# Patient Record
Sex: FEMALE | Race: BLACK OR AFRICAN AMERICAN | NOT HISPANIC OR LATINO | Employment: OTHER | URBAN - METROPOLITAN AREA
[De-identification: names, ages, dates, MRNs, and addresses within clinical notes are randomized per-mention and may not be internally consistent; named-entity substitution may affect disease eponyms.]

---

## 2017-03-20 ENCOUNTER — HOSPITAL ENCOUNTER (OUTPATIENT)
Dept: PULMONOLOGY | Facility: CLINIC | Age: 67
Discharge: HOME | End: 2017-03-20
Payer: MEDICARE

## 2017-03-20 ENCOUNTER — OFFICE VISIT (OUTPATIENT)
Dept: TRANSPLANT | Facility: CLINIC | Age: 67
End: 2017-03-20
Payer: MEDICARE

## 2017-03-20 VITALS
WEIGHT: 202.38 LBS | OXYGEN SATURATION: 97 % | SYSTOLIC BLOOD PRESSURE: 116 MMHG | DIASTOLIC BLOOD PRESSURE: 68 MMHG | HEART RATE: 81 BPM | HEIGHT: 66 IN | BODY MASS INDEX: 32.53 KG/M2

## 2017-03-20 VITALS — HEIGHT: 66 IN | BODY MASS INDEX: 32.14 KG/M2 | WEIGHT: 200 LBS

## 2017-03-20 DIAGNOSIS — E66.9 OBESITY, UNSPECIFIED OBESITY SEVERITY, UNSPECIFIED OBESITY TYPE: ICD-10-CM

## 2017-03-20 DIAGNOSIS — I27.20 PULMONARY HTN: ICD-10-CM

## 2017-03-20 DIAGNOSIS — I50.810 RVF (RIGHT VENTRICULAR FAILURE): ICD-10-CM

## 2017-03-20 PROCEDURE — 3074F SYST BP LT 130 MM HG: CPT | Mod: S$GLB,,, | Performed by: INTERNAL MEDICINE

## 2017-03-20 PROCEDURE — 99214 OFFICE O/P EST MOD 30 MIN: CPT | Mod: S$GLB,,, | Performed by: INTERNAL MEDICINE

## 2017-03-20 PROCEDURE — 99999 PR PBB SHADOW E&M-EST. PATIENT-LVL III: CPT | Mod: PBBFAC,,, | Performed by: INTERNAL MEDICINE

## 2017-03-20 PROCEDURE — 3078F DIAST BP <80 MM HG: CPT | Mod: S$GLB,,, | Performed by: INTERNAL MEDICINE

## 2017-03-20 PROCEDURE — 94620 PR PULMONARY STRESS TESTING,SIMPLE: CPT | Mod: S$GLB,,, | Performed by: INTERNAL MEDICINE

## 2017-03-20 PROCEDURE — 1160F RVW MEDS BY RX/DR IN RCRD: CPT | Mod: S$GLB,,, | Performed by: INTERNAL MEDICINE

## 2017-03-20 NOTE — PROCEDURES
Bessie Davis is a 66 y.o.  female patient, who presents for a 6 minute walk test ordered by mD. Isis.  The diagnosis is Pulmonary Hypertension.  The patient's BMI is 32.3 kg/m2.  Predicted distance (lower limit of normal) is 297.5 meters.      Test Results:    The test was completed with stops.  The patient stopped 1 times for a total of 17 seconds.  The total time walked was 343 seconds.  During walking, the patient reported:  Dyspnea, Leg pain, Other (Comment) (Back Pain). The patient used no assistive devices during testing.     03/20/2017---------Distance: 335.28 meters (1100 feet)     O2 Sat % Supplemental Oxygen Heart Rate Blood Pressure Matilda Scale   Pre-exercise  (Resting) 97 % Room Air 77 bpm 119/65 mmHg 0   During Exercise 88 % Room Air 95 bpm 141/82 mmHg 3   Post-exercise  (Recovery) 96 % Room Air  87 bpm 135/72 mmHg       Recovery Time: 160 seconds    Performing nurse/tech: ANGELINA Putnam.      PREVIOUS STUDY:   The patient had a previous study.  11/28/2016---------Distance: 321.87 meters (1056 feet)       O2 Sat % Supplemental Oxygen Heart Rate Blood Pressure Matilda Scale   Pre-exercise  (Resting) 95 % Room Air 80 bpm 121/59 mmHg 1   During Exercise 84 % Room Air 104 bpm 145/71 mmHg 4   Post-exercise  (Recovery) 93 % Room Air  90 bpm   mmHg          CLINICAL INTERPRETATION:  Six minute walk distance is 335.28 meters (1100 feet) with moderate dyspnea.  During exercise, there was significant desaturation while breathing room air.  Both blood pressure and heart rate remained stable with walking.  The patient reported non-pulmonary symptoms during exercise.  The patient did complete the study, walking 343 seconds of the 360 second test.  The patient may benefit from using supplemental oxygen during exertion.  Since the previous study in November 2016, exercise capacity is unchanged.  Based upon age and body mass index, exercise capacity is normal.

## 2017-03-20 NOTE — MR AVS SNAPSHOT
[facility]  [address]  [place] [address]  Phone: [phone]                  Bessie Davis   3/20/2017 9:30 AM   Office Visit    Description:  Female : 1950   Provider:  Chula Marinelli MD   Department:  [facility]           Reason for Visit     Pulmonary Hypertension           Diagnoses this Visit        Comments    Pulmonary HTN         RVF (right ventricular failure)         Obesity, unspecified obesity severity, unspecified obesity type                To Do List           Goals (5 Years of Data)     None      Follow-Up and Disposition     Return in about 3 months (around 2017).      [facility]     [facility] -  Assistance  Registered nurses in the [facility] provide clinical advisement, health education, appointment booking, and other advisory services.  Call for this free service at [phone].             Medications           Message regarding Medications     Verify the changes and/or additions to your medication regime listed below are the same as discussed with your clinician today.  If any of these changes or additions are incorrect, please notify your healthcare provider.             Verify that the below list of medications is an accurate representation of the medications you are currently taking.  If none reported, the list may be blank. If incorrect, please contact your healthcare provider. Carry this list with you in case of emergency.           Current Medications     ammonium lactate 12 % Crea Apply to feet daily after showering    aspirin (ECOTRIN) 81 MG EC tablet Take 1 tablet (81 mg total) by mouth once daily.    atorvastatin (LIPITOR) 80 MG tablet Take 1 tablet (80 mg total) by mouth every evening. DO NOT TAKE UNTIL FOLLOWING UP WITH MD.    bumetanide (BUMEX) 1 MG tablet Take 2 tablets (2 mg total) by mouth 2 (two) times daily. DO NOT TAKE UNTIL FOLLOWING UP WITH MD.    PARKER AC  " mg/5 mL syrup TAKE 5-10ML BY MOUTH EVERY 6 HOURS AS NEEDED FOR COUGH    colchicine 0.6 mg tablet 0.6 mg once daily.     COMBIVENT RESPIMAT  mcg/actuation inhaler     ferrous gluconate (FERGON) 324 MG tablet Take 1 tablet (324 mg total) by mouth 3 (three) times daily with meals.    guaifenesin (MUCINEX) 600 mg 12 hr tablet Take 1,200 mg by mouth 2 (two) times daily as needed for Congestion.    hydrocodone-acetaminophen 5-300 mg Tab Take 1 tablet by mouth 2 (two) times daily as needed (for pain).     irbesartan (AVAPRO) 150 MG tablet Take 150 mg by mouth once daily.    magnesium oxide (MAG-OX) 400 mg tablet Take 1 tablet (400 mg total) by mouth once daily.    potassium chloride SA (K-DUR,KLOR-CON) 20 MEQ tablet Take 1 tablet (20 mEq total) by mouth once daily. DO NOT RESTART UNTIL YOU RESTART YOUR BUMEX.    ranitidine (ZANTAC) 150 MG tablet Take 150 mg by mouth once daily.    treprostinil (TYVASO STARTER KIT) 1.74 mg/2.9 mL Nebu Begin with inhaling 3 breaths, 4 times daily. Increase inhalations by 3, 4 times daily weekly, until goal of 9 inhalations, 4 times daily is reached.    treprostinil-neb accessories (TYVASO REFILL KIT) 1.74 mg/2.9 mL (0.6 mg/mL) Nebu Begin with inhaling 3 breaths, 4 times daily. Increase inhalations by 3, 4 times daily weekly, until goal of 9 inhalations, 4 times daily is reached.    albuterol 90 mcg/actuation inhaler Inhale 2 puffs into the lungs every 6 (six) hours as needed for Wheezing.    budesonide-formoterol 160-4.5 mcg (SYMBICORT) 160-4.5 mcg/actuation HFAA Inhale 2 puffs into the lungs every 12 (twelve) hours.    cetirizine (ZYRTEC) 5 MG tablet Take 1 tablet (5 mg total) by mouth once daily.           Clinical Reference Information           Your Vitals Were     BP Pulse Height Weight SpO2 BMI    116/68 81 5' 6" (1.676 m) 91.8 kg (202 lb 6.1 oz) 97% 32.67 kg/m2      Blood Pressure          Most Recent Value    BP  116/68      Allergies as of 3/20/2017     Penicillins    " Sulfa (Sulfonamide Antibiotics)      Immunizations Administered on Date of Encounter - 3/20/2017     None      Orders Placed During Today's Visit     Future Labs/Procedures Expected by Expires    Comprehensive metabolic panel  As directed 3/20/2018    Recurring Lab Work Interval Expires    Brain natriuretic peptide  Every 4 Weeks until 3/20/2018 3/20/2018    Stress test, pulmonary  Every 12 Weeks until 2/18/2019 2/18/2019      [facility] Sign-Up     Activating your [facility] account is as easy as 1-2-3!     1) Visit [email or web address], select Sign Up Now, enter this activation code and your date of birth, then select Next.  [email or web address]  Expires: 5/4/2017 10:17 AM      2) Create a username and password to use when you visit [facility] in the future and select a security question in case you lose your password and select Next.    3) Enter your e-mail address and click Sign Up!    Additional Information  If you have questions, please e-mail [email or web address] or call [phone] to talk to our [facility] staff. Remember, [facility] is NOT to be used for urgent needs. For medical emergencies, dial 911.         Language Assistance Services     ATTENTION: Language assistance services are available, free of charge. Please call [phone].      ATENCIÓN: Si habla español, tiene a gallegos disposición servicios gratuitos de asistencia lingüística. Llame al [phone].     CHÚ Ý: N?u b?n nói Ti?ng Vi?t, có các d?ch v? h? tr? ngôn ng? mi?n phí dành cho b?n. G?i s? [phone].         [facility] complies with applicable Federal civil rights laws and does not discriminate on the basis of race, color, national origin, age, disability, or sex.

## 2017-03-20 NOTE — PROGRESS NOTES
"Subjective:     HPI:  65 y.o. AAF with history of obstructive lung disease, severe pulmonary hypertension (group 1), moderate to severe RV dysfunction, chronic hypercapnic respiratory failure, , obesity, active tobacco dependence, GLENN on CPAP, and diabetes who comes today for a regular F/U. She recently underwent a RHC that showed severe PH with normal PCWP. After which she was started on Tyvaso. So far tolerating her regimen. Since then she has been doing well. Had no HF admissions. Reporting WHO class II symptoms. No PND or orthopnea. She comes today with her grand children. Labs reviewed today and all WNL.      RHC done 9/2016  AOPRES: 131/84 (100)  AOSAT: 97  FICKCI: 2.46  FICKCO: 4.99  PAPRES: 109/29 (61)  PASAT: 69  PVR: 10.01  PWPRES: 13/14 (14)  RAPRES: 12/11 (10)  RVPRES: 94/6, 14    Past Medical History:   Diagnosis Date    Asthma     Diastolic dysfunction with heart failure 5/20/2015    Occl RCA, high grade prox LAD and LCfx cath [facility] Dec 2014 5/11/2015    Pulmonary HTN     Sleep apnea      Past Surgical History:   Procedure Laterality Date    BACK SURGERY      HYSTERECTOMY       OB History     No data available        Review of Systems   Constitution: Negative. Negative for chills, decreased appetite, diaphoresis, fever, weakness, malaise/fatigue, night sweats, weight gain and weight loss.   Eyes: Negative.    Cardiovascular: Positive for dyspnea on exertion. Negative for chest pain, claudication, cyanosis, irregular heartbeat, leg swelling, near-syncope, orthopnea, palpitations, paroxysmal nocturnal dyspnea and syncope.   Respiratory: Negative for cough, hemoptysis and shortness of breath.    Endocrine: Negative.    Hematologic/Lymphatic: Negative.    Skin: Negative for color change, dry skin and nail changes.   Musculoskeletal: Negative.    Gastrointestinal: Negative.    Genitourinary: Negative.        Objective:   Blood pressure 116/68, pulse 81, height 5' 6" (1.676 m), weight 91.8 kg (202 lb " 6.1 oz), SpO2 97 %.body mass index is 32.67 kg/(m^2).  Physical Exam   Constitutional: She is oriented to person, place, and time. Vital signs are normal. She appears well-developed and well-nourished.   HENT:   Head: Normocephalic.   Eyes: Pupils are equal, round, and reactive to light.   Neck: Neck supple. No JVD present.   Cardiovascular: Normal rate, regular rhythm, normal heart sounds and intact distal pulses.  PMI is displaced.  Exam reveals no gallop and no friction rub.    No murmur heard.  Pulmonary/Chest: Effort normal and breath sounds normal. No respiratory distress. She has no wheezes. She has no rales.   Abdominal: Soft. Bowel sounds are normal. She exhibits no distension. There is no tenderness. There is no rebound.   Musculoskeletal: She exhibits no edema.   Neurological: She is alert and oriented to person, place, and time.   Nursing note and vitals reviewed.      Labs:    Chemistry        Component Value Date/Time     03/20/2017 0855    K 4.6 03/20/2017 0855     03/20/2017 0855    CO2 27 03/20/2017 0855    BUN 16 03/20/2017 0855    CREATININE 1.0 03/20/2017 0855     03/20/2017 0855        Component Value Date/Time    CALCIUM 9.6 03/20/2017 0855    ALKPHOS 119 03/20/2017 0855    AST 17 03/20/2017 0855    ALT 16 03/20/2017 0855    BILITOT 0.7 03/20/2017 0855          Magnesium   Date Value Ref Range Status   11/28/2016 2.3 1.6 - 2.6 mg/dL Final     Lab Results   Component Value Date    WBC 4.38 11/28/2016    HGB 15.0 11/28/2016    HCT 48.0 11/28/2016     11/28/2016     Lab Results   Component Value Date    INR 1.0 09/07/2016    INR 1.1 05/11/2015    INR 1.1 05/10/2015     BNP   Date Value Ref Range Status   03/20/2017 <10 0 - 99 pg/mL Final     Comment:     Values of less than 100 pg/ml are consistent with non-CHF populations.   11/28/2016 37 0 - 99 pg/mL Final     Comment:     Values of less than 100 pg/ml are consistent with non-CHF populations.   08/17/2016 <10 0 - 99  pg/mL Final     Comment:     Values of less than 100 pg/ml are consistent with non-CHF populations.     LD   Date Value Ref Range Status   05/04/2015 380 (H) 110 - 260 U/L Final     Comment:     Results are increased in hemolyzed samples.   05/01/2015 301 (H) 110 - 260 U/L Final     Comment:     Results are increased in hemolyzed samples.     No results found for this or any previous visit.  No results found for this or any previous visit.    Assessment:      1. Pulmonary HTN    2. RVF (right ventricular failure)    3. Obesity, unspecified obesity severity, unspecified obesity type        Plan:   Clinically reports doing well. Euvolemic. WHO class II symptoms since on Tyvaso. Continue current PH regimen for now  Recommend 2 gram sodium restriction and 1500cc fluid restriction.  Encourage physical activity with graded exercise program.  Requested patient to weigh themselves daily, and to notify us if their weight increases by more than 3 lbs in 1 day or 5 lbs in 1 week.    RTC in 3 months with labs and 6 MWT      Chula Marinelli MD

## 2017-07-20 ENCOUNTER — LAB VISIT (OUTPATIENT)
Dept: LAB | Facility: HOSPITAL | Age: 67
End: 2017-07-20
Attending: INTERNAL MEDICINE
Payer: MEDICARE

## 2017-07-20 ENCOUNTER — OFFICE VISIT (OUTPATIENT)
Dept: TRANSPLANT | Facility: CLINIC | Age: 67
End: 2017-07-20
Payer: MEDICARE

## 2017-07-20 VITALS
WEIGHT: 210.75 LBS | DIASTOLIC BLOOD PRESSURE: 69 MMHG | SYSTOLIC BLOOD PRESSURE: 122 MMHG | HEART RATE: 79 BPM | HEIGHT: 66 IN | BODY MASS INDEX: 33.87 KG/M2

## 2017-07-20 DIAGNOSIS — I27.20 PULMONARY HYPERTENSION: Primary | ICD-10-CM

## 2017-07-20 DIAGNOSIS — E66.9 OBESITY, UNSPECIFIED OBESITY SEVERITY, UNSPECIFIED OBESITY TYPE: ICD-10-CM

## 2017-07-20 DIAGNOSIS — I50.810 RVF (RIGHT VENTRICULAR FAILURE): ICD-10-CM

## 2017-07-20 DIAGNOSIS — I50.32 CHRONIC DIASTOLIC CONGESTIVE HEART FAILURE: ICD-10-CM

## 2017-07-20 DIAGNOSIS — I27.9 CHRONIC PULMONARY HEART DISEASE: ICD-10-CM

## 2017-07-20 DIAGNOSIS — I27.20 PULMONARY HTN: ICD-10-CM

## 2017-07-20 DIAGNOSIS — I50.32 CHRONIC DIASTOLIC HEART FAILURE: ICD-10-CM

## 2017-07-20 DIAGNOSIS — I25.10 CORONARY ARTERY DISEASE INVOLVING NATIVE CORONARY ARTERY OF NATIVE HEART WITHOUT ANGINA PECTORIS: ICD-10-CM

## 2017-07-20 LAB
ALBUMIN SERPL BCP-MCNC: 3.5 G/DL
ALP SERPL-CCNC: 116 U/L
ALT SERPL W/O P-5'-P-CCNC: 23 U/L
ANION GAP SERPL CALC-SCNC: 8 MMOL/L
AST SERPL-CCNC: 19 U/L
BILIRUB SERPL-MCNC: 0.5 MG/DL
BNP SERPL-MCNC: 17 PG/ML
BNP SERPL-MCNC: 17 PG/ML
BUN SERPL-MCNC: 22 MG/DL
CALCIUM SERPL-MCNC: 9.3 MG/DL
CHLORIDE SERPL-SCNC: 105 MMOL/L
CO2 SERPL-SCNC: 28 MMOL/L
CREAT SERPL-MCNC: 1 MG/DL
EST. GFR  (AFRICAN AMERICAN): >60 ML/MIN/1.73 M^2
EST. GFR  (NON AFRICAN AMERICAN): 58.8 ML/MIN/1.73 M^2
GLUCOSE SERPL-MCNC: 101 MG/DL
POTASSIUM SERPL-SCNC: 4 MMOL/L
PROT SERPL-MCNC: 7.3 G/DL
SODIUM SERPL-SCNC: 141 MMOL/L

## 2017-07-20 PROCEDURE — 99999 PR PBB SHADOW E&M-EST. PATIENT-LVL V: CPT | Mod: PBBFAC,,, | Performed by: INTERNAL MEDICINE

## 2017-07-20 PROCEDURE — 1157F ADVNC CARE PLAN IN RCRD: CPT | Mod: S$GLB,,, | Performed by: INTERNAL MEDICINE

## 2017-07-20 PROCEDURE — 1126F AMNT PAIN NOTED NONE PRSNT: CPT | Mod: S$GLB,,, | Performed by: INTERNAL MEDICINE

## 2017-07-20 PROCEDURE — 99215 OFFICE O/P EST HI 40 MIN: CPT | Mod: S$GLB,,, | Performed by: INTERNAL MEDICINE

## 2017-07-20 PROCEDURE — 1159F MED LIST DOCD IN RCRD: CPT | Mod: S$GLB,,, | Performed by: INTERNAL MEDICINE

## 2017-07-20 RX ORDER — ALLOPURINOL 100 MG/1
100 TABLET ORAL DAILY
COMMUNITY
Start: 2017-07-06

## 2017-07-20 RX ORDER — FLUTICASONE PROPIONATE 50 MCG
SPRAY, SUSPENSION (ML) NASAL
COMMUNITY
Start: 2017-05-04

## 2017-07-20 RX ORDER — COLCHICINE 0.6 MG/1
0.6 TABLET ORAL
COMMUNITY

## 2017-07-20 RX ORDER — MUPIROCIN 20 MG/G
OINTMENT TOPICAL
COMMUNITY
Start: 2017-05-23 | End: 2019-12-18

## 2017-07-20 RX ORDER — TRIAMCINOLONE ACETONIDE 1 MG/G
CREAM TOPICAL
COMMUNITY
Start: 2017-05-23 | End: 2021-01-01

## 2017-07-20 NOTE — PROGRESS NOTES
Subjective:     HPI:    65 y.o. AAF with history of obstructive lung disease, severe pulmonary hypertension (group 1), moderate to severe RV dysfunction, chronic hypercapnic respiratory failure, , obesity, active tobacco dependence, GLENN on CPAP, and diabetes who comes today for a regular F/U. She  underwent a RHC that showed severe PH with normal PCWP. After which she was started on Tyvaso. So far tolerating her regimen. Since then she has been doing well. Had no HF admissions. Reporting WHO class II symptoms. No PND or orthopnea.        RHC done 9/2016  AOPRES: 131/84 (100)  AOSAT: 97  FICKCI: 2.46  FICKCO: 4.99  PAPRES: 109/29 (61)  PASAT: 69  PVR: 10.01  PWPRES: 13/14 (14)  RAPRES: 12/11 (10)  RVPRES: 94/6, 14        Past Medical History:   Diagnosis Date    Asthma     Diastolic dysfunction with heart failure 5/20/2015    Occl RCA, high grade prox LAD and LCfx cath [facility] Dec 2014 5/11/2015    Pulmonary HTN     Sleep apnea      Past Surgical History:   Procedure Laterality Date    BACK SURGERY      HYSTERECTOMY       OB History     No data available        Review of Systems   Constitution: Negative. Negative for chills, decreased appetite, diaphoresis, fever, weakness, malaise/fatigue, night sweats, weight gain and weight loss.   Eyes: Negative.    Cardiovascular: Positive for dyspnea on exertion. Negative for chest pain, claudication, cyanosis, irregular heartbeat, leg swelling, near-syncope, orthopnea, palpitations, paroxysmal nocturnal dyspnea and syncope.   Respiratory: Negative for cough, hemoptysis and shortness of breath.    Endocrine: Negative.    Hematologic/Lymphatic: Negative.    Skin: Negative for color change, dry skin and nail changes.   Musculoskeletal: Negative.    Gastrointestinal: Negative.    Genitourinary: Negative.        Objective:   There were no vitals taken for this visit.body mass index is unknown because there is no height or weight on file.  Physical Exam   Constitutional: She is  oriented to person, place, and time. Vital signs are normal. She appears well-developed and well-nourished.   HENT:   Head: Normocephalic.   Eyes: Pupils are equal, round, and reactive to light.   Neck: Neck supple. No JVD present.   Cardiovascular: Normal rate, regular rhythm, normal heart sounds and intact distal pulses.  PMI is displaced.  Exam reveals no gallop and no friction rub.    No murmur heard.  Pulmonary/Chest: Effort normal and breath sounds normal. No respiratory distress. She has no wheezes. She has no rales.   Abdominal: Soft. Bowel sounds are normal. She exhibits no distension. There is no tenderness. There is no rebound.   Musculoskeletal: She exhibits no edema.   Neurological: She is alert and oriented to person, place, and time.   Nursing note and vitals reviewed.      Labs:    Chemistry        Component Value Date/Time     03/20/2017 0855    K 4.6 03/20/2017 0855     03/20/2017 0855    CO2 27 03/20/2017 0855    BUN 16 03/20/2017 0855    CREATININE 1.0 03/20/2017 0855     03/20/2017 0855        Component Value Date/Time    CALCIUM 9.6 03/20/2017 0855    ALKPHOS 119 03/20/2017 0855    AST 17 03/20/2017 0855    ALT 16 03/20/2017 0855    BILITOT 0.7 03/20/2017 0855          Magnesium   Date Value Ref Range Status   11/28/2016 2.3 1.6 - 2.6 mg/dL Final     Lab Results   Component Value Date    WBC 4.38 11/28/2016    HGB 15.0 11/28/2016    HCT 48.0 11/28/2016     11/28/2016     Lab Results   Component Value Date    INR 1.0 09/07/2016    INR 1.1 05/11/2015    INR 1.1 05/10/2015     BNP   Date Value Ref Range Status   03/20/2017 <10 0 - 99 pg/mL Final     Comment:     Values of less than 100 pg/ml are consistent with non-CHF populations.   11/28/2016 37 0 - 99 pg/mL Final     Comment:     Values of less than 100 pg/ml are consistent with non-CHF populations.   08/17/2016 <10 0 - 99 pg/mL Final     Comment:     Values of less than 100 pg/ml are consistent with non-CHF  populations.     LD   Date Value Ref Range Status   05/04/2015 380 (H) 110 - 260 U/L Final     Comment:     Results are increased in hemolyzed samples.   05/01/2015 301 (H) 110 - 260 U/L Final     Comment:     Results are increased in hemolyzed samples.     No results found for this or any previous visit.  No results found for this or any previous visit.    Assessment:      1. Pulmonary hypertension    2. Chronic pulmonary heart disease    3. RVF (right ventricular failure)    4. Coronary artery disease involving native coronary artery of native heart without angina pectoris    5. Chronic diastolic heart failure    6. Obesity, unspecified obesity severity, unspecified obesity type        Plan:   Doing well  RTC 6 months

## 2017-11-28 DIAGNOSIS — I27.9 CHRONIC PULMONARY HEART DISEASE: Primary | ICD-10-CM

## 2018-02-09 ENCOUNTER — HOSPITAL ENCOUNTER (OUTPATIENT)
Dept: PULMONOLOGY | Facility: CLINIC | Age: 68
Discharge: HOME | End: 2018-02-09
Payer: MEDICARE

## 2018-02-09 ENCOUNTER — HOSPITAL ENCOUNTER (OUTPATIENT)
Dept: CARDIOLOGY | Facility: CLINIC | Age: 68
Discharge: HOME | End: 2018-02-09
Attending: INTERNAL MEDICINE
Payer: MEDICARE

## 2018-02-09 ENCOUNTER — TELEPHONE (OUTPATIENT)
Dept: TRANSPLANT | Facility: CLINIC | Age: 68
End: 2018-02-09

## 2018-02-09 ENCOUNTER — OFFICE VISIT (OUTPATIENT)
Dept: TRANSPLANT | Facility: CLINIC | Age: 68
End: 2018-02-09
Payer: MEDICARE

## 2018-02-09 VITALS
WEIGHT: 220 LBS | OXYGEN SATURATION: 96 % | HEART RATE: 81 BPM | SYSTOLIC BLOOD PRESSURE: 102 MMHG | HEIGHT: 66 IN | HEIGHT: 66 IN | BODY MASS INDEX: 35.36 KG/M2 | DIASTOLIC BLOOD PRESSURE: 65 MMHG | WEIGHT: 219.38 LBS | BODY MASS INDEX: 35.26 KG/M2

## 2018-02-09 DIAGNOSIS — I27.20 PULMONARY HYPERTENSION: Primary | ICD-10-CM

## 2018-02-09 DIAGNOSIS — I27.9 CHRONIC PULMONARY HEART DISEASE: ICD-10-CM

## 2018-02-09 DIAGNOSIS — I27.20 PULMONARY HTN: ICD-10-CM

## 2018-02-09 DIAGNOSIS — I50.32 CHRONIC DIASTOLIC HEART FAILURE: ICD-10-CM

## 2018-02-09 DIAGNOSIS — J96.11 CHRONIC RESPIRATORY FAILURE WITH HYPOXIA: ICD-10-CM

## 2018-02-09 DIAGNOSIS — E66.01 CLASS 2 SEVERE OBESITY DUE TO EXCESS CALORIES WITH SERIOUS COMORBIDITY AND BODY MASS INDEX (BMI) OF 35.0 TO 35.9 IN ADULT: ICD-10-CM

## 2018-02-09 DIAGNOSIS — I50.810 RVF (RIGHT VENTRICULAR FAILURE): ICD-10-CM

## 2018-02-09 DIAGNOSIS — I25.10 CORONARY ARTERY DISEASE INVOLVING NATIVE CORONARY ARTERY OF NATIVE HEART WITHOUT ANGINA PECTORIS: ICD-10-CM

## 2018-02-09 DIAGNOSIS — J43.8 OTHER EMPHYSEMA: ICD-10-CM

## 2018-02-09 DIAGNOSIS — N17.9 AKI (ACUTE KIDNEY INJURY): ICD-10-CM

## 2018-02-09 LAB
DIASTOLIC DYSFUNCTION: YES
ESTIMATED PA SYSTOLIC PRESSURE: 52.84
GLOBAL PERICARDIAL EFFUSION: ABNORMAL
RETIRED EF AND QEF - SEE NOTES: 60 (ref 55–65)
TRICUSPID VALVE REGURGITATION: ABNORMAL

## 2018-02-09 PROCEDURE — 99999 PR PBB SHADOW E&M-EST. PATIENT-LVL V: CPT | Mod: PBBFAC,,, | Performed by: INTERNAL MEDICINE

## 2018-02-09 PROCEDURE — 99214 OFFICE O/P EST MOD 30 MIN: CPT | Mod: S$GLB,,, | Performed by: INTERNAL MEDICINE

## 2018-02-09 PROCEDURE — 93306 TTE W/DOPPLER COMPLETE: CPT | Mod: S$GLB,,, | Performed by: INTERNAL MEDICINE

## 2018-02-09 PROCEDURE — 1159F MED LIST DOCD IN RCRD: CPT | Mod: S$GLB,,, | Performed by: INTERNAL MEDICINE

## 2018-02-09 PROCEDURE — 3008F BODY MASS INDEX DOCD: CPT | Mod: S$GLB,,, | Performed by: INTERNAL MEDICINE

## 2018-02-09 PROCEDURE — 94618 PULMONARY STRESS TESTING: CPT | Mod: S$GLB,,, | Performed by: INTERNAL MEDICINE

## 2018-02-09 RX ORDER — METFORMIN HYDROCHLORIDE 500 MG/1
500 TABLET ORAL 2 TIMES DAILY
COMMUNITY
Start: 2018-02-03

## 2018-02-09 NOTE — PROGRESS NOTES
Subjective:     HPI:    67 y.o. AAF with mild obstructive lung disease, severe pulmonary hypertension (WHO group 1), moderate to severe RV dysfunction, chronic hypercapnic respiratory failure, , obesity, active tobacco dependence, GLENN on CPAP, and diabetes who comes today for a regular F/U. She  underwent a RHC that showed severe PH with normal PCWP. After which she was started on Tyvaso.       Since last visit, pt says she's been feeling ok. Remains on tyvaso 9 breaths 4 times a day- only misses one if she goes to the doctor early.  Says her breathing has been pretty good but she does suffer with allergies (has to keep the AC on even if its cold out and says she has been like this all her life because she was born with asthma)  No PND or orthopnea. Sometimes retains fluid over the course of the day depending on what she drinks- today has only had a mouthful of water. No CP, says one day while doing PT for her shoulder she got LH (had not eatten and had taken her fluid pill, came home and felt drunk- hydrated and felt better)    Goes grocery shopping for herself- says she can go for hours and doesn't get OOB, but does get SOB doing housework (sweeping)    + constipation, no SE from the tyvaso    6mw today  282m ( 335 m in March 2017   )                                              O2 sat  93 -> 86 %  ->     95%    On 2L                                                HR  80 -> 104                                                                  BP   108/72   -> 133/ 80                                                        Matilda  3   -> 3    TTE today  The right ventricle is upper limit of normal measuring 4.2 cm at the base in the apical right ventricle-focused view. Global right ventricular systolic function appears low normal. Tricuspid annular plane systolic excursion (TAPSE) is   2.1 cm. Tissue Doppler-derived tricuspid annular peak systolic velocity (S prime) is 14.0 cm/s. The estimated PA systolic pressure is  "53 mmHg    1 - Normal left ventricular systolic function (EF 60-65%).     2 - Wall motion abnormalities.     3 - Biatrial enlargement.     4 - Impaired LV relaxation, elevated LAP (grade 2 diastolic dysfunction).     5 - Right ventricle is upper limit of normal in size with low normal systolic function.     6 - Trivial tricuspid regurgitation.     7 - Trivial pericardial effusion.     8 - Pulmonary hypertension. The estimated PA systolic pressure is 53 mmHg.     RHC done 9/2016  AOPRES: 131/84 (100)  AOSAT: 97  FICKCI: 2.46  FICKCO: 4.99  PAPRES: 109/29 (61)  PASAT: 69  PVR: 10.01  PWPRES: 13/14 (14)  RAPRES: 12/11 (10)  RVPRES: 94/6, 14        Past Medical History:   Diagnosis Date    Asthma     Diastolic dysfunction with heart failure 5/20/2015    Occl RCA, high grade prox LAD and LCfx cath [facility] Dec 2014 5/11/2015    Pulmonary HTN     Sleep apnea      Past Surgical History:   Procedure Laterality Date    BACK SURGERY      HYSTERECTOMY       OB History     No data available        Review of Systems   Constitution: Negative. Negative for chills, decreased appetite, diaphoresis, fever, weakness, malaise/fatigue, night sweats, weight gain and weight loss.   Eyes: Negative.    Cardiovascular: Positive for dyspnea on exertion. Negative for chest pain, claudication, cyanosis, irregular heartbeat, leg swelling, near-syncope, orthopnea, palpitations, paroxysmal nocturnal dyspnea and syncope.   Respiratory: Negative for cough, hemoptysis and shortness of breath.    Endocrine: Negative.    Hematologic/Lymphatic: Negative.    Skin: Negative for color change, dry skin and nail changes.   Musculoskeletal: Positive for back pain.   Gastrointestinal: Positive for constipation.   Genitourinary: Negative.        Objective:   Blood pressure 102/65, pulse 81, height 5' 6" (1.676 m), weight 99.5 kg (219 lb 5.7 oz), SpO2 96 %.body mass index is 35.41 kg/m².  Physical Exam   Constitutional: She is oriented to person, place, and " time. Vital signs are normal. She appears well-developed and well-nourished.   HENT:   Head: Normocephalic.   Eyes: Pupils are equal, round, and reactive to light.   Neck: Neck supple. No JVD present.   Cardiovascular: Normal rate, regular rhythm, normal heart sounds and intact distal pulses.  PMI is displaced.  Exam reveals no gallop and no friction rub.    No murmur heard.  Accentuated S2   Pulmonary/Chest: Effort normal and breath sounds normal. No respiratory distress. She has no wheezes. She has no rales.   Abdominal: Soft. Bowel sounds are normal. She exhibits no distension. There is no tenderness. There is no rebound.   Musculoskeletal: She exhibits no edema.   Neurological: She is alert and oriented to person, place, and time.   Nursing note and vitals reviewed.      Labs:    Chemistry        Component Value Date/Time     07/20/2017 0927    K 4.0 07/20/2017 0927     07/20/2017 0927    CO2 28 07/20/2017 0927    BUN 22 07/20/2017 0927    CREATININE 1.0 07/20/2017 0927     07/20/2017 0927        Component Value Date/Time    CALCIUM 9.3 07/20/2017 0927    ALKPHOS 116 07/20/2017 0927    AST 19 07/20/2017 0927    ALT 23 07/20/2017 0927    BILITOT 0.5 07/20/2017 0927          Magnesium   Date Value Ref Range Status   11/28/2016 2.3 1.6 - 2.6 mg/dL Final     Lab Results   Component Value Date    WBC 4.38 11/28/2016    HGB 15.0 11/28/2016    HCT 48.0 11/28/2016     11/28/2016     Lab Results   Component Value Date    INR 1.0 09/07/2016    INR 1.1 05/11/2015    INR 1.1 05/10/2015     BNP   Date Value Ref Range Status   02/09/2018 12 0 - 99 pg/mL Final     Comment:     Values of less than 100 pg/ml are consistent with non-CHF populations.   07/20/2017 17 0 - 99 pg/mL Final     Comment:     Values of less than 100 pg/ml are consistent with non-CHF populations.   07/20/2017 17 0 - 99 pg/mL Final     Comment:     Values of less than 100 pg/ml are consistent with non-CHF populations.     LD   Date  Value Ref Range Status   05/04/2015 380 (H) 110 - 260 U/L Final     Comment:     Results are increased in hemolyzed samples.   05/01/2015 301 (H) 110 - 260 U/L Final     Comment:     Results are increased in hemolyzed samples.     No results found for this or any previous visit.  No results found for this or any previous visit.    Assessment:      1. Pulmonary hypertension- SEVERE on RHC with normal PCW, her COPD is moderate by PFTs, but on her imaging she has only mild centrilobular emphysema (much more prominent are   her enlarged PA, RV and RA her COPD is moderate by PFTs, but on her imaging she has only mild centrilobular emphysema (much more prominent are her enlarged PA, RV and RA)  Today, BNP is normal, echo is a little better, but 6mw distance is not near goal and she remains hypoxic on RA   2. RVF (right ventricular failure)    3. Chronic pulmonary heart disease    4. Chronic diastolic heart failure    5. Other emphysema    6. Coronary artery disease involving native coronary artery of native heart without angina pectoris    7. TREVER (acute kidney injury)    8. Chronic respiratory failure with hypoxia    9. Class 2 severe obesity due to excess calories with serious comorbidity and body mass index (BMI) of 35.0 to 35.9 in adult      WHO group 1, FC II    Plan:     Apply for adempas 1mg tid and refill tyvaso   Next visit will add ERA  Then repeat RHC  Needs to wear O2 on ANY exertion (already has it, just didn't bring it)  Keep salt intake to under 2000 mg sodium, fluids to under 2 L (64 oz)  Should call us for increased SOB, swelling or wt changes    F/u 3 mo with labs and walk

## 2018-02-09 NOTE — PROCEDURES
Bessie Davis is a 67 y.o.  female patient, who presents for a 6 minute walk test ordered by Chula Marinelli MD.  The diagnosis is Pulmonary Hypertension.  The patient's BMI is 35.6 kg/m2. Predicted distance (lower limit of normal) is 271.08 meters.    Test Results:    The test was completed with stops.  The patient stopped 1 time for a total of 50 seconds.  The total time walked was 310 seconds.  During walking, the patient reported:  Dyspnea, Leg pain.  The patient used supplemental oxygen during repeat testing.     02/09/2018---------Distance: 281.94 meters (925 feet)     O2 Sat % Supplemental Oxygen Heart Rate Blood Pressure Matilda Scale   Pre-exercise  (Resting) 93 % Room Air 80 bpm 108/72 mmHg 3   During Exercise 86 % Room Air 104 bpm 133/80 mmHg 3   Post-exercise   94 % Room Air  89 bpm       Recovery Time: 72 seconds    Oxygen Qualification:     O2 Sat % Supplemental Oxygen Heart Rate Blood Pressure Matilda Scale   Pre-exercise  (Resting) 98 % 2 L/M  81 bpm  122/67 mmHg 0    During Exercise 95 %  2 L/M  86 bpm  123/66 mmHg 0.5    Post-exercise   96 %  2 L/M  86 bpm        Performing nurse/tech:  DUANE Vital RRT      PREVIOUS STUDY:   03/20/2017---------Distance: 335.28 meters (1100 feet)       O2 Sat % Supplemental Oxygen Heart Rate Blood Pressure Matilda Scale   Pre-exercise  (Resting) 97 % Room Air 77 bpm 119/65 mmHg 0   During Exercise 88 % Room Air 95 bpm 141/82 mmHg 3   Post-exercise  (Recovery) 96 % Room Air  87 bpm 135/72 mmHg        CLINICAL INTERPRETATION:  Six minute walk distance is 281.94 meters (925 feet) with moderate dyspnea.  During exercise, there was significant desaturation while breathing room air.  Blood pressure remained stable and Heart rate increased significantly with walking.  The patient reported non-pulmonary symptoms during exercise.  Significant exercise impairment is likely due to desaturation and subjective symptoms.  The patient did complete the study, walking 310  seconds of the 360 second test.  The patient may benefit from using supplemental oxygen during exertion.  Since the previous study in March 2017, exercise capacity is significantly worse.  Based upon age and body mass index, exercise capacity may be normal.   Oxygen saturation did improve while breathing supplemental oxygen.

## 2018-02-09 NOTE — PATIENT INSTRUCTIONS
We are going to start you on adempas 1mg three times a day and refill tyvaso     Next visit will add another pill for the pulmonary hypertension    Then we will repeat a right heart cath to measure the pressure in the lungs directly     wear oxygen on ANY exertion    Keep salt intake to under 2000 mg sodium, fluids to under 2 L (64 oz)    Call us if you find yourself getting more short of breath, have more swelling or unexpected weight changes

## 2018-02-14 NOTE — TELEPHONE ENCOUNTER
All strengths of Adempas approved. Notification sent to Mariza at [employer] and [facility].

## 2018-02-14 NOTE — TELEPHONE ENCOUNTER
PA for all strengths of Adempas (1, 1.5, 2, 2.5mg) sent to pt's insurance, with accompanying clinical documentation.

## 2018-02-23 PROCEDURE — 99283 EMERGENCY DEPT VISIT LOW MDM: CPT | Mod: ,,, | Performed by: PHYSICIAN ASSISTANT

## 2018-02-23 PROCEDURE — 99283 EMERGENCY DEPT VISIT LOW MDM: CPT

## 2018-02-24 ENCOUNTER — HOSPITAL ENCOUNTER (EMERGENCY)
Facility: HOSPITAL | Age: 68
Discharge: HOME | End: 2018-02-24
Attending: EMERGENCY MEDICINE
Payer: MEDICARE

## 2018-02-24 VITALS
RESPIRATION RATE: 20 BRPM | TEMPERATURE: 98 F | BODY MASS INDEX: 35.36 KG/M2 | OXYGEN SATURATION: 94 % | HEIGHT: 66 IN | DIASTOLIC BLOOD PRESSURE: 68 MMHG | WEIGHT: 220 LBS | SYSTOLIC BLOOD PRESSURE: 119 MMHG | HEART RATE: 81 BPM

## 2018-02-24 DIAGNOSIS — M79.10 MUSCLE PAIN: ICD-10-CM

## 2018-02-24 DIAGNOSIS — R05.9 COUGH: Primary | ICD-10-CM

## 2018-02-24 RX ORDER — BENZONATATE 100 MG/1
100 CAPSULE ORAL 3 TIMES DAILY PRN
Qty: 20 CAPSULE | Refills: 0 | Status: SHIPPED | OUTPATIENT
Start: 2018-02-24 | End: 2018-03-06

## 2018-02-24 RX ORDER — AZITHROMYCIN 1 G/1
1 POWDER, FOR SUSPENSION ORAL ONCE
COMMUNITY
End: 2021-01-01

## 2018-02-24 RX ORDER — NAPROXEN 500 MG/1
500 TABLET ORAL 2 TIMES DAILY WITH MEALS
Qty: 14 TABLET | Refills: 0 | Status: SHIPPED | OUTPATIENT
Start: 2018-02-24 | End: 2018-03-03

## 2018-02-24 NOTE — ED TRIAGE NOTES
Pt reports productive cough with clear sputum x4 days. States her MD ordered a z-pack. Reports pain to upper lateral right side of torso only when she's coughing. Denies n/v, fever, chills. Endorses SOB with exertion but states she has asthma and COPD so this is baseline. Reports is following up with PCP concerning SOB.    Pt identifiers Bessie Davis checked and correct  LOC: The patient is awake, alert, aware of environment with an appropriate affect. Oriented x3, speaking appropriately  APPEARANCE: Pt resting comfortably, in no acute distress, pt is clean and well groomed, clothing properly fastened  SKIN: Skin warm, dry and intact, normal skin turgor, moist mucus membranes  RESPIRATORY: Airway is open and patent, respirations are spontaneous, even and unlabored, normal effort and rate  CARDIAC: Normal rate and rhythm, no peripheral edema noted, capillary refill < 3 seconds, bilateral radial pulses 2+

## 2018-02-24 NOTE — ED PROVIDER NOTES
Encounter Date: 2/23/2018       History     Chief Complaint   Patient presents with    Cough     pt reports cough all week that was more severe tonight - she reports soreness to right chest walll - denies cp and sob      Patient is a 67-year-old female with past medical history of CHF, asthma, pulmonary hypertension, and COPD who presents to the emergency department due to a 1 day history of right rib pain.  Patient states she has a sinus infection and was diagnosed with this on Monday.  Patient states that Dr. David called and a Z-Miguel as well as Mucinex and Zyrtec.  Patient states she's been taking her medication however has continued to have a cough with clear sputum.  Patient states that her cough was worsening today and she developed some right-sided rib pain worse with movement.  Patient states that she was concerned that this was due to her extensive coughing and that she possibly pulled a muscle.  Patient states she reported to the emergency department for an x-ray to see if she pulled a muscle.  Patient denies any nausea, vomiting, fevers, shortness of breath, or any other complaints.          Review of patient's allergies indicates:   Allergen Reactions    Penicillins     Sulfa (sulfonamide antibiotics)      Past Medical History:   Diagnosis Date    Asthma     COPD (chronic obstructive pulmonary disease)     Diastolic dysfunction with heart failure 5/20/2015    Occl RCA, high grade prox LAD and LCfx cath [facility] Dec 2014 5/11/2015    Pulmonary HTN     Sleep apnea      Past Surgical History:   Procedure Laterality Date    BACK SURGERY      HYSTERECTOMY       Family History   Problem Relation Age of Onset    Cancer Sister      Social History   Substance Use Topics    Smoking status: Former Smoker     Packs/day: 0.25     Years: 15.00     Types: Cigarettes     Quit date: 11/2016    Smokeless tobacco: Never Used    Alcohol use No     Review of Systems   Constitutional: Negative for activity change,  appetite change, diaphoresis, fatigue and fever.   HENT: Negative for congestion, dental problem, drooling, ear pain, facial swelling, sore throat and trouble swallowing.    Eyes: Negative for pain, discharge and visual disturbance.   Respiratory: Positive for cough. Negative for apnea, chest tightness and shortness of breath.    Cardiovascular: Negative for chest pain and palpitations.   Gastrointestinal: Negative for abdominal distention, anal bleeding, blood in stool, diarrhea, nausea and vomiting.   Endocrine: Negative for cold intolerance and polydipsia.   Genitourinary: Negative for decreased urine volume, difficulty urinating, enuresis, frequency and hematuria.   Musculoskeletal: Negative for arthralgias, gait problem, myalgias and neck stiffness.   Skin: Negative for color change and pallor.   Allergic/Immunologic: Negative for environmental allergies.   Neurological: Negative for dizziness, syncope, numbness and headaches.   Psychiatric/Behavioral: Negative for agitation, confusion and dysphoric mood.       Physical Exam     Initial Vitals [02/23/18 2135]   BP Pulse Resp Temp SpO2   123/66 86 18 98.3 °F (36.8 °C) (!) 92 %      MAP       85         Physical Exam    Nursing note and vitals reviewed.  Constitutional: She appears well-developed and well-nourished. She is not diaphoretic. No distress.   HENT:   Head: Normocephalic and atraumatic.   Neck: Normal range of motion. Neck supple.   Cardiovascular: Normal rate, regular rhythm and normal heart sounds. Exam reveals no gallop and no friction rub.    No murmur heard.  Pulmonary/Chest: Breath sounds normal. She has no wheezes. She has no rhonchi. She has no rales.   Abdominal: Soft. Bowel sounds are normal. There is no tenderness. There is no rebound and no guarding.   Musculoskeletal: Normal range of motion.   Neurological: She is alert and oriented to person, place, and time.   Skin: Skin is warm and dry. No rash noted. No erythema.   Psychiatric: She has  a normal mood and affect.         ED Course   Procedures  Labs Reviewed - No data to display                APC / Resident Notes:   Patient is a 67-year-old female with past medical history of CHF, asthma, pulmonary hypertension, and COPD who presents to the emergency department due to a 1 day history of right rib pain.  Physical exam reveals female no acute distress.  Heart regular rate and rhythm.  Lungs clear to auscultation bilaterally.  Abdomen soft nontender nondistended.  Tenderness to palpation to right side with no bruising noted.  Suspect symptoms are mostly skeletal in nature given patient's persistent coughing.  Will give patient a prescription for NSAID and Tessalon Perles.  Will discharge home.  Treatment discussed with attending physician and he is agreeable.                 Clinical Impression:   The primary encounter diagnosis was Cough. A diagnosis of Muscle pain was also pertinent to this visit.    Disposition:   Disposition: Discharged  Condition: Stable                        Caryl Arce PA-C  02/24/18 0117

## 2018-02-26 ENCOUNTER — TELEPHONE (OUTPATIENT)
Dept: TRANSPLANT | Facility: CLINIC | Age: 68
End: 2018-02-26

## 2018-03-08 ENCOUNTER — TELEPHONE (OUTPATIENT)
Dept: TRANSPLANT | Facility: CLINIC | Age: 68
End: 2018-03-08

## 2018-03-22 DIAGNOSIS — R06.82 TACHYPNEA: ICD-10-CM

## 2018-03-22 DIAGNOSIS — Z79.899 POLYPHARMACY: Primary | ICD-10-CM

## 2018-03-22 DIAGNOSIS — I27.9 CHRONIC PULMONARY HEART DISEASE: ICD-10-CM

## 2018-04-04 ENCOUNTER — TELEPHONE (OUTPATIENT)
Dept: TRANSPLANT | Facility: CLINIC | Age: 68
End: 2018-04-04

## 2018-05-14 ENCOUNTER — TELEPHONE (OUTPATIENT)
Dept: TRANSPLANT | Facility: CLINIC | Age: 68
End: 2018-05-14

## 2018-05-14 ENCOUNTER — OFFICE VISIT (OUTPATIENT)
Dept: TRANSPLANT | Facility: CLINIC | Age: 68
End: 2018-05-14
Payer: MEDICARE

## 2018-05-14 VITALS
DIASTOLIC BLOOD PRESSURE: 59 MMHG | HEART RATE: 83 BPM | WEIGHT: 217.38 LBS | HEIGHT: 66 IN | BODY MASS INDEX: 34.93 KG/M2 | OXYGEN SATURATION: 93 % | SYSTOLIC BLOOD PRESSURE: 100 MMHG

## 2018-05-14 DIAGNOSIS — J96.11 CHRONIC RESPIRATORY FAILURE WITH HYPOXIA: ICD-10-CM

## 2018-05-14 DIAGNOSIS — I25.10 CORONARY ARTERY DISEASE INVOLVING NATIVE CORONARY ARTERY OF NATIVE HEART WITHOUT ANGINA PECTORIS: ICD-10-CM

## 2018-05-14 DIAGNOSIS — I50.810 RVF (RIGHT VENTRICULAR FAILURE): ICD-10-CM

## 2018-05-14 DIAGNOSIS — J44.9 CHRONIC OBSTRUCTIVE PULMONARY DISEASE: ICD-10-CM

## 2018-05-14 DIAGNOSIS — I50.32 CHRONIC DIASTOLIC HEART FAILURE: ICD-10-CM

## 2018-05-14 DIAGNOSIS — E66.01 CLASS 2 SEVERE OBESITY DUE TO EXCESS CALORIES WITH SERIOUS COMORBIDITY AND BODY MASS INDEX (BMI) OF 35.0 TO 35.9 IN ADULT: ICD-10-CM

## 2018-05-14 DIAGNOSIS — I95.9 HYPOTENSION, UNSPECIFIED HYPOTENSION TYPE: ICD-10-CM

## 2018-05-14 DIAGNOSIS — R06.09 DOE (DYSPNEA ON EXERTION): ICD-10-CM

## 2018-05-14 DIAGNOSIS — I27.20 PULMONARY HYPERTENSION: Primary | ICD-10-CM

## 2018-05-14 PROCEDURE — 99214 OFFICE O/P EST MOD 30 MIN: CPT | Mod: S$GLB,,, | Performed by: INTERNAL MEDICINE

## 2018-05-14 PROCEDURE — 99999 PR PBB SHADOW E&M-EST. PATIENT-LVL III: CPT | Mod: PBBFAC,,, | Performed by: INTERNAL MEDICINE

## 2018-05-14 PROCEDURE — 3078F DIAST BP <80 MM HG: CPT | Mod: CPTII,S$GLB,, | Performed by: INTERNAL MEDICINE

## 2018-05-14 PROCEDURE — 3074F SYST BP LT 130 MM HG: CPT | Mod: CPTII,S$GLB,, | Performed by: INTERNAL MEDICINE

## 2018-05-14 NOTE — TELEPHONE ENCOUNTER
Asked per Dr Salmeron to speak w/pt regarding starting Opsumit. Pt is familiar with SP, and is currently w/[facility].     Pt provided w/written  booklet on opsumit, and rationalization for taking medication discussed (medication targets 3rd pathway, different from two other medications pt is on). Possible SEs were discussed, including, but not limited to abdominal discomfort, lightheadedness, low red blood cell count, generally feeling unwell. Pt was asked to contact this RN with any and all SEs that were concerning or bothersome to her. It was noted that we would check her CBC in about one month, as well. Finally, it was discussed that women of childbearing potential should not become pregnant while taking Opsumit. Pt is post-menopausal.     All questions/concerns answered to pt satisfaction. [facility].

## 2018-05-14 NOTE — PROGRESS NOTES
Subjective:     HPI:    67 y.o. AAF with mild obstructive lung disease, severe pulmonary hypertension (WHO group 1), moderate to severe RV dysfunction, chronic hypercapnic respiratory failure, , obesity, active tobacco dependence, GLENN on CPAP, and diabetes who comes today for a regular F/U. She  underwent a RHC that showed severe PH with normal PCWP. After which she was started on Tyvaso.       Since last visit, pt says her back has been hurting so she didn't do her walk today- plan to go back to get another epidural shot. At last visit we added adempas- she is on 1.5mg tid and she says he feels good- is about to increase to 2mg.  She sleeps with her bipap on but still feels sleepy during the day- had gotten a new machine towards the end of last year, she says her O2 tank beeps at night- worries she is not getting enough O2 at night. If she takes her time, says she can do housework all day though she tires out quickly sweeping and mopping, but says it IS better since starting the adempas. No swelling  In her legs, no pain in her chest except her right side, says it's sensitive when he presses it, on top the rib.  No LH.     Was in the ER with pain in her side when she coughs- was given antiinflammatory (NAPROXEN) and cough suppressant.      Remains on tyvaso 9 breaths 4 times a day-  No PND or orthopnea.      + constipation, no SE from the tyvaso or adempas    No 6mw today   Feb  282m ( 335 m in March 2017   )                                              O2 sat  93 -> 86 %  ->     95%    On 2L                                                HR  80 -> 104                                                                  BP   108/72   -> 133/ 80                                                        Matilda  3   -> 3    TTE 2/9/18  The right ventricle is upper limit of normal measuring 4.2 cm at the base in the apical right ventricle-focused view. Global right ventricular systolic function appears low normal. Tricuspid  annular plane systolic excursion (TAPSE) is   2.1 cm. Tissue Doppler-derived tricuspid annular peak systolic velocity (S prime) is 14.0 cm/s. The estimated PA systolic pressure is 53 mmHg    1 - Normal left ventricular systolic function (EF 60-65%).     2 - Wall motion abnormalities.     3 - Biatrial enlargement.     4 - Impaired LV relaxation, elevated LAP (grade 2 diastolic dysfunction).     5 - Right ventricle is upper limit of normal in size with low normal systolic function.     6 - Trivial tricuspid regurgitation.     7 - Trivial pericardial effusion.     8 - Pulmonary hypertension. The estimated PA systolic pressure is 53 mmHg.     RHC done 9/2016  AOPRES: 131/84 (100)  AOSAT: 97  FICKCI: 2.46  FICKCO: 4.99  PAPRES: 109/29 (61)  PASAT: 69  PVR: 10.01  PWPRES: 13/14 (14)  RAPRES: 12/11 (10)  RVPRES: 94/6, 14        Past Medical History:   Diagnosis Date    Asthma     COPD (chronic obstructive pulmonary disease)     Diastolic dysfunction with heart failure 5/20/2015    Occl RCA, high grade prox LAD and LCfx cath [facility] Dec 2014 5/11/2015    Pulmonary HTN     Sleep apnea      Past Surgical History:   Procedure Laterality Date    BACK SURGERY      HYSTERECTOMY       OB History     No data available        Review of Systems   Constitution: Negative. Negative for chills, decreased appetite, diaphoresis, fever, weakness, malaise/fatigue, night sweats, weight gain and weight loss.   Eyes: Negative.    Cardiovascular: Positive for dyspnea on exertion. Negative for chest pain, claudication, cyanosis, irregular heartbeat, leg swelling, near-syncope, orthopnea, palpitations, paroxysmal nocturnal dyspnea and syncope.   Respiratory: Negative for cough, hemoptysis and shortness of breath.    Endocrine: Negative.    Hematologic/Lymphatic: Negative.    Skin: Negative for color change, dry skin and nail changes.   Musculoskeletal: Positive for back pain.   Gastrointestinal: Positive for constipation.   Genitourinary:  "Negative.        Objective:   Blood pressure (!) 100/59, pulse 83, height 5' 6" (1.676 m), weight 98.6 kg (217 lb 6 oz), SpO2 (!) 93 %.body mass index is 35.09 kg/m².  Physical Exam   Constitutional: She is oriented to person, place, and time. Vital signs are normal. She appears well-developed and well-nourished.   HENT:   Head: Normocephalic.   Eyes: Pupils are equal, round, and reactive to light.   Neck: Neck supple. No JVD present.   Cardiovascular: Normal rate, regular rhythm, normal heart sounds and intact distal pulses.  PMI is displaced.  Exam reveals no gallop and no friction rub.    No murmur heard.  Accentuated S2   Pulmonary/Chest: Effort normal and breath sounds normal. No respiratory distress. She has no wheezes. She has no rales.   Abdominal: Soft. Bowel sounds are normal. She exhibits no distension. There is no tenderness. There is no rebound.   Musculoskeletal: She exhibits no edema.   Neurological: She is alert and oriented to person, place, and time.   Nursing note and vitals reviewed.      Labs:    Chemistry        Component Value Date/Time     05/14/2018 0926    K 3.9 05/14/2018 0926     05/14/2018 0926    CO2 26 05/14/2018 0926    BUN 19 05/14/2018 0926    CREATININE 1.1 05/14/2018 0926     05/14/2018 0926        Component Value Date/Time    CALCIUM 9.4 05/14/2018 0926    ALKPHOS 96 05/14/2018 0926    AST 15 05/14/2018 0926    ALT 14 05/14/2018 0926    BILITOT 0.6 05/14/2018 0926          Magnesium   Date Value Ref Range Status   05/14/2018 2.1 1.6 - 2.6 mg/dL Final     Lab Results   Component Value Date    WBC 5.34 05/14/2018    HGB 13.1 05/14/2018    HCT 43.0 05/14/2018     05/14/2018     Lab Results   Component Value Date    INR 1.0 09/07/2016    INR 1.1 05/11/2015    INR 1.1 05/10/2015     BNP   Date Value Ref Range Status   05/14/2018 11 0 - 99 pg/mL Final     Comment:     Values of less than 100 pg/ml are consistent with non-CHF populations.   02/09/2018 12 0 - 99 " pg/mL Final     Comment:     Values of less than 100 pg/ml are consistent with non-CHF populations.   07/20/2017 17 0 - 99 pg/mL Final     Comment:     Values of less than 100 pg/ml are consistent with non-CHF populations.   07/20/2017 17 0 - 99 pg/mL Final     Comment:     Values of less than 100 pg/ml are consistent with non-CHF populations.     LD   Date Value Ref Range Status   05/04/2015 380 (H) 110 - 260 U/L Final     Comment:     Results are increased in hemolyzed samples.   05/01/2015 301 (H) 110 - 260 U/L Final     Comment:     Results are increased in hemolyzed samples.     No results found for this or any previous visit.  No results found for this or any previous visit.    Assessment:      1. Pulmonary hypertension- SEVERE on RHC with normal PCW, her COPD is moderate by PFTs, but on her imaging she has only mild centrilobular emphysema (much more prominent are   her enlarged PA, RV and RA her COPD is moderate by PFTs, but on her imaging she has only mild centrilobular emphysema (much more prominent are her enlarged PA, RV and RA)  Today, BNP is normal, last echo a little better, euvolemic on exam   2. RVF (right ventricular failure)    3. Chronic pulmonary heart disease    4. Chronic diastolic heart failure    5. Other emphysema    6. Coronary artery disease involving native coronary artery of native heart without angina pectoris    7. TREVER (acute kidney injury)    8. Chronic respiratory failure with hypoxia    9. Class 2 severe obesity due to excess calories with serious comorbidity and body mass index (BMI) of 35.0 to 35.9 in adult      WHO group 1, FC II    Plan:   Continue increasing adempas, then add macitentan    STOP naproxen, heat and tylenol for the rib pain    Then repeat RHC in sept    Needs to wear O2 on ANY exertion (already has it, just didn't bring it again today because she says its heavy)    Keep salt intake to under 2000 mg sodium, fluids to under 2 L (64 oz)    Should call us for  increased SOB, swelling or wt changes

## 2018-05-16 ENCOUNTER — TELEPHONE (OUTPATIENT)
Dept: TRANSPLANT | Facility: CLINIC | Age: 68
End: 2018-05-16

## 2018-05-17 NOTE — TELEPHONE ENCOUNTER
Pt's Opsumit approved through 5/17/2020. Notification sent to Karie at [facility], and Kwasi at [facility].

## 2018-05-18 ENCOUNTER — RESEARCH ENCOUNTER (OUTPATIENT)
Dept: RESEARCH | Facility: HOSPITAL | Age: 68
End: 2018-05-18

## 2018-05-18 NOTE — PROGRESS NOTES
Pt identified as a candidate for the OPUS registry. Contacted pt via phone .Consent information mailed to pt. with plan to contact her next week to review the consent.Pt agreed to plan.

## 2018-05-24 ENCOUNTER — TELEPHONE (OUTPATIENT)
Dept: TRANSPLANT | Facility: CLINIC | Age: 68
End: 2018-05-24

## 2018-05-24 NOTE — TELEPHONE ENCOUNTER
----- Message from Cynthia Davenport sent at 5/24/2018 12:26 PM CDT -----  Contact: pt  Pt would like to let Dr. Salmeron know that she got a approval from [employer] and [employer] for her Rx Opsumit 10 mg tab    Thanks

## 2018-06-05 ENCOUNTER — TELEPHONE (OUTPATIENT)
Dept: TRANSPLANT | Facility: CLINIC | Age: 68
End: 2018-06-05

## 2018-06-07 ENCOUNTER — RESEARCH ENCOUNTER (OUTPATIENT)
Dept: RESEARCH | Facility: HOSPITAL | Age: 68
End: 2018-06-07

## 2018-06-08 ENCOUNTER — TELEPHONE (OUTPATIENT)
Dept: TRANSPLANT | Facility: CLINIC | Age: 68
End: 2018-06-08

## 2018-06-08 NOTE — PROGRESS NOTES
Date Consent Signed: 5/29/18 phone consent - pt received consent in the mail prior to this date    Registry Sponsor: [employer]    Registry Title/IRB Number: OPUS / IRB [id number]    Principle Investigator: ALFREDO Salmeron    Present for Discussion: pt and Olga Ldiia BARTH via phone consent    Prior to the informed consent (IC) being signed, the following was explained/discussed:    Patient given a copy of the IC for review (mailed to pt)   Purpose of Registry    Confidentiality and HIPAA Authorization for Release of Medical Records for the registry   Risks, Benefits and Alternatives to the registry   Participation in the registry is voluntary and patient may withdraw at anytime    Patient Verbalizes understanding of the above: Yes  Patients cardiologist (Faviola) notified patient enrolled into registry: Yes  Contact information for CRC and PI given to patient: Yes    Pt signed the informed consent form for the OPUS registry with an IRB approval date of 5/17/18. Prior to the consent, it was determined that patient met all inclusion and exclusion criteria. Each page of the consent form was reviewed with pt and all questions answered satisfactorily. A copy of signed consent was mailed to the pt . Consent was scanned into [facility] EPIC record and the original consent was placed in research study binder.    Consent received in the office on 6/4/18. Pt contacted - she did not initial page 1 regarding participation in other research studies.  Pt reported she is in no other research studies.     Pt reported she started taking the Opsimit medicaiton on 5/31/18.

## 2018-06-14 ENCOUNTER — TELEPHONE (OUTPATIENT)
Dept: TRANSPLANT | Facility: CLINIC | Age: 68
End: 2018-06-14

## 2018-06-28 ENCOUNTER — LAB VISIT (OUTPATIENT)
Dept: LAB | Facility: HOSPITAL | Age: 68
End: 2018-06-28
Payer: MEDICARE

## 2018-06-28 DIAGNOSIS — Z79.899 POLYPHARMACY: ICD-10-CM

## 2018-06-28 LAB
BASOPHILS # BLD AUTO: 0.01 K/UL
BASOPHILS NFR BLD: 0.2 %
DIFFERENTIAL METHOD: ABNORMAL
EOSINOPHIL # BLD AUTO: 0.1 K/UL
EOSINOPHIL NFR BLD: 2 %
ERYTHROCYTE [DISTWIDTH] IN BLOOD BY AUTOMATED COUNT: 16.7 %
HCT VFR BLD AUTO: 38.8 %
HGB BLD-MCNC: 11.9 G/DL
IMM GRANULOCYTES # BLD AUTO: 0.02 K/UL
IMM GRANULOCYTES NFR BLD AUTO: 0.4 %
LYMPHOCYTES # BLD AUTO: 1.1 K/UL
LYMPHOCYTES NFR BLD: 21.3 %
MCH RBC QN AUTO: 27 PG
MCHC RBC AUTO-ENTMCNC: 30.7 G/DL
MCV RBC AUTO: 88 FL
MONOCYTES # BLD AUTO: 0.4 K/UL
MONOCYTES NFR BLD: 7.8 %
NEUTROPHILS # BLD AUTO: 3.5 K/UL
NEUTROPHILS NFR BLD: 68.3 %
NRBC BLD-RTO: 0 /100 WBC
PLATELET # BLD AUTO: 215 K/UL
PMV BLD AUTO: 9.4 FL
RBC # BLD AUTO: 4.41 M/UL
WBC # BLD AUTO: 5.11 K/UL

## 2018-06-28 PROCEDURE — 85025 COMPLETE CBC W/AUTO DIFF WBC: CPT

## 2018-06-28 PROCEDURE — 36415 COLL VENOUS BLD VENIPUNCTURE: CPT

## 2018-07-10 ENCOUNTER — TELEPHONE (OUTPATIENT)
Dept: TRANSPLANT | Facility: CLINIC | Age: 68
End: 2018-07-10

## 2018-07-10 ENCOUNTER — NURSE TRIAGE (OUTPATIENT)
Dept: ADMINISTRATIVE | Facility: CLINIC | Age: 68
End: 2018-07-10

## 2018-07-10 ENCOUNTER — HOSPITAL ENCOUNTER (EMERGENCY)
Facility: HOSPITAL | Age: 68
Discharge: HOME | End: 2018-07-10
Attending: EMERGENCY MEDICINE
Payer: MEDICARE

## 2018-07-10 VITALS
SYSTOLIC BLOOD PRESSURE: 140 MMHG | DIASTOLIC BLOOD PRESSURE: 80 MMHG | RESPIRATION RATE: 18 BRPM | TEMPERATURE: 98 F | HEART RATE: 76 BPM | OXYGEN SATURATION: 97 %

## 2018-07-10 DIAGNOSIS — R04.0 EPISTAXIS: Primary | ICD-10-CM

## 2018-07-10 PROCEDURE — 99283 EMERGENCY DEPT VISIT LOW MDM: CPT | Mod: ,,, | Performed by: PHYSICIAN ASSISTANT

## 2018-07-10 PROCEDURE — 25000003 PHARM REV CODE 250: Performed by: PHYSICIAN ASSISTANT

## 2018-07-10 PROCEDURE — 99284 EMERGENCY DEPT VISIT MOD MDM: CPT

## 2018-07-10 RX ORDER — OXYMETAZOLINE HCL 0.05 %
2 SPRAY, NON-AEROSOL (ML) NASAL
Status: COMPLETED | OUTPATIENT
Start: 2018-07-10 | End: 2018-07-10

## 2018-07-10 RX ORDER — OXYMETAZOLINE HCL 0.05 %
1 SPRAY, NON-AEROSOL (ML) NASAL 2 TIMES DAILY
Qty: 15 ML | Refills: 0 | COMMUNITY
Start: 2018-07-10 | End: 2018-07-13

## 2018-07-10 RX ADMIN — OXYMETAZOLINE HYDROCHLORIDE 2 SPRAY: 5 SPRAY NASAL at 03:07

## 2018-07-10 NOTE — DISCHARGE INSTRUCTIONS
Use Afrin as needed for repeat nose bleeds  Follow-up with ENT  Follow instructions for recurrent nose bleeds

## 2018-07-10 NOTE — TELEPHONE ENCOUNTER
Call back for placed. No answer. Left message. @nd call back placed. EMS present at home at time of 2nd call back.

## 2018-07-10 NOTE — ED PROVIDER NOTES
Encounter Date: 7/10/2018       History     Chief Complaint   Patient presents with    Epistaxis     Patient has had a nosebleed x 3 hours. patient had a nosebleed a few days ago, not on blood thinners     68-year-old female to the ER with spontaneous epistaxis.  Patient developed epistaxis yesterday that was improved after pressure.  Earlier today she reports feeling a swelling in her right nostril and blew her nose and a scab came out.     Tonight while sleeping she woke up to bleeding from the right nostril.  Despite firm pressure at home the past 45 minutes bleeding has persisted.  She does not take anticoagulation medication.  She has no fever chills nausea vomiting or chest pain.           Review of patient's allergies indicates:   Allergen Reactions    Penicillins     Sulfa (sulfonamide antibiotics)      Past Medical History:   Diagnosis Date    Asthma     COPD (chronic obstructive pulmonary disease)     Diastolic dysfunction with heart failure 5/20/2015    Occl RCA, high grade prox LAD and LCfx cath [facility] Dec 2014 5/11/2015    Pulmonary HTN     Sleep apnea      Past Surgical History:   Procedure Laterality Date    BACK SURGERY      HYSTERECTOMY       Family History   Problem Relation Age of Onset    Cancer Sister      Social History   Substance Use Topics    Smoking status: Former Smoker     Packs/day: 0.25     Years: 15.00     Types: Cigarettes     Quit date: 11/2016    Smokeless tobacco: Never Used    Alcohol use No     Review of Systems   Constitutional: Negative for fever.   HENT: Positive for nosebleeds. Negative for sore throat.    Respiratory: Negative for shortness of breath.    Cardiovascular: Negative for chest pain.   Gastrointestinal: Negative for nausea.   Genitourinary: Negative for dysuria.   Musculoskeletal: Negative for back pain.   Skin: Negative for rash.   Neurological: Negative for weakness.   Hematological: Does not bruise/bleed easily.       Physical Exam     Initial  Vitals [07/10/18 0250]   BP Pulse Resp Temp SpO2   (!) 140/80 76 18 98.1 °F (36.7 °C) 97 %      MAP       --         Physical Exam    Constitutional: Vital signs are normal. She appears well-developed and well-nourished.   HENT:   Head: Normocephalic and atraumatic.   Left nostril appears clear  Moderate sized blood clot evident in the right nostril  Blood dripping in the posterior oropharynx evident    On my initial assessment bleeding is controlled   Eyes: Conjunctivae are normal.   Cardiovascular: Normal rate and regular rhythm.   Abdominal: Soft. Normal appearance and bowel sounds are normal.   Musculoskeletal: Normal range of motion.   Neurological: She is alert and oriented to person, place, and time.   Skin: Skin is warm and intact.   Psychiatric: She has a normal mood and affect. Her speech is normal and behavior is normal. Cognition and memory are normal.         ED Course   Procedures  Labs Reviewed - No data to display       Imaging Results    None          Medical Decision Making:   ED Management:  68-year-old female with spontaneous epistaxis.  I will treat with Afrin and observe  On reassessment at 4:15 AM no active bleeding  The patient will be discharged home with Afrin and follow-up instructions to see ENT  Epistaxis precautions given                      Clinical Impression:   The encounter diagnosis was Epistaxis.      Disposition:   Disposition: Discharged  Condition: Stable                        Cam Wilder PA-C  07/10/18 0423

## 2018-07-10 NOTE — TELEPHONE ENCOUNTER
Caller calling to state that she has attempted to stop nosebleed for past 2 hours. Caller is home alone. States that it is gushing out of both sides at this time. Advised to hang up and call #[phone] now.    Reason for Disposition   Sounds like a life-threatening emergency to the triager    Protocols used: ST NOSEBLEED-A-

## 2018-08-20 ENCOUNTER — TELEPHONE (OUTPATIENT)
Dept: TRANSPLANT | Facility: CLINIC | Age: 68
End: 2018-08-20

## 2018-09-17 ENCOUNTER — HOSPITAL ENCOUNTER (OUTPATIENT)
Facility: HOSPITAL | Age: 68
Discharge: HOME | End: 2018-09-17
Attending: INTERNAL MEDICINE | Admitting: INTERNAL MEDICINE
Payer: MEDICARE

## 2018-09-17 PROCEDURE — C1894 INTRO/SHEATH, NON-LASER: HCPCS

## 2018-09-17 PROCEDURE — 25000003 PHARM REV CODE 250

## 2018-09-17 PROCEDURE — 93451 RIGHT HEART CATH: CPT | Mod: 26,,, | Performed by: INTERNAL MEDICINE

## 2018-09-17 NOTE — DISCHARGE SUMMARY
Date of admit to cath lab: 9/17/2018      Date of discharge from cath lab: 9/17/2018      Principal diagnosis: PH    Discharge attending physician: Le Salmeron MD    Hospital Course/Outcome of the treatment, procedures or surgery: Pt admitted for RHC.   See CVIS/cath lab procedure report in EPIC  for full report of today's procedure.    Disposition of the case (d/c disposition): home    Discharge Medication List: see med card    Plan for follow up care, diet, activity level: F/U as scheduled. Resume low Na diet.  Activity as tolerated    Condition on discharge from Cath lab: Stable.

## 2018-09-17 NOTE — DISCHARGE INSTRUCTIONS

## 2018-09-17 NOTE — H&P
Subjective:      HPI:     67 y.o. AAF with mild obstructive lung disease, severe pulmonary hypertension (WHO group 1), moderate to severe RV dysfunction, chronic hypercapnic respiratory failure, , obesity, active tobacco dependence, GLENN on CPAP, and diabetes who comes today for a regular F/U. She  underwent a RHC that showed severe PH with normal PCWP. After which she was started on Tyvaso.        Since last visit, pt says her back has been hurting so she didn't do her walk today- plan to go back to get another epidural shot. At last visit we added adempas- she is on 1.5mg tid and she says he feels good- is about to increase to 2mg.  She sleeps with her bipap on but still feels sleepy during the day- had gotten a new machine towards the end of last year, she says her O2 tank beeps at night- worries she is not getting enough O2 at night. If she takes her time, says she can do housework all day though she tires out quickly sweeping and mopping, but says it IS better since starting the adempas. No swelling  In her legs, no pain in her chest except her right side, says it's sensitive when he presses it, on top the rib.  No LH.      Was in the ER with pain in her side when she coughs- was given antiinflammatory (NAPROXEN) and cough suppressant.       Remains on tyvaso 9 breaths 4 times a day-  No PND or orthopnea.        + constipation, no SE from the tyvaso or adempas     No 6mw today   Feb  282m ( 335 m in March 2017   )                                              O2 sat  93 -> 86 %  ->     95%    On 2L                                                HR  80 -> 104                                                                  BP   108/72   -> 133/ 80                                                        Matilda  3   -> 3     TTE 2/9/18  The right ventricle is upper limit of normal measuring 4.2 cm at the base in the apical right ventricle-focused view. Global right ventricular systolic function appears low normal.  Tricuspid annular plane systolic excursion (TAPSE) is   2.1 cm. Tissue Doppler-derived tricuspid annular peak systolic velocity (S prime) is 14.0 cm/s. The estimated PA systolic pressure is 53 mmHg    1 - Normal left ventricular systolic function (EF 60-65%).     2 - Wall motion abnormalities.     3 - Biatrial enlargement.     4 - Impaired LV relaxation, elevated LAP (grade 2 diastolic dysfunction).     5 - Right ventricle is upper limit of normal in size with low normal systolic function.     6 - Trivial tricuspid regurgitation.     7 - Trivial pericardial effusion.     8 - Pulmonary hypertension. The estimated PA systolic pressure is 53 mmHg.     RHC done 9/2016  AOPRES: 131/84 (100)  AOSAT: 97  FICKCI: 2.46  FICKCO: 4.99  PAPRES: 109/29 (61)  PASAT: 69  PVR: 10.01  PWPRES: 13/14 (14)  RAPRES: 12/11 (10)  RVPRES: 94/6, 14                Past Medical History:   Diagnosis Date    Asthma      COPD (chronic obstructive pulmonary disease)      Diastolic dysfunction with heart failure 5/20/2015    Occl RCA, high grade prox LAD and LCfx cath [facility] Dec 2014 5/11/2015    Pulmonary HTN      Sleep apnea              Past Surgical History:   Procedure Laterality Date    BACK SURGERY        HYSTERECTOMY              OB History      No data available          Review of Systems   Constitution: Negative. Negative for chills, decreased appetite, diaphoresis, fever, weakness, malaise/fatigue, night sweats, weight gain and weight loss.   Eyes: Negative.    Cardiovascular: Positive for dyspnea on exertion. Negative for chest pain, claudication, cyanosis, irregular heartbeat, leg swelling, near-syncope, orthopnea, palpitations, paroxysmal nocturnal dyspnea and syncope.   Respiratory: Negative for cough, hemoptysis and shortness of breath.    Endocrine: Negative.    Hematologic/Lymphatic: Negative.    Skin: Negative for color change, dry skin and nail changes.   Musculoskeletal: Positive for back pain.   Gastrointestinal:  "Positive for constipation.   Genitourinary: Negative.          Objective:   Blood pressure (!) 100/59, pulse 83, height 5' 6" (1.676 m), weight 98.6 kg (217 lb 6 oz), SpO2 (!) 93 %.body mass index is 35.09 kg/m².  Physical Exam   Constitutional: She is oriented to person, place, and time. Vital signs are normal. She appears well-developed and well-nourished.   HENT:   Head: Normocephalic.   Eyes: Pupils are equal, round, and reactive to light.   Neck: Neck supple. No JVD present.   Cardiovascular: Normal rate, regular rhythm, normal heart sounds and intact distal pulses.  PMI is displaced.  Exam reveals no gallop and no friction rub.    No murmur heard.  Accentuated S2   Pulmonary/Chest: Effort normal and breath sounds normal. No respiratory distress. She has no wheezes. She has no rales.   Abdominal: Soft. Bowel sounds are normal. She exhibits no distension. There is no tenderness. There is no rebound.   Musculoskeletal: She exhibits no edema.   Neurological: She is alert and oriented to person, place, and time.   Nursing note and vitals reviewed.        Lab Results   Component Value Date    BNP 16 09/17/2018     09/17/2018    K 4.3 09/17/2018    MG 2.1 09/17/2018     09/17/2018    CO2 26 09/17/2018    BUN 22 09/17/2018    CREATININE 1.3 09/17/2018     (H) 09/17/2018    HGBA1C 6.1 05/02/2015    AST 16 09/17/2018    ALT 15 09/17/2018    ALBUMIN 4.1 09/17/2018    PROT 7.3 09/17/2018    BILITOT 0.7 09/17/2018       Magnesium   Date Value Ref Range Status   09/17/2018 2.1 1.6 - 2.6 mg/dL Final       Lab Results   Component Value Date    WBC 4.66 09/17/2018    HGB 11.9 (L) 09/17/2018    HCT 38.9 09/17/2018    MCV 88 09/17/2018     09/17/2018       Lab Results   Component Value Date    INR 1.0 09/07/2016    INR 1.1 05/11/2015    INR 1.1 05/10/2015       BNP   Date Value Ref Range Status   09/17/2018 16 0 - 99 pg/mL Final     Comment:     Values of less than 100 pg/ml are consistent with non-CHF " populations.   05/14/2018 11 0 - 99 pg/mL Final     Comment:     Values of less than 100 pg/ml are consistent with non-CHF populations.   02/09/2018 12 0 - 99 pg/mL Final     Comment:     Values of less than 100 pg/ml are consistent with non-CHF populations.       LD   Date Value Ref Range Status   05/04/2015 380 (H) 110 - 260 U/L Final     Comment:     Results are increased in hemolyzed samples.   05/01/2015 301 (H) 110 - 260 U/L Final     Comment:     Results are increased in hemolyzed samples.              Assessment:      1. Pulmonary hypertension- SEVERE on RHC with normal PCW, her COPD is moderate by PFTs, but on her imaging she has only mild centrilobular emphysema (much more prominent are   her enlarged PA, RV and RA her COPD is moderate by PFTs, but on her imaging she has only mild centrilobular emphysema (much more prominent are her enlarged PA, RV and RA)  Today, BNP is normal, last echo a little better, euvolemic on exam   2. RVF (right ventricular failure)    3. Chronic pulmonary heart disease    4. Chronic diastolic heart failure    5. Other emphysema    6. Coronary artery disease involving native coronary artery of native heart without angina pectoris    7. TREVER (acute kidney injury)    8. Chronic respiratory failure with hypoxia    9. Class 2 severe obesity due to excess calories with serious comorbidity and body mass index (BMI) of 35.0 to 35.9 in adult       WHO group 1, FC II     Plan:      RHC R IJ, 7 Fr sheath with local lidocaine, micropuncture kit and US guidance.    I have explained the risks, benefits and alternatives of the procedure in detail. The patient voices understanding and all questions have been answered,. The patient agrees to proceed as planned.

## 2019-01-31 ENCOUNTER — OFFICE VISIT (OUTPATIENT)
Dept: TRANSPLANT | Facility: CLINIC | Age: 69
End: 2019-01-31
Payer: MEDICARE

## 2019-01-31 ENCOUNTER — HOSPITAL ENCOUNTER (OUTPATIENT)
Dept: PULMONOLOGY | Facility: CLINIC | Age: 69
Discharge: HOME | End: 2019-01-31
Payer: MEDICARE

## 2019-01-31 VITALS
HEART RATE: 89 BPM | WEIGHT: 214.75 LBS | DIASTOLIC BLOOD PRESSURE: 48 MMHG | SYSTOLIC BLOOD PRESSURE: 99 MMHG | HEIGHT: 65 IN | HEIGHT: 66 IN | OXYGEN SATURATION: 91 % | WEIGHT: 215.38 LBS | BODY MASS INDEX: 34.51 KG/M2 | BODY MASS INDEX: 35.88 KG/M2

## 2019-01-31 DIAGNOSIS — J43.8 OTHER EMPHYSEMA: ICD-10-CM

## 2019-01-31 DIAGNOSIS — E66.01 CLASS 2 SEVERE OBESITY DUE TO EXCESS CALORIES WITH SERIOUS COMORBIDITY AND BODY MASS INDEX (BMI) OF 35.0 TO 35.9 IN ADULT: ICD-10-CM

## 2019-01-31 DIAGNOSIS — I27.20 PULMONARY HYPERTENSION: Primary | ICD-10-CM

## 2019-01-31 DIAGNOSIS — I50.810 RVF (RIGHT VENTRICULAR FAILURE): ICD-10-CM

## 2019-01-31 DIAGNOSIS — I50.32 CHRONIC DIASTOLIC HEART FAILURE: ICD-10-CM

## 2019-01-31 DIAGNOSIS — J96.11 CHRONIC RESPIRATORY FAILURE WITH HYPOXIA: ICD-10-CM

## 2019-01-31 DIAGNOSIS — I27.20 PULMONARY HTN: ICD-10-CM

## 2019-01-31 DIAGNOSIS — I25.10 CORONARY ARTERY DISEASE INVOLVING NATIVE CORONARY ARTERY OF NATIVE HEART WITHOUT ANGINA PECTORIS: ICD-10-CM

## 2019-01-31 PROCEDURE — 99214 PR OFFICE/OUTPT VISIT, EST, LEVL IV, 30-39 MIN: ICD-10-PCS | Mod: S$GLB,,, | Performed by: INTERNAL MEDICINE

## 2019-01-31 PROCEDURE — 94618 PULMONARY STRESS TESTING: ICD-10-PCS | Mod: S$GLB,,, | Performed by: INTERNAL MEDICINE

## 2019-01-31 PROCEDURE — 3078F PR MOST RECENT DIASTOLIC BLOOD PRESSURE < 80 MM HG: ICD-10-PCS | Mod: CPTII,S$GLB,, | Performed by: INTERNAL MEDICINE

## 2019-01-31 PROCEDURE — 99999 PR PBB SHADOW E&M-EST. PATIENT-LVL III: ICD-10-PCS | Mod: PBBFAC,,, | Performed by: INTERNAL MEDICINE

## 2019-01-31 PROCEDURE — 99214 OFFICE O/P EST MOD 30 MIN: CPT | Mod: S$GLB,,, | Performed by: INTERNAL MEDICINE

## 2019-01-31 PROCEDURE — 3078F DIAST BP <80 MM HG: CPT | Mod: CPTII,S$GLB,, | Performed by: INTERNAL MEDICINE

## 2019-01-31 PROCEDURE — 99999 PR PBB SHADOW E&M-EST. PATIENT-LVL III: CPT | Mod: PBBFAC,,, | Performed by: INTERNAL MEDICINE

## 2019-01-31 PROCEDURE — 1101F PT FALLS ASSESS-DOCD LE1/YR: CPT | Mod: CPTII,S$GLB,, | Performed by: INTERNAL MEDICINE

## 2019-01-31 PROCEDURE — 3074F PR MOST RECENT SYSTOLIC BLOOD PRESSURE < 130 MM HG: ICD-10-PCS | Mod: CPTII,S$GLB,, | Performed by: INTERNAL MEDICINE

## 2019-01-31 PROCEDURE — 3074F SYST BP LT 130 MM HG: CPT | Mod: CPTII,S$GLB,, | Performed by: INTERNAL MEDICINE

## 2019-01-31 PROCEDURE — 94618 PULMONARY STRESS TESTING: CPT | Mod: S$GLB,,, | Performed by: INTERNAL MEDICINE

## 2019-01-31 PROCEDURE — 1101F PR PT FALLS ASSESS DOC 0-1 FALLS W/OUT INJ PAST YR: ICD-10-PCS | Mod: CPTII,S$GLB,, | Performed by: INTERNAL MEDICINE

## 2019-01-31 RX ORDER — PROMETHAZINE HYDROCHLORIDE AND CODEINE PHOSPHATE 6.25; 1 MG/5ML; MG/5ML
SOLUTION ORAL
Refills: 0 | COMMUNITY
Start: 2019-01-21

## 2019-01-31 RX ORDER — PANTOPRAZOLE SODIUM 40 MG/1
TABLET, DELAYED RELEASE ORAL
COMMUNITY
Start: 2019-01-09 | End: 2020-02-21

## 2019-01-31 NOTE — PROGRESS NOTES
Subjective:     HPI:    68 y.o. AAF with mild obstructive lung disease, severe pulmonary hypertension (WHO group 1), moderate to severe RV dysfunction, chronic hypercapnic respiratory failure, , obesity, active tobacco dependence, GLENN on CPAP, and diabetes who comes today for a regular F/U. She  underwent a RHC that showed severe PH with normal PCWP. After which she was started on Tyvaso.       Since last visit, pt had a sinus infection and took 10 days of abx about 2 wk ago, feels a lot better. Says her breathing is pretty good- will walk, but takes her O2 off and says she has learned to breathe without it. Did not have to stop walking in from the garage as she had her O2 on- but then turned it off when she was penitentiary here-does not get SOB with her lighter ADls- leaves a crack in the BR door so the steam does not overwhelm her. No swelling In her legs, no pain in her chest,  No LH.     Did increase her tyvaso to 12 breaths and has noticed further improvement in her SOB    No PND or orthopnea.      + constipation, no SE from the tyvaso or adempas     6mw today   183m (Feb  282m ( 335 m in March 2017   )         Says she could have gone farther had it been cooler over there                                     O2 sat  95 -> 88 %  ->     94%    On 4L                                                HR  89-> 102                                                                  BP   108/54  -> 129/ 60                                                        Matilda  2   -> 4    RHC 9/18  RA: 16/16 (14)  RV: 65/4  RVEDP: 11   PW: 15/15 (16)  PA: 60/19 (37)  AO: 105/66 (79)  AO_SAT: 100  PA_SAT: 72    CONDITION 1 (9/17/2018 11:28:21):  BP: 110/65  HR: 78  FICKCI: 2.9400  FICKCO: 6.0500  PVR: 331.0000      TTE 2/9/18  The right ventricle is upper limit of normal measuring 4.2 cm at the base in the apical right ventricle-focused view. Global right ventricular systolic function appears low normal. Tricuspid annular plane systolic  excursion (TAPSE) is   2.1 cm. Tissue Doppler-derived tricuspid annular peak systolic velocity (S prime) is 14.0 cm/s. The estimated PA systolic pressure is 53 mmHg    1 - Normal left ventricular systolic function (EF 60-65%).     2 - Wall motion abnormalities.     3 - Biatrial enlargement.     4 - Impaired LV relaxation, elevated LAP (grade 2 diastolic dysfunction).     5 - Right ventricle is upper limit of normal in size with low normal systolic function.     6 - Trivial tricuspid regurgitation.     7 - Trivial pericardial effusion.     8 - Pulmonary hypertension. The estimated PA systolic pressure is 53 mmHg.     RHC done 9/2016  AOPRES: 131/84 (100)  AOSAT: 97  FICKCI: 2.46  FICKCO: 4.99  PAPRES: 109/29 (61)  PASAT: 69  PVR: 10.01  PWPRES: 13/14 (14)  RAPRES: 12/11 (10)  RVPRES: 94/6, 14        Past Medical History:   Diagnosis Date    Asthma     COPD (chronic obstructive pulmonary disease)     Diastolic dysfunction with heart failure 5/20/2015    Occl RCA, high grade prox LAD and LCfx cath [facility] Dec 2014 5/11/2015    Pulmonary HTN     Sleep apnea      Past Surgical History:   Procedure Laterality Date    BACK SURGERY      HEART CATH-RIGHT Right 9/17/2018    Performed by Le Salmeron MD at [facility] CATH LAB    HYSTERECTOMY       OB History     No data available        Review of Systems   Constitution: Negative. Negative for chills, decreased appetite, diaphoresis, fever, weakness, malaise/fatigue, night sweats, weight gain and weight loss.   Eyes: Negative.    Cardiovascular: Positive for dyspnea on exertion. Negative for chest pain, claudication, cyanosis, irregular heartbeat, leg swelling, near-syncope, orthopnea, palpitations, paroxysmal nocturnal dyspnea and syncope.   Respiratory: Negative for cough, hemoptysis and shortness of breath.    Endocrine: Negative.    Hematologic/Lymphatic: Negative.    Skin: Negative for color change, dry skin and nail changes.   Musculoskeletal: Positive for back pain.  "  Gastrointestinal: Positive for constipation.   Genitourinary: Negative.        Objective:   Blood pressure (!) 99/48, pulse 89, height 5' 6" (1.676 m), weight 97.4 kg (214 lb 11.7 oz), SpO2 (!) 91 %.body mass index is 34.66 kg/m².  Physical Exam   Constitutional: She is oriented to person, place, and time. Vital signs are normal. She appears well-developed and well-nourished.   HENT:   Head: Normocephalic.   Eyes: Pupils are equal, round, and reactive to light.   Neck: Neck supple. No JVD present.   Cardiovascular: Normal rate, regular rhythm, normal heart sounds and intact distal pulses. PMI is displaced. Exam reveals no gallop and no friction rub.   No murmur heard.  Accentuated S2   Pulmonary/Chest: Effort normal and breath sounds normal. No respiratory distress. She has no wheezes. She has no rales.   Abdominal: Soft. Bowel sounds are normal. She exhibits no distension. There is no tenderness. There is no rebound.   Musculoskeletal: She exhibits no edema.   Neurological: She is alert and oriented to person, place, and time.   Nursing note and vitals reviewed.      Labs:    Chemistry        Component Value Date/Time     09/17/2018 0900    K 4.3 09/17/2018 0900     09/17/2018 0900    CO2 26 09/17/2018 0900    BUN 22 09/17/2018 0900    CREATININE 1.3 09/17/2018 0900     (H) 09/17/2018 0900        Component Value Date/Time    CALCIUM 9.7 09/17/2018 0900    ALKPHOS 73 09/17/2018 0900    AST 16 09/17/2018 0900    ALT 15 09/17/2018 0900    BILITOT 0.7 09/17/2018 0900          Magnesium   Date Value Ref Range Status   09/17/2018 2.1 1.6 - 2.6 mg/dL Final     Lab Results   Component Value Date    WBC 5.58 01/31/2019    HGB 11.3 (L) 01/31/2019    HCT 37.0 01/31/2019     01/31/2019     Lab Results   Component Value Date    INR 1.0 09/07/2016    INR 1.1 05/11/2015    INR 1.1 05/10/2015     BNP   Date Value Ref Range Status   09/17/2018 16 0 - 99 pg/mL Final     Comment:     Values of less than " 100 pg/ml are consistent with non-CHF populations.   05/14/2018 11 0 - 99 pg/mL Final     Comment:     Values of less than 100 pg/ml are consistent with non-CHF populations.   02/09/2018 12 0 - 99 pg/mL Final     Comment:     Values of less than 100 pg/ml are consistent with non-CHF populations.     LD   Date Value Ref Range Status   05/04/2015 380 (H) 110 - 260 U/L Final     Comment:     Results are increased in hemolyzed samples.   05/01/2015 301 (H) 110 - 260 U/L Final     Comment:     Results are increased in hemolyzed samples.     No results found for this or any previous visit.  No results found for this or any previous visit.    Assessment:      1. Pulmonary hypertension- SEVERE on RHC with normal PCW, her COPD is moderate by PFTs, but on her imaging she has only mild centrilobular emphysema (much more prominent are   her enlarged PA, RV and RA her COPD is moderate by PFTs, but on her imaging she has only mild centrilobular emphysema (much more prominent are her enlarged PA, RV and RA)  Today, BNP is normal, last echo a little better, euvolemic on exam   2. RVF (right ventricular failure)    3. Chronic pulmonary heart disease    4. Chronic diastolic heart failure    5. Other emphysema    6. Coronary artery disease involving native coronary artery of native heart without angina pectoris    7. TREVER (acute kidney injury)    8. Chronic respiratory failure with hypoxia    9. Class 2 severe obesity due to excess calories with serious comorbidity and body mass index (BMI) of 35.0 to 35.9 in adult      WHO group 1, FC II    Plan:   No medicine changes today    Needs to Keep oxygen on around the clock!  Asked her to Call the durable medical supply [employer] and [employer] to see if they can help her get a smaller portable ([employer])- needs to find one that will take insurance    Keep salt intake to under 2000 mg sodium, fluids to under 2 L (64 oz)    Should call us for increased SOB, swelling or wt changes    F/u  2-3 mo with labs, visit and repeat echo

## 2019-01-31 NOTE — PATIENT INSTRUCTIONS
PH on Facebook: A couple of our patients created a group on Facebook for people living in [place] with PH, it's called [place] [facility]rienjessica.    Check it out!      [place] PH Support Group  February 20, 2019  11am-1pm  [employer]  [address] [address]  RSVP to Brit Ruff by 2/18/19 at [phone] or [email or web address]      No medicine changes today    Keep salt intake to under 2000 mg sodium, fluids to under 2 L (64 oz)    Check your weights every morning after getting out of bed and urinating. If your weight goes up 3# overnight or 5# in one week call us    Keep your oxygen on around the clock!  Call the [employer] medical supply company and [employer] to see if they can help you get a smaller portable ([employer])

## 2019-02-02 NOTE — PROCEDURES
Bessie Davis is a 68 y.o.  female patient, who presents for a 6 minute walk test ordered by MD Isis.  The diagnosis is Pulmonary Hypertension.  The patient's BMI is 35.9 kg/m2.  Predicted distance (lower limit of normal) is 263.37 meters.      Test Results:    The test was completed with stops.  The patient stopped 1 times for a total of 122 seconds.  The total time walked was 238 seconds.  During walking, the patient reported:  Dyspnea. The patient used no assistive devices and supplemental oxygen during testing.     1/31/2019--------Distance: 182.88 meters (600 feet)     O2 Sat % Supplemental Oxygen Heart Rate Blood Pressure Matilda Scale   Pre-exercise  (Resting) 95 % 4 L/M 89 bpm 108/54 mmHg 2   During Exercise 88 % 4 L/M 102 bpm 129/60 mmHg 4   Post-exercise  (Recovery) 94 % 4 L/M  96 bpm   mmHg       Recovery Time: 83 seconds    Performing nurse/tech: Shelley ALFARO      PREVIOUS STUDY:   The patient had a previous study.  02/09/2018---------Distance: 281.94 meters (925 feet)       O2 Sat % Supplemental Oxygen Heart Rate Blood Pressure Matilda Scale   Pre-exercise  (Resting) 93 % Room Air 80 bpm 108/72 mmHg 3   During Exercise 86 % Room Air 104 bpm 133/80 mmHg 3   Post-exercise    94 % Room Air  89 bpm              CLINICAL INTERPRETATION:  Six minute walk distance is 182.88 meters (600 feet) with somewhat heavy dyspnea.  During exercise, there was significant desaturation while breathing room air.  Both blood pressure and heart rate remained stable with walking.  The patient did not report non-pulmonary symptoms during exercise.  Significant exercise impairment is likely due to desaturation.  The patient did complete the study, walking 238 seconds of the 360 second test.  The patient may benefit from using supplemental oxygen during exertion.  Since the previous study in February 2018, exercise capacity is significantly worse.  Based upon age and body mass index, exercise capacity is less than  predicted.

## 2019-02-08 ENCOUNTER — TELEPHONE (OUTPATIENT)
Dept: TRANSPLANT | Facility: CLINIC | Age: 69
End: 2019-02-08

## 2019-02-08 NOTE — TELEPHONE ENCOUNTER
----- Message from Priscila Beckford MA sent at 2/8/2019  9:57 AM CST -----  Contact: patient called      ----- Message -----  From: Henrietta Saldana MA  Sent: 2/8/2019   9:47 AM  To: [facility]    The patient need her medication refill Riociguat ( Adempas) 2.5. Mg please call [facility]  At [phone]. Thank you.

## 2019-03-14 ENCOUNTER — TELEPHONE (OUTPATIENT)
Dept: TRANSPLANT | Facility: CLINIC | Age: 69
End: 2019-03-14

## 2019-03-26 ENCOUNTER — TELEPHONE (OUTPATIENT)
Dept: TRANSPLANT | Facility: CLINIC | Age: 69
End: 2019-03-26

## 2019-03-26 NOTE — TELEPHONE ENCOUNTER
Per Florencio, RN with [facility], pt can increase to 1.5 Adempas. However, pt had previously been on Adempas for about a year, and had already reached 2.5mg. It is currently unclear why patient had to retitrate, as this RN does not recollect being informed of lapse in therapy for 3 days or greater, although we were aware that patient had been difficult to reach to arrange shipment in mid-March. Message left w/Natanael, at [facility] regarding same.

## 2019-04-15 ENCOUNTER — HOSPITAL ENCOUNTER (OUTPATIENT)
Dept: PULMONOLOGY | Facility: CLINIC | Age: 69
Discharge: HOME | End: 2019-04-15
Payer: MEDICARE

## 2019-04-15 ENCOUNTER — OFFICE VISIT (OUTPATIENT)
Dept: TRANSPLANT | Facility: CLINIC | Age: 69
End: 2019-04-15
Payer: MEDICARE

## 2019-04-15 ENCOUNTER — HOSPITAL ENCOUNTER (OUTPATIENT)
Dept: CARDIOLOGY | Facility: CLINIC | Age: 69
Discharge: HOME | End: 2019-04-15
Attending: INTERNAL MEDICINE
Payer: MEDICARE

## 2019-04-15 VITALS
OXYGEN SATURATION: 99 % | HEIGHT: 66 IN | SYSTOLIC BLOOD PRESSURE: 135 MMHG | DIASTOLIC BLOOD PRESSURE: 52 MMHG | HEIGHT: 66 IN | BODY MASS INDEX: 33.91 KG/M2 | WEIGHT: 211 LBS | SYSTOLIC BLOOD PRESSURE: 107 MMHG | WEIGHT: 217.38 LBS | HEART RATE: 80 BPM | DIASTOLIC BLOOD PRESSURE: 80 MMHG | BODY MASS INDEX: 34.93 KG/M2 | HEART RATE: 80 BPM

## 2019-04-15 VITALS — WEIGHT: 217 LBS | BODY MASS INDEX: 34.87 KG/M2 | HEIGHT: 66 IN

## 2019-04-15 DIAGNOSIS — J96.11 CHRONIC RESPIRATORY FAILURE WITH HYPOXIA: ICD-10-CM

## 2019-04-15 DIAGNOSIS — I50.32 CHRONIC DIASTOLIC HEART FAILURE: Primary | ICD-10-CM

## 2019-04-15 DIAGNOSIS — I27.20 PULMONARY HTN: ICD-10-CM

## 2019-04-15 DIAGNOSIS — I27.9 CHRONIC PULMONARY HEART DISEASE: ICD-10-CM

## 2019-04-15 DIAGNOSIS — I50.810 RVF (RIGHT VENTRICULAR FAILURE): ICD-10-CM

## 2019-04-15 DIAGNOSIS — I27.20 PULMONARY HYPERTENSION: ICD-10-CM

## 2019-04-15 DIAGNOSIS — E66.01 CLASS 2 SEVERE OBESITY DUE TO EXCESS CALORIES WITH SERIOUS COMORBIDITY AND BODY MASS INDEX (BMI) OF 35.0 TO 35.9 IN ADULT: ICD-10-CM

## 2019-04-15 DIAGNOSIS — J43.8 OTHER EMPHYSEMA: ICD-10-CM

## 2019-04-15 LAB
ASCENDING AORTA: 3.25 CM
AV INDEX (PROSTH): 0.64
AV MEAN GRADIENT: 11.08 MMHG
AV PEAK GRADIENT: 21.53 MMHG
AV VALVE AREA: 2.24 CM2
AV VELOCITY RATIO: 0.61
BSA FOR ECHO PROCEDURE: 2.11 M2
CV ECHO LV RWT: 0.34 CM
DOP CALC AO PEAK VEL: 2.32 M/S
DOP CALC AO VTI: 44.79 CM
DOP CALC LVOT AREA: 3.49 CM2
DOP CALC LVOT DIAMETER: 2.11 CM
DOP CALC LVOT PEAK VEL: 1.4 M/S
DOP CALC LVOT STROKE VOLUME: 100.13 CM3
DOP CALCLVOT PEAK VEL VTI: 28.65 CM
E WAVE DECELERATION TIME: 215.54 MSEC
E/A RATIO: 0.86
E/E' RATIO: 8.56
ECHO LV POSTERIOR WALL: 0.89 CM (ref 0.6–1.1)
FRACTIONAL SHORTENING: 43 % (ref 28–44)
INTERVENTRICULAR SEPTUM: 0.83 CM (ref 0.6–1.1)
LA MAJOR: 5.3 CM
LA MINOR: 5.2 CM
LA WIDTH: 3.02 CM
LEFT ATRIUM SIZE: 4.03 CM
LEFT ATRIUM VOLUME INDEX: 26.5 ML/M2
LEFT ATRIUM VOLUME: 54.31 CM3
LEFT INTERNAL DIMENSION IN SYSTOLE: 3.02 CM (ref 2.1–4)
LEFT VENTRICLE DIASTOLIC VOLUME INDEX: 66.11 ML/M2
LEFT VENTRICLE DIASTOLIC VOLUME: 135.28 ML
LEFT VENTRICLE MASS INDEX: 80.4 G/M2
LEFT VENTRICLE SYSTOLIC VOLUME INDEX: 17.4 ML/M2
LEFT VENTRICLE SYSTOLIC VOLUME: 35.59 ML
LEFT VENTRICULAR INTERNAL DIMENSION IN DIASTOLE: 5.3 CM (ref 3.5–6)
LEFT VENTRICULAR MASS: 164.56 G
LV LATERAL E/E' RATIO: 7.64
LV SEPTAL E/E' RATIO: 9.73
MV PEAK A VEL: 1.24 M/S
MV PEAK E VEL: 1.07 M/S
PISA TR MAX VEL: 4.38 M/S
PULM VEIN S/D RATIO: 1.22
PV PEAK D VEL: 0.45 M/S
PV PEAK S VEL: 0.55 M/S
RA MAJOR: 5.77 CM
RA PRESSURE: 15 MMHG
RA WIDTH: 4.8 CM
RV TISSUE DOPPLER FREE WALL SYSTOLIC VELOCITY 1 (APICAL 4 CHAMBER VIEW): 8.31 M/S
SINUS: 2.8 CM
STJ: 2.84 CM
TDI LATERAL: 0.14
TDI SEPTAL: 0.11
TDI: 0.13
TR MAX PG: 76.74 MMHG
TRICUSPID ANNULAR PLANE SYSTOLIC EXCURSION: 2.2 CM
TV REST PULMONARY ARTERY PRESSURE: 92 MMHG

## 2019-04-15 PROCEDURE — 3078F DIAST BP <80 MM HG: CPT | Mod: CPTII,S$GLB,, | Performed by: INTERNAL MEDICINE

## 2019-04-15 PROCEDURE — 99999 PR PBB SHADOW E&M-EST. PATIENT-LVL V: ICD-10-PCS | Mod: PBBFAC,,, | Performed by: INTERNAL MEDICINE

## 2019-04-15 PROCEDURE — 1101F PT FALLS ASSESS-DOCD LE1/YR: CPT | Mod: CPTII,S$GLB,, | Performed by: INTERNAL MEDICINE

## 2019-04-15 PROCEDURE — 99214 PR OFFICE/OUTPT VISIT, EST, LEVL IV, 30-39 MIN: ICD-10-PCS | Mod: S$GLB,,, | Performed by: INTERNAL MEDICINE

## 2019-04-15 PROCEDURE — 1101F PR PT FALLS ASSESS DOC 0-1 FALLS W/OUT INJ PAST YR: ICD-10-PCS | Mod: CPTII,S$GLB,, | Performed by: INTERNAL MEDICINE

## 2019-04-15 PROCEDURE — 94618 PULMONARY STRESS TESTING: CPT | Mod: S$GLB,,, | Performed by: INTERNAL MEDICINE

## 2019-04-15 PROCEDURE — 99214 OFFICE O/P EST MOD 30 MIN: CPT | Mod: S$GLB,,, | Performed by: INTERNAL MEDICINE

## 2019-04-15 PROCEDURE — 3074F PR MOST RECENT SYSTOLIC BLOOD PRESSURE < 130 MM HG: ICD-10-PCS | Mod: CPTII,S$GLB,, | Performed by: INTERNAL MEDICINE

## 2019-04-15 PROCEDURE — 99999 PR PBB SHADOW E&M-EST. PATIENT-LVL V: CPT | Mod: PBBFAC,,, | Performed by: INTERNAL MEDICINE

## 2019-04-15 PROCEDURE — 93306 TTE W/DOPPLER COMPLETE: CPT | Mod: S$GLB,,, | Performed by: INTERNAL MEDICINE

## 2019-04-15 PROCEDURE — 93306 TRANSTHORACIC ECHO (TTE) COMPLETE (CUPID ONLY): ICD-10-PCS | Mod: S$GLB,,, | Performed by: INTERNAL MEDICINE

## 2019-04-15 PROCEDURE — 3078F PR MOST RECENT DIASTOLIC BLOOD PRESSURE < 80 MM HG: ICD-10-PCS | Mod: CPTII,S$GLB,, | Performed by: INTERNAL MEDICINE

## 2019-04-15 PROCEDURE — 3074F SYST BP LT 130 MM HG: CPT | Mod: CPTII,S$GLB,, | Performed by: INTERNAL MEDICINE

## 2019-04-15 PROCEDURE — 94618 PULMONARY STRESS TESTING: ICD-10-PCS | Mod: S$GLB,,, | Performed by: INTERNAL MEDICINE

## 2019-04-15 NOTE — PATIENT INSTRUCTIONS
We will increase adempas to 2.5mg three times a day    Continue opsummit and tyvaso as you are    We will do a right heart catheterization this summer to measure the blood pressure in your lungs-  Take your usual medicines and eat a light breakfast that am.  Labs on 2nd floor- then go to [facility] cath lab waiting area and check in      PH on Facebook: A couple of our patients created a group on Facebook for people living in [place] with PH, it's called [place] [facility]riends.    Check it out!      PH Support group:    June 19, 2019  11:00 AM - 1:00 PM  [employer]  [address]  Topic: Nutrition, Nutrition Bingo and Meal of Fortune Game.  Meeting sponsor is [employer]    [facility] to Brit Ruff [phone] or [email or web address] by 6/17/19

## 2019-04-15 NOTE — PROCEDURES
Bessie Davis is a 68 y.o.  female patient, who presents for a 6 minute walk test ordered by MD Isis.  The diagnosis is Pulmonary Hypertension.  The patient's BMI is 35.1 kg/m2.  Predicted distance (lower limit of normal) is 268.37 meters.      Test Results:    The test was completed with stops.  The patient stopped 1 times for a total of 58 seconds.  The total time walked was 302 seconds.  During walking, the patient reported:  Dyspnea. The patient used a walker and supplemental oxygen during testing.     04/15/2019---------Distance: 267.31 meters (877 feet)     O2 Sat % Supplemental Oxygen Heart Rate Blood Pressure Matilda Scale   Pre-exercise  (Resting) 95 % 2 L/M 80 bpm 117/57 mmHg 3   During Exercise 86 % 2 L/M 98 bpm 128/60 mmHg 3   Post-exercise  (Recovery) 94 % 2 L/M  89 bpm   mmHg       Recovery Time: 120 seconds    Performing nurse/tech: [employer] RRT      PREVIOUS STUDY:   The patient had a previous study.  1/31/2019--------Distance: 182.88 meters (600 feet)       O2 Sat % Supplemental Oxygen Heart Rate Blood Pressure Matilda Scale   Pre-exercise  (Resting) 95 % 4 L/M 89 bpm 108/54 mmHg 2   During Exercise 88 % 4 L/M 102 bpm 129/60 mmHg 4   Post-exercise  (Recovery) 94 % 4 L/M  96 bpm   mmHg        CLINICAL INTERPRETATION:  Six minute walk distance is 267.31 meters (877 feet) with moderate dyspnea.  During exercise, there was significant desaturation while breathing supplemental oxygen.  Both blood pressure and heart rate remained stable with walking.  The patient did not report non-pulmonary symptoms during exercise.  The patient did complete the study, walking 302 seconds of the 360 second test.  Since the previous study in January 2019, exercise capacity is significantly improved.  Based upon age and body mass index, exercise capacity is normal.

## 2019-04-15 NOTE — PROGRESS NOTES
Subjective:     HPI:    68 y.o. AAF with mild obstructive lung disease, severe pulmonary hypertension (WHO group 1), moderate to severe RV dysfunction, chronic hypercapnic respiratory failure, , obesity, active tobacco dependence, GLENN on CPAP, and diabetes who comes today for a regular F/U. She  underwent a RHC that showed severe PH with normal PCWP. After which she was started on Tyvaso. Now on tyvaso 12 breaths 4x/day, opsumit and adempas 2.0 tid       Since last visit, pt says she's been doing pretty good except for her sinuses- using afrin and nasonex (alternates so she doesn't get nosebleed) has not been retaining fluid, no swelling. Says she's been walking more than she usually does- can go about 0.5 block before she has to stop and catch her breath, then continues on. No CP, says if it not for her allergies she would be alright.  Able to do everything she wants to do but at times has to stop and breath and then can keep going.  Does not get SOB in shower or getting dressed, does her own grocery shopping (walks around the store, does not ride) bumex is 1mg bid with good UOP  No LH.     Did increase her tyvaso to 12 breaths and has noticed further improvement in her SOB    No PND or orthopnea.      Lives alone but her niece lives close by and she and her son keep an eye on her    + constipation, no SE from the tyvaso or adempas     no 6mw today (will do it after her visit)   183m (Feb  282m ( 335 m in March 2017   )         Says she could have gone farther had it been cooler over there                                     O2 sat  95 -> 88 %  ->     94%    On 4L                                                HR  89-> 102                                                                  BP   108/54  -> 129/ 60                                                        Matilda  2   -> 4    TTE today  · Normal left ventricular systolic function. The estimated ejection fraction is 60%  · Mild right ventricular  enlargement.  · Low normal right ventricular systolic function. TAPSE 2.2  · Normal LV diastolic function.  · Moderate right atrial enlargement.  · The estimated PA systolic pressure is 92 mm Hg  · Elevated central venous pressure (15 mm Hg).  ·     RHC 9/18  RA: 16/16 (14)  RV: 65/4  RVEDP: 11   PW: 15/15 (16)  PA: 60/19 (37)  AO: 105/66 (79)  AO_SAT: 100  PA_SAT: 72    CONDITION 1 (9/17/2018 11:28:21):  BP: 110/65  HR: 78  FICKCI: 2.9400  FICKCO: 6.0500  PVR: 331.0000      TTE 2/9/18  The right ventricle is upper limit of normal measuring 4.2 cm at the base in the apical right ventricle-focused view. Global right ventricular systolic function appears low normal. Tricuspid annular plane systolic excursion (TAPSE) is   2.1 cm. Tissue Doppler-derived tricuspid annular peak systolic velocity (S prime) is 14.0 cm/s. The estimated PA systolic pressure is 53 mmHg    1 - Normal left ventricular systolic function (EF 60-65%).     2 - Wall motion abnormalities.     3 - Biatrial enlargement.     4 - Impaired LV relaxation, elevated LAP (grade 2 diastolic dysfunction).     5 - Right ventricle is upper limit of normal in size with low normal systolic function.     6 - Trivial tricuspid regurgitation.     7 - Trivial pericardial effusion.     8 - Pulmonary hypertension. The estimated PA systolic pressure is 53 mmHg.     RHC done 9/2016  AOPRES: 131/84 (100)  AOSAT: 97  FICKCI: 2.46  FICKCO: 4.99  PAPRES: 109/29 (61)  PASAT: 69  PVR: 10.01  PWPRES: 13/14 (14)  RAPRES: 12/11 (10)  RVPRES: 94/6, 14        Past Medical History:   Diagnosis Date    Asthma     COPD (chronic obstructive pulmonary disease)     Diastolic dysfunction with heart failure 5/20/2015    Occl RCA, high grade prox LAD and LCfx cath [facility] Dec 2014 5/11/2015    Pulmonary HTN     Sleep apnea      Past Surgical History:   Procedure Laterality Date    BACK SURGERY      HEART CATH-RIGHT Right 9/17/2018    [facility] by Le [facility]on [facility] at [facility]ATH LAB     "HYSTERECTOMY       OB History    None       Review of Systems   Constitution: Negative. Negative for chills, decreased appetite, diaphoresis, fever, malaise/fatigue, night sweats, weight gain and weight loss.   Eyes: Negative.    Cardiovascular: Positive for dyspnea on exertion. Negative for chest pain, claudication, cyanosis, irregular heartbeat, leg swelling, near-syncope, orthopnea, palpitations, paroxysmal nocturnal dyspnea and syncope.   Respiratory: Negative for cough, hemoptysis and shortness of breath.    Endocrine: Negative.    Hematologic/Lymphatic: Negative.    Skin: Negative for color change, dry skin and nail changes.   Musculoskeletal: Positive for back pain.   Gastrointestinal: Positive for constipation.   Genitourinary: Negative.    Neurological: Negative for weakness.       Objective:   Blood pressure (!) 107/52, pulse 80, height 5' 6" (1.676 m), weight 98.6 kg (217 lb 6 oz), SpO2 99 %.body mass index is 35.09 kg/m².  Physical Exam   Constitutional: She is oriented to person, place, and time. Vital signs are normal. She appears well-developed and well-nourished.   HENT:   Head: Normocephalic.   Eyes: Pupils are equal, round, and reactive to light.   Neck: Neck supple. No JVD present.   Cardiovascular: Normal rate, regular rhythm, normal heart sounds and intact distal pulses. PMI is displaced. Exam reveals no gallop and no friction rub.   No murmur heard.  Accentuated S2   Pulmonary/Chest: Effort normal and breath sounds normal. No respiratory distress. She has no wheezes. She has no rales.   Abdominal: Soft. Bowel sounds are normal. She exhibits no distension. There is no tenderness. There is no rebound.   Musculoskeletal: She exhibits no edema.   Neurological: She is alert and oriented to person, place, and time.   Nursing note and vitals reviewed.      Labs:    Chemistry        Component Value Date/Time     04/15/2019 0915    K 4.4 04/15/2019 0915     04/15/2019 0915    CO2 29 " 04/15/2019 0915    BUN 21 04/15/2019 0915    CREATININE 1.2 04/15/2019 0915     04/15/2019 0915        Component Value Date/Time    CALCIUM 9.9 04/15/2019 0915    ALKPHOS 66 04/15/2019 0915    AST 16 04/15/2019 0915    ALT 13 04/15/2019 0915    BILITOT 0.5 04/15/2019 0915          Magnesium   Date Value Ref Range Status   04/15/2019 2.0 1.6 - 2.6 mg/dL Final     Lab Results   Component Value Date    WBC 5.52 04/15/2019    HGB 11.8 (L) 04/15/2019    HCT 38.1 04/15/2019     04/15/2019     Lab Results   Component Value Date    INR 1.0 09/07/2016    INR 1.1 05/11/2015    INR 1.1 05/10/2015     BNP   Date Value Ref Range Status   04/15/2019 13 0 - 99 pg/mL Final     Comment:     Values of less than 100 pg/ml are consistent with non-CHF populations.   01/31/2019 25 0 - 99 pg/mL Final     Comment:     Values of less than 100 pg/ml are consistent with non-CHF populations.   09/17/2018 16 0 - 99 pg/mL Final     Comment:     Values of less than 100 pg/ml are consistent with non-CHF populations.     LD   Date Value Ref Range Status   05/04/2015 380 (H) 110 - 260 U/L Final     Comment:     Results are increased in hemolyzed samples.   05/01/2015 301 (H) 110 - 260 U/L Final     Comment:     Results are increased in hemolyzed samples.     No results found for this or any previous visit.  No results found for this or any previous visit.    Assessment:      1. Pulmonary hypertension- SEVERE on RHC with normal PCW, her COPD is moderate by PFTs, but on her imaging she has only mild centrilobular emphysema (much more prominent are   her enlarged PA, RV and RA her COPD is moderate by PFTs, but on her imaging she has only mild centrilobular emphysema (much more prominent are her enlarged PA, RV and RA)  Today, BNP remains normal,  euvolemic on exam, that said, PAP higher on todays echo, though RV fxn remains low normal.    2. RVF (right ventricular failure)    3. Chronic pulmonary heart disease    4. Chronic diastolic  heart failure    5. Other emphysema    6. Coronary artery disease involving native coronary artery of native heart without angina pectoris    7. TREVER (acute kidney injury)    8. Chronic respiratory failure with hypoxia    9. Class 2 severe obesity due to excess calories with serious comorbidity and body mass index (BMI) of 35.0 to 35.9 in adult      WHO group 1, FC II    Plan:   No medicine changes today (remains on tyvaso 12 br QID, adempas and opsummit)  Will increase to 2,5mg adempas in next couple weeks.    Cont O2 ATC    Today we talked about options should her RHC next visit look worse- I think given that she lives alone next drug transition should be tyvaso->uptravi- will await hermodynamics    Keep salt intake to under 2000 mg sodium, fluids to under 2 L (64 oz)    Should call us for increased SOB, swelling or wt changes    F/u 2-3 mo with RHC

## 2019-04-24 ENCOUNTER — TELEPHONE (OUTPATIENT)
Dept: TRANSPLANT | Facility: CLINIC | Age: 69
End: 2019-04-24

## 2019-04-24 NOTE — TELEPHONE ENCOUNTER
Message also sent ot Sobeida today, regarding where things were w/pt's screening for bridge, as pt called yesterday, concerned about running out of Opsumit.      From: Katerin Amaro   Sent: Monday, April 22, 2019 12:06 PM  To: Sobeida Castillo <[email or web address]>  Subject: RE: [id number]    Actually, cancel this, please. I have the form and will send it again, in case it wasnt received.  Thanks!    From: Katerin Amaro   Sent: Monday, April 22, 2019 12:06 PM  To: Sobeida Castillo <[email or web address]>  Subject: [id number]    Hi- I received a fax that [id number] is being screened for opsumit bridge, and needs an attestation form signed by physician. However, no form was attached. Could somebody please fax again to [phone]?  Thanks in advance!  Katerin

## 2019-04-24 NOTE — TELEPHONE ENCOUNTER
"From Sobeida, at [facility]. "I just took at look at this and I do see that we received the Therapy attestation form that you sent over. Right now all we're waiting on is the patients completed PAP application. We tried calling them yesterday to ask if they had received/completed/sent the form back because we haven't received it yet however, we couldn't get a hold of them and their wasn't an option to leave a voicemail. Do you have any sort of updated number for the patient? "    ----------    Phone numbers provided for pt. This RN also contacted pt and left VM regarding need to return patient assistance form.     "

## 2019-04-26 ENCOUNTER — TELEPHONE (OUTPATIENT)
Dept: TRANSPLANT | Facility: CLINIC | Age: 69
End: 2019-04-26

## 2019-04-26 NOTE — TELEPHONE ENCOUNTER
From [place], regarding pt's Tyvaso:        Hi ladies  I wanted to let you know that we are trying to reach the patient to discuss her assistance options with her.  We did check with her insurance and she has less than $300 left to meet her total out of pocket for the year before her claims pay at 100%.      Karie Marie  Lead Provider Support Advocate - [facility]  [employer] [employer]   [address]    [place] [address]  t [phone]    f [phone]     f  [phone]   [email or web address]

## 2019-06-28 ENCOUNTER — TELEPHONE (OUTPATIENT)
Dept: TRANSPLANT | Facility: CLINIC | Age: 69
End: 2019-06-28

## 2019-06-28 NOTE — TELEPHONE ENCOUNTER
Returned call to [facility] They requested tracking number or CPT. Replied that this information is unknown, because it was uncertain why I was even calling, and that I was merely returning a call.

## 2019-06-28 NOTE — TELEPHONE ENCOUNTER
----- Message from Piper Evans sent at 6/28/2019 12:45 PM CDT -----  Contact: [facility] [employer] [facility]-[phone]  They are calling to get more info for the PA. Please call they back @ [phone]. Thanks, Piper

## 2019-07-08 ENCOUNTER — HOSPITAL ENCOUNTER (OUTPATIENT)
Facility: HOSPITAL | Age: 69
Discharge: HOME | End: 2019-07-08
Attending: INTERNAL MEDICINE | Admitting: INTERNAL MEDICINE
Payer: MEDICARE

## 2019-07-08 DIAGNOSIS — I50.32 CHRONIC DIASTOLIC HEART FAILURE: ICD-10-CM

## 2019-07-08 DIAGNOSIS — I27.20 PULMONARY HYPERTENSION: ICD-10-CM

## 2019-07-08 PROCEDURE — 93451 RIGHT HEART CATH: CPT | Performed by: INTERNAL MEDICINE

## 2019-07-08 PROCEDURE — C1751 CATH, INF, PER/CENT/MIDLINE: HCPCS | Performed by: INTERNAL MEDICINE

## 2019-07-08 PROCEDURE — 93451 PR RIGHT HEART CATH O2 SATURATION & CARDIAC OUTPUT: ICD-10-PCS | Mod: 26,,, | Performed by: INTERNAL MEDICINE

## 2019-07-08 PROCEDURE — 93451 RIGHT HEART CATH: CPT | Mod: 26,,, | Performed by: INTERNAL MEDICINE

## 2019-07-08 PROCEDURE — C1894 INTRO/SHEATH, NON-LASER: HCPCS | Performed by: INTERNAL MEDICINE

## 2019-07-08 PROCEDURE — 25000003 PHARM REV CODE 250: Performed by: INTERNAL MEDICINE

## 2019-07-08 RX ORDER — LIDOCAINE HYDROCHLORIDE 20 MG/ML
INJECTION, SOLUTION EPIDURAL; INFILTRATION; INTRACAUDAL; PERINEURAL
Status: DISCONTINUED | OUTPATIENT
Start: 2019-07-08 | End: 2019-07-09 | Stop reason: HOSPADM

## 2019-07-08 NOTE — H&P
68 y.o. AAF with mild obstructive lung disease, severe pulmonary hypertension (WHO group 1), moderate to severe RV dysfunction, chronic hypercapnic respiratory failure, , obesity, active tobacco dependence, GLENN on CPAP, and diabetes who comes today for a regular F/U. She  underwent a RHC that showed severe PH with normal PCWP. After which she was started on Tyvaso. Now on tyvaso 12 breaths 4x/day, opsumit and adempas 2.0 tid        here for RHC to reassess PAP.          Lives alone but her niece lives close by and she and her son keep an eye on her     + constipation, no SE from the tyvaso or adempas      no 6mw today (will do it after her visit)   183m (Feb  282m ( 335 m in March 2017   )         Says she could have gone farther had it been cooler over there                                     O2 sat  95 -> 88 %  ->     94%    On 4L                                                HR  89-> 102                                                                  BP   108/54  -> 129/ 60                                                        Matilda  2   -> 4     TTE today  · Normal left ventricular systolic function. The estimated ejection fraction is 60%  · Mild right ventricular enlargement.  · Low normal right ventricular systolic function. TAPSE 2.2  · Normal LV diastolic function.  · Moderate right atrial enlargement.  · The estimated PA systolic pressure is 92 mm Hg  · Elevated central venous pressure (15 mm Hg).  ·       RHC 9/18  RA: 16/16 (14)  RV: 65/4  RVEDP: 11   PW: 15/15 (16)  PA: 60/19 (37)  AO: 105/66 (79)  AO_SAT: 100  PA_SAT: 72    CONDITION 1 (9/17/2018 11:28:21):  BP: 110/65  HR: 78  FICKCI: 2.9400  FICKCO: 6.0500  PVR: 331.0000        TTE 2/9/18  The right ventricle is upper limit of normal measuring 4.2 cm at the base in the apical right ventricle-focused view. Global right ventricular systolic function appears low normal. Tricuspid annular plane systolic excursion (TAPSE) is   2.1 cm. Tissue  Doppler-derived tricuspid annular peak systolic velocity (S prime) is 14.0 cm/s. The estimated PA systolic pressure is 53 mmHg    1 - Normal left ventricular systolic function (EF 60-65%).     2 - Wall motion abnormalities.     3 - Biatrial enlargement.     4 - Impaired LV relaxation, elevated LAP (grade 2 diastolic dysfunction).     5 - Right ventricle is upper limit of normal in size with low normal systolic function.     6 - Trivial tricuspid regurgitation.     7 - Trivial pericardial effusion.     8 - Pulmonary hypertension. The estimated PA systolic pressure is 53 mmHg.     RHC done 9/2016  AOPRES: 131/84 (100)  AOSAT: 97  FICKCI: 2.46  FICKCO: 4.99  PAPRES: 109/29 (61)  PASAT: 69  PVR: 10.01  PWPRES: 13/14 (14)  RAPRES: 12/11 (10)  RVPRES: 94/6, 14                Past Medical History:   Diagnosis Date    Asthma      COPD (chronic obstructive pulmonary disease)      Diastolic dysfunction with heart failure 5/20/2015    Occl RCA, high grade prox LAD and LCfx cath [facility] Dec 2014 5/11/2015    Pulmonary HTN      Sleep apnea              Past Surgical History:   Procedure Laterality Date    BACK SURGERY        HEART CATH-RIGHT Right 9/17/2018     Performed by Le Salmeron MD at [facility] CATH [facility]    HYSTERECTOMY          OB History    None         Review of Systems   Constitution: Negative. Negative for chills, decreased appetite, diaphoresis, fever, malaise/fatigue, night sweats, weight gain and weight loss.   Eyes: Negative.    Cardiovascular: Positive for dyspnea on exertion. Negative for chest pain, claudication, cyanosis, irregular heartbeat, leg swelling, near-syncope, orthopnea, palpitations, paroxysmal nocturnal dyspnea and syncope.   Respiratory: Negative for cough, hemoptysis and shortness of breath.    Endocrine: Negative.    Hematologic/Lymphatic: Negative.    Skin: Negative for color change, dry skin and nail changes.   Musculoskeletal: Positive for back pain.   Gastrointestinal: Positive for  constipation.   Genitourinary: Negative.    Neurological: Negative for weakness.         Objective:     Physical Exam   Constitutional: She is oriented to person, place, and time. Vital signs are normal. She appears well-developed and well-nourished.   HENT:   Head: Normocephalic.   Eyes: Pupils are equal, round, and reactive to light.   Neck: Neck supple. No JVD present.   Cardiovascular: Normal rate, regular rhythm, normal heart sounds and intact distal pulses. PMI is displaced. Exam reveals no gallop and no friction rub.   No murmur heard.  Accentuated S2   Pulmonary/Chest: Effort normal and breath sounds normal. No respiratory distress. She has no wheezes. She has no rales.   Abdominal: Soft. Bowel sounds are normal. She exhibits no distension. There is no tenderness. There is no rebound.   Musculoskeletal: She exhibits no edema.   Neurological: She is alert and oriented to person, place, and time.   Nursing note and vitals reviewed.        Labs:  Lab Results   Component Value Date    BNP 13 04/15/2019     04/15/2019    K 4.4 04/15/2019    MG 2.0 04/15/2019     04/15/2019    CO2 29 04/15/2019    BUN 21 04/15/2019    CREATININE 1.2 04/15/2019     04/15/2019    HGBA1C 6.1 05/02/2015    AST 16 04/15/2019    ALT 13 04/15/2019    ALBUMIN 3.9 04/15/2019    PROT 7.1 04/15/2019    BILITOT 0.5 04/15/2019       Magnesium   Date Value Ref Range Status   04/15/2019 2.0 1.6 - 2.6 mg/dL Final       Lab Results   Component Value Date    WBC 5.64 07/08/2019    HGB 11.5 (L) 07/08/2019    HCT 37.6 07/08/2019    MCV 89 07/08/2019     07/08/2019       Lab Results   Component Value Date    INR 1.0 09/07/2016    INR 1.1 05/11/2015    INR 1.1 05/10/2015       BNP   Date Value Ref Range Status   04/15/2019 13 0 - 99 pg/mL Final     Comment:     Values of less than 100 pg/ml are consistent with non-CHF populations.   01/31/2019 25 0 - 99 pg/mL Final     Comment:     Values of less than 100 pg/ml are consistent  with non-CHF populations.   09/17/2018 16 0 - 99 pg/mL Final     Comment:     Values of less than 100 pg/ml are consistent with non-CHF populations.       LD   Date Value Ref Range Status   05/04/2015 380 (H) 110 - 260 U/L Final     Comment:     Results are increased in hemolyzed samples.   05/01/2015 301 (H) 110 - 260 U/L Final     Comment:     Results are increased in hemolyzed samples.            Assessment:      1. Pulmonary hypertension- SEVERE on RHC with normal PCW, her COPD is moderate by PFTs, but on her imaging she has only mild centrilobular emphysema (much more prominent are   her enlarged PA, RV and RA her COPD is moderate by PFTs, but on her imaging she has only mild centrilobular emphysema (much more prominent are her enlarged PA, RV and RA)   BNP remains normal,  euvolemic on exam, that said, PAP higher on echo, though RV fxn remains low normal.    2. RVF (right ventricular failure)    3. Chronic pulmonary heart disease    4. Chronic diastolic heart failure    5. Other emphysema    6. Coronary artery disease involving native coronary artery of native heart without angina pectoris    7. TREVER (acute kidney injury)    8. Chronic respiratory failure with hypoxia    9. Class 2 severe obesity due to excess calories with serious comorbidity and body mass index (BMI) of 35.0 to 35.9 in adult       WHO group 1, FC II     Plan:      RHC R IJ, 7 Fr sheath with local lidocaine, micropuncture kit and US guidance.    I have explained the risks, benefits and alternatives of the procedure in detail. The patient voices understanding and all questions have been answered,. The patient agrees to proceed as planned.

## 2019-07-08 NOTE — DISCHARGE INSTRUCTIONS

## 2019-07-08 NOTE — Clinical Note
The PA catheter is repositioned to the right pulmonary artery. Hemodynamics performed. Oxygen saturation obtained at 69%.

## 2019-07-08 NOTE — DISCHARGE SUMMARY
Date of admit to cath lab: 7/8/2019      Date of discharge from cath lab: 7/8/2019      Principal diagnosis: PH    Discharge attending physician: Le Salmeron MD    Hospital Course/Outcome of the treatment, procedures or surgery: Pt admitted for RHC.   See CVIS/cath lab procedure report in EPIC  for full report of today's procedure.    Disposition of the case (d/c disposition): home    Discharge Medication List: see med card    Plan for follow up care, diet, activity level: F/U as scheduled. Resume low Na diet.  Activity as tolerated    Condition on discharge from Cath lab: Stable.

## 2019-07-09 ENCOUNTER — TELEPHONE (OUTPATIENT)
Dept: TRANSPLANT | Facility: CLINIC | Age: 69
End: 2019-07-09

## 2019-07-10 ENCOUNTER — TELEPHONE (OUTPATIENT)
Dept: TRANSPLANT | Facility: CLINIC | Age: 69
End: 2019-07-10

## 2019-07-15 ENCOUNTER — TELEPHONE (OUTPATIENT)
Dept: TRANSPLANT | Facility: CLINIC | Age: 69
End: 2019-07-15

## 2019-07-17 ENCOUNTER — TELEPHONE (OUTPATIENT)
Dept: TRANSPLANT | Facility: CLINIC | Age: 69
End: 2019-07-17

## 2019-07-24 ENCOUNTER — TELEPHONE (OUTPATIENT)
Dept: TRANSPLANT | Facility: CLINIC | Age: 69
End: 2019-07-24

## 2019-07-24 NOTE — TELEPHONE ENCOUNTER
----- Message from Shania Linton sent at 7/24/2019  3:19 PM CDT -----  Contact: Self  .Needs Advice    Reason for call: If can call Pt regarding her med- Uptravi says her co-pay is $1346. thanks        Communication Preference: [phone]    Additional Information:

## 2019-07-25 ENCOUNTER — TELEPHONE (OUTPATIENT)
Dept: RESEARCH | Facility: HOSPITAL | Age: 69
End: 2019-07-25

## 2019-07-31 ENCOUNTER — RESEARCH ENCOUNTER (OUTPATIENT)
Dept: RESEARCH | Facility: HOSPITAL | Age: 69
End: 2019-07-31

## 2019-07-31 NOTE — PROGRESS NOTES
Date Consent signed: 07/31/2019    Sponsor: [employer]    Study Title/IRB Number: Formerly named [facility] [id number]    Principle Investigator: Le Salmeron MD    Present for Discussion: Reji Dillon DeAnna Ames     Is LAR Consenting for Subject: No    Prior to the Informed Consent (IC) being signed, or any study protocol required data collection, testing, procedure, or intervention being performed, the following was done and/or discussed:   Patient was given a copy of the IC for review    Purpose of the study and qualifications to participate    Study design, Follow up schedule, and tests or procedures done at each visit   Confidentiality and HIPAA Authorization for Release of Medical Records for the research trial/ subject's rights/research related injury   Risk, Benefits, Alternative Treatments, Compensation and Costs   Participation in the research trial is voluntary and patient may withdraw at anytime   Contact information for study related questions    Patient verbalizes understanding of the above: Yes  Contact information for CRC and PI given to patient: Yes  Patient able to adequately summarize: the purpose of the study, the risks associated with the study, and all procedures, testing, and follow-ups associated with the study: Yes    Bessie Davis signed the informed consent form for the [employer] research study with an IRB approval date of 05/16/2018.  Each page of the consent form was reviewed with Bessie Davis (and pts family) and all questions answered satisfactorily. Bessie Davis signed the consent form and received a copy of same. The original consent was scanned into electronic medical records (EPIC) and filed into the subject's research study binder.

## 2019-08-01 ENCOUNTER — TELEPHONE (OUTPATIENT)
Dept: TRANSPLANT | Facility: CLINIC | Age: 69
End: 2019-08-01

## 2019-08-01 NOTE — TELEPHONE ENCOUNTER
----- Message from Henrietta Saldana MA sent at 8/1/2019  2:42 PM CDT -----  Contact: patient called   The patient said someone called her home. Please call back at [phone]. Thank you.

## 2019-08-01 NOTE — TELEPHONE ENCOUNTER
Spoke w/pt, who says she is receptive to speaking w/[facility]. Asked pt to call and also asked [facility] to call again.

## 2019-08-16 ENCOUNTER — TELEPHONE (OUTPATIENT)
Dept: TRANSPLANT | Facility: CLINIC | Age: 69
End: 2019-08-16

## 2019-08-16 NOTE — TELEPHONE ENCOUNTER
"Pt reports she feels like she is "breathing better" on Uptravi 400mcg PO BID. No c/o SE, other than HA, but she said it is tolerable. She will continue to titrate weekly, as tolerable, and call me w/any concerns.  "

## 2019-09-10 ENCOUNTER — TELEPHONE (OUTPATIENT)
Dept: TRANSPLANT | Facility: CLINIC | Age: 69
End: 2019-09-10

## 2019-09-10 NOTE — TELEPHONE ENCOUNTER
Spoke w/pt. She is having difficulty renewing her PAF jumana. Notified [facility], and requested somebody contact pt to assist.

## 2019-09-10 NOTE — TELEPHONE ENCOUNTER
----- Message from Shania Lintno sent at 9/10/2019 10:16 AM CDT -----  Contact: Self- [phone]  Pt need assistance on applying for RX assistance doesn't have computer to complete the application. Thanks    [phone]

## 2019-10-02 ENCOUNTER — TELEPHONE (OUTPATIENT)
Dept: TRANSPLANT | Facility: CLINIC | Age: 69
End: 2019-10-02

## 2019-11-30 ENCOUNTER — HOSPITAL ENCOUNTER (INPATIENT)
Facility: HOSPITAL | Age: 69
LOS: 4 days | Discharge: HOME | DRG: 389 | End: 2019-12-04
Attending: EMERGENCY MEDICINE | Admitting: SURGERY
Payer: MEDICARE

## 2019-11-30 DIAGNOSIS — N17.9 AKI (ACUTE KIDNEY INJURY): ICD-10-CM

## 2019-11-30 DIAGNOSIS — R10.9 ABDOMINAL PAIN, UNSPECIFIED ABDOMINAL LOCATION: Primary | ICD-10-CM

## 2019-11-30 DIAGNOSIS — Z46.59 ENCOUNTER FOR NASOGASTRIC (NG) TUBE PLACEMENT: ICD-10-CM

## 2019-11-30 DIAGNOSIS — K56.609 SBO (SMALL BOWEL OBSTRUCTION): ICD-10-CM

## 2019-11-30 DIAGNOSIS — I27.20 PULMONARY HYPERTENSION: ICD-10-CM

## 2019-11-30 DIAGNOSIS — R10.13 EPIGASTRIC ABDOMINAL PAIN: ICD-10-CM

## 2019-11-30 DIAGNOSIS — R11.2 NON-INTRACTABLE VOMITING WITH NAUSEA, UNSPECIFIED VOMITING TYPE: ICD-10-CM

## 2019-11-30 LAB
ALBUMIN SERPL BCP-MCNC: 4.2 G/DL (ref 3.5–5.2)
ALP SERPL-CCNC: 64 U/L (ref 55–135)
ALT SERPL W/O P-5'-P-CCNC: 17 U/L (ref 10–44)
ANION GAP SERPL CALC-SCNC: 10 MMOL/L (ref 8–16)
AST SERPL-CCNC: 14 U/L (ref 10–40)
BACTERIA #/AREA URNS AUTO: ABNORMAL /HPF
BASOPHILS # BLD AUTO: 0.04 K/UL (ref 0–0.2)
BASOPHILS NFR BLD: 0.7 % (ref 0–1.9)
BILIRUB SERPL-MCNC: 0.6 MG/DL (ref 0.1–1)
BILIRUB UR QL STRIP: NEGATIVE
BNP SERPL-MCNC: 20 PG/ML (ref 0–99)
BUN SERPL-MCNC: 23 MG/DL (ref 8–23)
CALCIUM SERPL-MCNC: 9.8 MG/DL (ref 8.7–10.5)
CHLORIDE SERPL-SCNC: 104 MMOL/L (ref 95–110)
CLARITY UR REFRACT.AUTO: CLEAR
CO2 SERPL-SCNC: 25 MMOL/L (ref 23–29)
COLOR UR AUTO: ABNORMAL
CREAT SERPL-MCNC: 2.1 MG/DL (ref 0.5–1.4)
DIFFERENTIAL METHOD: ABNORMAL
EOSINOPHIL # BLD AUTO: 0 K/UL (ref 0–0.5)
EOSINOPHIL NFR BLD: 0.5 % (ref 0–8)
ERYTHROCYTE [DISTWIDTH] IN BLOOD BY AUTOMATED COUNT: 19.1 % (ref 11.5–14.5)
EST. GFR  (AFRICAN AMERICAN): 27.3 ML/MIN/1.73 M^2
EST. GFR  (NON AFRICAN AMERICAN): 23.7 ML/MIN/1.73 M^2
GLUCOSE SERPL-MCNC: 135 MG/DL (ref 70–110)
GLUCOSE UR QL STRIP: NEGATIVE
HCT VFR BLD AUTO: 35.8 % (ref 37–48.5)
HGB BLD-MCNC: 11.2 G/DL (ref 12–16)
HGB UR QL STRIP: ABNORMAL
HYALINE CASTS UR QL AUTO: 3 /LPF
IMM GRANULOCYTES # BLD AUTO: 0.03 K/UL (ref 0–0.04)
IMM GRANULOCYTES NFR BLD AUTO: 0.5 % (ref 0–0.5)
KETONES UR QL STRIP: NEGATIVE
LACTATE SERPL-SCNC: 0.8 MMOL/L (ref 0.5–2.2)
LEUKOCYTE ESTERASE UR QL STRIP: NEGATIVE
LIPASE SERPL-CCNC: 42 U/L (ref 4–60)
LYMPHOCYTES # BLD AUTO: 0.7 K/UL (ref 1–4.8)
LYMPHOCYTES NFR BLD: 11.4 % (ref 18–48)
MCH RBC QN AUTO: 26.6 PG (ref 27–31)
MCHC RBC AUTO-ENTMCNC: 31.3 G/DL (ref 32–36)
MCV RBC AUTO: 85 FL (ref 82–98)
MICROSCOPIC COMMENT: ABNORMAL
MONOCYTES # BLD AUTO: 0.4 K/UL (ref 0.3–1)
MONOCYTES NFR BLD: 5.7 % (ref 4–15)
NEUTROPHILS # BLD AUTO: 5 K/UL (ref 1.8–7.7)
NEUTROPHILS NFR BLD: 81.2 % (ref 38–73)
NITRITE UR QL STRIP: NEGATIVE
NRBC BLD-RTO: 0 /100 WBC
PH UR STRIP: 5 [PH] (ref 5–8)
PLATELET # BLD AUTO: 236 K/UL (ref 150–350)
PMV BLD AUTO: 9.5 FL (ref 9.2–12.9)
POCT GLUCOSE: 104 MG/DL (ref 70–110)
POTASSIUM SERPL-SCNC: 4.8 MMOL/L (ref 3.5–5.1)
PROT SERPL-MCNC: 7.7 G/DL (ref 6–8.4)
PROT UR QL STRIP: NEGATIVE
RBC # BLD AUTO: 4.21 M/UL (ref 4–5.4)
RBC #/AREA URNS AUTO: 1 /HPF (ref 0–4)
SODIUM SERPL-SCNC: 139 MMOL/L (ref 136–145)
SP GR UR STRIP: 1 (ref 1–1.03)
SQUAMOUS #/AREA URNS AUTO: 1 /HPF
TROPONIN I SERPL DL<=0.01 NG/ML-MCNC: 0.01 NG/ML (ref 0–0.03)
URN SPEC COLLECT METH UR: ABNORMAL
WBC # BLD AUTO: 6.14 K/UL (ref 3.9–12.7)
WBC #/AREA URNS AUTO: 0 /HPF (ref 0–5)

## 2019-11-30 PROCEDURE — 27000221 HC OXYGEN, UP TO 24 HOURS

## 2019-11-30 PROCEDURE — 80053 COMPREHEN METABOLIC PANEL: CPT

## 2019-11-30 PROCEDURE — 83880 ASSAY OF NATRIURETIC PEPTIDE: CPT

## 2019-11-30 PROCEDURE — G0378 HOSPITAL OBSERVATION PER HR: HCPCS

## 2019-11-30 PROCEDURE — 96374 THER/PROPH/DIAG INJ IV PUSH: CPT

## 2019-11-30 PROCEDURE — 99285 EMERGENCY DEPT VISIT HI MDM: CPT | Mod: ,,, | Performed by: EMERGENCY MEDICINE

## 2019-11-30 PROCEDURE — 63600175 PHARM REV CODE 636 W HCPCS: Performed by: PHYSICIAN ASSISTANT

## 2019-11-30 PROCEDURE — 63600175 PHARM REV CODE 636 W HCPCS: Performed by: INTERNAL MEDICINE

## 2019-11-30 PROCEDURE — 84484 ASSAY OF TROPONIN QUANT: CPT

## 2019-11-30 PROCEDURE — 93010 ELECTROCARDIOGRAM REPORT: CPT | Mod: ,,, | Performed by: INTERNAL MEDICINE

## 2019-11-30 PROCEDURE — 96375 TX/PRO/DX INJ NEW DRUG ADDON: CPT

## 2019-11-30 PROCEDURE — 94761 N-INVAS EAR/PLS OXIMETRY MLT: CPT

## 2019-11-30 PROCEDURE — 82962 GLUCOSE BLOOD TEST: CPT

## 2019-11-30 PROCEDURE — 93010 EKG 12-LEAD: ICD-10-PCS | Mod: ,,, | Performed by: INTERNAL MEDICINE

## 2019-11-30 PROCEDURE — 93005 ELECTROCARDIOGRAM TRACING: CPT

## 2019-11-30 PROCEDURE — 96361 HYDRATE IV INFUSION ADD-ON: CPT

## 2019-11-30 PROCEDURE — 63600175 PHARM REV CODE 636 W HCPCS: Performed by: EMERGENCY MEDICINE

## 2019-11-30 PROCEDURE — 85025 COMPLETE CBC W/AUTO DIFF WBC: CPT

## 2019-11-30 PROCEDURE — 94640 AIRWAY INHALATION TREATMENT: CPT

## 2019-11-30 PROCEDURE — 99285 PR EMERGENCY DEPT VISIT,LEVEL V: ICD-10-PCS | Mod: ,,, | Performed by: EMERGENCY MEDICINE

## 2019-11-30 PROCEDURE — 96372 THER/PROPH/DIAG INJ SC/IM: CPT | Mod: 59 | Performed by: EMERGENCY MEDICINE

## 2019-11-30 PROCEDURE — 99222 1ST HOSP IP/OBS MODERATE 55: CPT | Mod: AI,,, | Performed by: SURGERY

## 2019-11-30 PROCEDURE — 63600175 PHARM REV CODE 636 W HCPCS: Performed by: STUDENT IN AN ORGANIZED HEALTH CARE EDUCATION/TRAINING PROGRAM

## 2019-11-30 PROCEDURE — 83690 ASSAY OF LIPASE: CPT

## 2019-11-30 PROCEDURE — 99222 PR INITIAL HOSPITAL CARE,LEVL II: ICD-10-PCS | Mod: AI,,, | Performed by: SURGERY

## 2019-11-30 PROCEDURE — 11000001 HC ACUTE MED/SURG PRIVATE ROOM

## 2019-11-30 PROCEDURE — 96361 HYDRATE IV INFUSION ADD-ON: CPT | Performed by: EMERGENCY MEDICINE

## 2019-11-30 PROCEDURE — 81001 URINALYSIS AUTO W/SCOPE: CPT

## 2019-11-30 PROCEDURE — 25000242 PHARM REV CODE 250 ALT 637 W/ HCPCS: Performed by: EMERGENCY MEDICINE

## 2019-11-30 PROCEDURE — 83605 ASSAY OF LACTIC ACID: CPT

## 2019-11-30 PROCEDURE — 99285 EMERGENCY DEPT VISIT HI MDM: CPT | Mod: 25

## 2019-11-30 RX ORDER — ONDANSETRON 8 MG/1
8 TABLET, ORALLY DISINTEGRATING ORAL EVERY 8 HOURS PRN
Status: DISCONTINUED | OUTPATIENT
Start: 2019-11-30 | End: 2019-12-04 | Stop reason: HOSPADM

## 2019-11-30 RX ORDER — KETOROLAC TROMETHAMINE 30 MG/ML
10 INJECTION, SOLUTION INTRAMUSCULAR; INTRAVENOUS
Status: COMPLETED | OUTPATIENT
Start: 2019-11-30 | End: 2019-11-30

## 2019-11-30 RX ORDER — SODIUM CHLORIDE 0.9 % (FLUSH) 0.9 %
10 SYRINGE (ML) INJECTION
Status: DISCONTINUED | OUTPATIENT
Start: 2019-11-30 | End: 2019-12-04 | Stop reason: HOSPADM

## 2019-11-30 RX ORDER — MORPHINE SULFATE 2 MG/ML
1 INJECTION, SOLUTION INTRAMUSCULAR; INTRAVENOUS EVERY 4 HOURS PRN
Status: DISCONTINUED | OUTPATIENT
Start: 2019-11-30 | End: 2019-12-01

## 2019-11-30 RX ORDER — BUDESONIDE AND FORMOTEROL FUMARATE DIHYDRATE 160; 4.5 UG/1; UG/1
2 AEROSOL RESPIRATORY (INHALATION) EVERY 12 HOURS
Status: DISCONTINUED | OUTPATIENT
Start: 2019-11-30 | End: 2019-12-04 | Stop reason: HOSPADM

## 2019-11-30 RX ORDER — GLUCAGON 1 MG
1 KIT INJECTION
Status: DISCONTINUED | OUTPATIENT
Start: 2019-11-30 | End: 2019-12-04 | Stop reason: HOSPADM

## 2019-11-30 RX ORDER — FLUTICASONE PROPIONATE 50 MCG
1 SPRAY, SUSPENSION (ML) NASAL DAILY
Status: DISCONTINUED | OUTPATIENT
Start: 2019-12-01 | End: 2019-12-04 | Stop reason: HOSPADM

## 2019-11-30 RX ORDER — IPRATROPIUM BROMIDE AND ALBUTEROL SULFATE 2.5; .5 MG/3ML; MG/3ML
3 SOLUTION RESPIRATORY (INHALATION)
Status: COMPLETED | OUTPATIENT
Start: 2019-11-30 | End: 2019-11-30

## 2019-11-30 RX ORDER — MORPHINE SULFATE 2 MG/ML
6 INJECTION, SOLUTION INTRAMUSCULAR; INTRAVENOUS
Status: DISCONTINUED | OUTPATIENT
Start: 2019-11-30 | End: 2019-11-30

## 2019-11-30 RX ORDER — METOCLOPRAMIDE HYDROCHLORIDE 5 MG/ML
10 INJECTION INTRAMUSCULAR; INTRAVENOUS
Status: COMPLETED | OUTPATIENT
Start: 2019-11-30 | End: 2019-11-30

## 2019-11-30 RX ORDER — MORPHINE SULFATE 2 MG/ML
2 INJECTION, SOLUTION INTRAMUSCULAR; INTRAVENOUS EVERY 4 HOURS PRN
Status: DISCONTINUED | OUTPATIENT
Start: 2019-11-30 | End: 2019-12-01

## 2019-11-30 RX ORDER — SODIUM CHLORIDE, SODIUM LACTATE, POTASSIUM CHLORIDE, CALCIUM CHLORIDE 600; 310; 30; 20 MG/100ML; MG/100ML; MG/100ML; MG/100ML
INJECTION, SOLUTION INTRAVENOUS CONTINUOUS
Status: DISCONTINUED | OUTPATIENT
Start: 2019-11-30 | End: 2019-12-02

## 2019-11-30 RX ORDER — ONDANSETRON 2 MG/ML
4 INJECTION INTRAMUSCULAR; INTRAVENOUS
Status: COMPLETED | OUTPATIENT
Start: 2019-11-30 | End: 2019-11-30

## 2019-11-30 RX ORDER — ACETAMINOPHEN 325 MG/1
650 TABLET ORAL EVERY 8 HOURS PRN
Status: DISCONTINUED | OUTPATIENT
Start: 2019-11-30 | End: 2019-12-04 | Stop reason: HOSPADM

## 2019-11-30 RX ORDER — HEPARIN SODIUM 5000 [USP'U]/ML
5000 INJECTION, SOLUTION INTRAVENOUS; SUBCUTANEOUS EVERY 8 HOURS
Status: DISCONTINUED | OUTPATIENT
Start: 2019-11-30 | End: 2019-12-04 | Stop reason: HOSPADM

## 2019-11-30 RX ORDER — INSULIN ASPART 100 [IU]/ML
1-10 INJECTION, SOLUTION INTRAVENOUS; SUBCUTANEOUS EVERY 6 HOURS PRN
Status: DISCONTINUED | OUTPATIENT
Start: 2019-11-30 | End: 2019-12-04 | Stop reason: HOSPADM

## 2019-11-30 RX ADMIN — SODIUM CHLORIDE, SODIUM LACTATE, POTASSIUM CHLORIDE, AND CALCIUM CHLORIDE: .6; .31; .03; .02 INJECTION, SOLUTION INTRAVENOUS at 10:11

## 2019-11-30 RX ADMIN — HEPARIN SODIUM 5000 UNITS: 5000 INJECTION, SOLUTION INTRAVENOUS; SUBCUTANEOUS at 10:11

## 2019-11-30 RX ADMIN — KETOROLAC TROMETHAMINE 10 MG: 30 INJECTION, SOLUTION INTRAMUSCULAR; INTRAVENOUS at 04:11

## 2019-11-30 RX ADMIN — IPRATROPIUM BROMIDE AND ALBUTEROL SULFATE 3 ML: .5; 3 SOLUTION RESPIRATORY (INHALATION) at 06:11

## 2019-11-30 RX ADMIN — ONDANSETRON 4 MG: 2 INJECTION INTRAMUSCULAR; INTRAVENOUS at 04:11

## 2019-11-30 RX ADMIN — SODIUM CHLORIDE 500 ML: 0.9 INJECTION, SOLUTION INTRAVENOUS at 04:11

## 2019-11-30 RX ADMIN — METOCLOPRAMIDE 10 MG: 5 INJECTION, SOLUTION INTRAMUSCULAR; INTRAVENOUS at 06:11

## 2019-11-30 NOTE — ED PROVIDER NOTES
Encounter Date: 11/30/2019       History     Chief Complaint   Patient presents with    Abdominal Pain     with nausea and vomiting     69-year-old  female with history of COPD, heart failure, pulmonary hypertension presents to the ED complaining of nausea, vomiting, and abdominal pain since yesterday.  Yesterday, her abdominal pain improved with vomiting but today her pain has been progressively worsening periumbilical 10/10 stabbing that has not been relieved with Vicodin.  She is able to tolerate some liquid but no solid foods.  She has chronic diarrhea but has not had a bowel movement since yesterday.  She states she is not passing gas.  She does report some chills and a cough, she is currently on amoxicillin for sinus infection.  She denies any sick contacts, recent travel.  Past surgical history significant for hysterectomy.  She denies fever, chest pain, dysuria, headache, lightheadedness.    The history is provided by the patient.     Review of patient's allergies indicates:   Allergen Reactions    Penicillins     Sulfa (sulfonamide antibiotics)      Past Medical History:   Diagnosis Date    Asthma     COPD (chronic obstructive pulmonary disease)     Diastolic dysfunction with heart failure 5/20/2015    Occl RCA, high grade prox LAD and LCfx cath [facility] Dec 2014 5/11/2015    Pulmonary HTN     Sleep apnea      Past Surgical History:   Procedure Laterality Date    BACK SURGERY      HYSTERECTOMY      RIGHT HEART CATHETERIZATION Right 9/17/2018    Procedure: HEART CATH-RIGHT;  Surgeon: Le Salmeron MD;  Location: [facility] CATH LAB;  Service: Cardiology;  Laterality: Right;    RIGHT HEART CATHETERIZATION Right 7/8/2019    Procedure: HEART CATH-RIGHT;  Surgeon: Le Salmeron MD;  Location: [facility] CATH LAB;  Service: Cardiology;  Laterality: Right;     Family History   Problem Relation Age of Onset    Cancer Sister      Social History     Tobacco Use    Smoking status: Former  Smoker     Packs/day: 0.25     Years: 15.00     Pack years: 3.75     Types: Cigarettes     Last attempt to quit: 11/2016     Years since quitting: 3.0    Smokeless tobacco: Never Used   Substance Use Topics    Alcohol use: No    Drug use: No     Review of Systems   Constitutional: Positive for chills. Negative for fever.   HENT: Negative for congestion, rhinorrhea and sore throat.    Eyes: Negative for photophobia and visual disturbance.   Respiratory: Positive for cough and shortness of breath (at baseline).    Cardiovascular: Negative for chest pain and leg swelling.   Gastrointestinal: Positive for abdominal pain, diarrhea (chronic), nausea and vomiting. Negative for constipation.        Last BM yesterday   Genitourinary: Negative for dysuria, hematuria and vaginal bleeding.   Musculoskeletal: Negative for neck pain and neck stiffness.   Skin: Negative for rash and wound.   Neurological: Negative for light-headedness, numbness and headaches.   Psychiatric/Behavioral: Negative for confusion.       Physical Exam     Initial Vitals [11/30/19 1544]   BP Pulse Resp Temp SpO2   138/61 88 15 98.2 °F (36.8 °C) (!) 93 %      MAP       --         Physical Exam    Nursing note and vitals reviewed.  Constitutional: She appears well-developed and well-nourished. She is not diaphoretic.   Appears uncomfortable secondary to pain   HENT:   Head: Normocephalic and atraumatic.   Neck: Normal range of motion. Neck supple.   Cardiovascular: Normal rate, regular rhythm and normal heart sounds. Exam reveals no gallop and no friction rub.    No murmur heard.  No LE edema   Pulmonary/Chest: Breath sounds normal. She has no wheezes. She has no rhonchi. She has no rales.   Abdominal: Soft. There is tenderness. There is no rebound and no guarding.   High pitched bowel sounds   Musculoskeletal: Normal range of motion.   Neurological: She is alert and oriented to person, place, and time.   Skin: Skin is warm and dry. No rash noted. No  erythema.   Psychiatric: She has a normal mood and affect.         ED Course   Procedures  Labs Reviewed   CBC W/ AUTO DIFFERENTIAL - Abnormal; Notable for the following components:       Result Value    Hemoglobin 11.2 (*)     Hematocrit 35.8 (*)     Mean Corpuscular Hemoglobin 26.6 (*)     Mean Corpuscular Hemoglobin Conc 31.3 (*)     RDW 19.1 (*)     Lymph # 0.7 (*)     Gran% 81.2 (*)     Lymph% 11.4 (*)     All other components within normal limits   COMPREHENSIVE METABOLIC PANEL - Abnormal; Notable for the following components:    Glucose 135 (*)     Creatinine 2.1 (*)     eGFR if  27.3 (*)     eGFR if non  23.7 (*)     All other components within normal limits   URINALYSIS, REFLEX TO URINE CULTURE - Abnormal; Notable for the following components:    Occult Blood UA 1+ (*)     All other components within normal limits    Narrative:     Preferred Collection Type->Urine, Clean Catch   URINALYSIS MICROSCOPIC - Abnormal; Notable for the following components:    Hyaline Casts, UA 3 (*)     All other components within normal limits    Narrative:     Preferred Collection Type->Urine, Clean Catch   LIPASE   TROPONIN I   B-TYPE NATRIURETIC PEPTIDE   LACTIC ACID, PLASMA   POCT GLUCOSE   POCT GLUCOSE MONITORING CONTINUOUS          Imaging Results          X-Ray Abdomen AP 1 View (KUB) (Final result)  Result time 11/30/19 19:59:59    Final result by Cam Lugo MD (11/30/19 19:59:59)                 Impression:      As above      Electronically signed by: Cam Lugo MD  Date:    11/30/2019  Time:    19:59             Narrative:    EXAMINATION:  XR ABDOMEN AP 1 VIEW    CLINICAL HISTORY:  Encounter for fitting and adjustment of other gastrointestinal appliance and device    TECHNIQUE:  AP View(s) of the abdomen was performed.    COMPARISON:  CT 11/30/2019    FINDINGS:  Single-view abdomen supine.    Nasogastric tube tip and side hole projects over the expected location of the  gastric lumen.  There may be a trace left pleural effusion versus atelectasis.  Please see CT 11/30/2019 for details of the bowel.                                CT Abdomen Pelvis  Without Contrast (Final result)  Result time 11/30/19 18:35:42    Final result by Cam Lugo MD (11/30/19 18:35:42)                 Impression:      This report was flagged in Epic as abnormal.    1. Dilated mid small bowel noting there is a region of partial mesenteric swirling within the right lower quadrant.  Distal to this, the small bowel is decompressed.  This may reflect developing partial small bowel obstruction on the basis of adhesions, differential would include infectious or inflammatory enteritis.  Clinical correlation is advised.  No pneumatosis or free air at this time.  2. There is strand-like fluid in the pelvis as well as mild perihepatic fluid.  There is a mild pericardial effusion.  Correlation with volume status advised.  3. Slight indistinctness about few diverticula involving the mid sigmoid colon, this is likely related to adjacent strand-like fluid rather than diverticular inflammation although correlation is advised.  4. Cholelithiasis and several additional findings above.      Electronically signed by: Cam Lugo MD  Date:    11/30/2019  Time:    18:35             Narrative:    EXAMINATION:  CT ABDOMEN PELVIS WITHOUT CONTRAST    CLINICAL HISTORY:  Abdominal pain, unspecified;    TECHNIQUE:  Low dose axial images, sagittal and coronal reformations were obtained from the lung bases to the pubic symphysis.  Oral contrast was not administered.    COMPARISON:  CT thorax 05/02/2015    FINDINGS:  Images of the lower thorax are remarkable for bilateral dependent atelectasis/scarring.  There are 2 pulmonary nodules within the right lower lobe, the larger measures 9 mm, the smaller measures 5 mm.  In comparison to examination 05/02/2015, these nodules are stable in size to slightly smaller strongly favoring  benign etiology.  There is calcification in the distribution of the coronary arteries.  There is a mild pericardial effusion.  The heart is prominent.    The liver, spleen, pancreas and adrenal glands have a grossly unremarkable noncontrast appearance.  There is cholelithiasis without pericholecystic fluid or inflammation.  The stomach is mildly distended with ingested liquid, no significant gastric wall thickening.  There is no biliary dilation or ascites.  No significant abdominal lymphadenopathy.    There is bilateral perinephric fat stranding.  There is left nonobstructive nephrolithiasis, no right nephrolithiasis.  No hydronephrosis.  The bilateral ureters are unremarkable without calculi seen.  The urinary bladder is mildly distended.  The uterus is absent the adnexa is unremarkable.    There are several scattered colonic diverticula, particularly involving the sigmoid colon.  There is slight indistinctness about a few diverticula involving the mid sigmoid colon, possibly reflecting early changes of acute diverticulitis.  There is strand-like fluid along the left pericolic gutter.  The terminal ileum is unremarkable.  The appendix is not confidently identified, no pericecal inflammation.  There is perihepatic fluid.  There are several prominent small bowel loops, fluid-filled, involving the mid abdomen with associated inflammation.  No significant wall thickening.  There is some swirling of the mesentery within the right lower pelvis, small bowel dilation may be on the basis of adhesion.  No focal organized pelvic fluid collection.  There is atherosclerotic calcification of the aorta and its branches.  No focal organized pelvic fluid collection.    Degenerative changes are noted of the spine.  There is atherosclerotic calcification of the aorta and its branches.  No significant inguinal lymphadenopathy.  Surgical changes are noted of the anterior abdominal wall.                                 Medical Decision  Making:   History:   Old Medical Records: I decided to obtain old medical records.  Clinical Tests:   Lab Tests: Ordered and Reviewed  Radiological Study: Ordered and Reviewed  Medical Tests: Reviewed and Ordered  Other:   I have discussed this case with another health care provider.       <> Summary of the Discussion: General Surgery       APC / Resident Notes:   69-year-old  female with history of COPD, heart failure, pulmonary hypertension presents to the ED complaining of nausea, vomiting, and abdominal pain since yesterday.  Vital signs stable. Patient appears uncomfortable secondary to pain.  Regular rate and rhythm.  Lungs are clear.  Abdomen is soft with generalized tenderness.  High-pitched bowel sounds noted. No lower extremity edema. Differential diagnosis includes but is not limited to gastroenteritis, SBO, UTI, fluid overload, pancreatitis, ACS.  Will obtain labs and CT abdomen pelvis further evaluation.    Troponin and BMP within normal limits.  Lipase normal. TREVER noted with creatinine 2.1, baseline 1.2. UA with no infection. UA with no infection.     CT abdomen/pelvis concerning for partial SBO. NG tube placed. Discussed with general surgery and they evaluated in the ED. They will admit to their service for further management.          Attending Attestation:     Physician Attestation Statement for NP/PA:   I have conducted a face to face encounter with this patient in addition to the NP/PA, due to Medical Complexity    Other NP/PA Attestation Additions:      Medical Decision Makin-year-old female presents with diffuse abdominal pain abdominal distention nausea vomiting not passing gas last bowel movement was yesterday  History of hysterectomy no fever  No previous history of small-bowel obstruction  Abdomen is distended high-pitched bowel sounds, diffusely tender to palpation no peritoneal signs    Zofran, Reglan, IV fluids  A KI  CT abdomen and pelvis                                Clinical Impression:       ICD-10-CM ICD-9-CM   1. Abdominal pain, unspecified abdominal location R10.9 789.00   2. Epigastric abdominal pain R10.13 789.06   3. Encounter for nasogastric (NG) tube placement Z46.59 V53.59   4. Non-intractable vomiting with nausea, unspecified vomiting type R11.2 787.01   5. SBO (small bowel obstruction) K56.609 560.9         Disposition:   Disposition: Admitted  General Surgery                     Eryn Barajas PA-C  11/30/19 [id number]

## 2019-11-30 NOTE — ED NOTES
"The patient came to the ER today accompanied by her  with complaints of epigastric abdominal pain that radiates down into the lower abdomen. The patient reports vomiting yesterday and the pain then went away. Abdomen appears distended with high pitched bowel sounds noted on exam. Pt appears uncomfortable and reports her pain to be 10/10. Last bowel movement yesterday and states she has not been passing gas. Last oral intake was around 12 noon but pt states "it all came up". Hx of pulmonary htn. Pt is normally on oxygen at 2L/min at home. Pt is currently on an abx of a sinus infection  "

## 2019-12-01 PROBLEM — R10.9 ABDOMINAL PAIN: Status: ACTIVE | Noted: 2019-12-01

## 2019-12-01 PROBLEM — K56.609 SBO (SMALL BOWEL OBSTRUCTION): Status: ACTIVE | Noted: 2019-12-01

## 2019-12-01 LAB
ALBUMIN SERPL BCP-MCNC: 3.7 G/DL (ref 3.5–5.2)
ALP SERPL-CCNC: 59 U/L (ref 55–135)
ALT SERPL W/O P-5'-P-CCNC: 17 U/L (ref 10–44)
ANION GAP SERPL CALC-SCNC: 8 MMOL/L (ref 8–16)
AST SERPL-CCNC: 18 U/L (ref 10–40)
BASOPHILS # BLD AUTO: 0.02 K/UL (ref 0–0.2)
BASOPHILS NFR BLD: 0.3 % (ref 0–1.9)
BILIRUB SERPL-MCNC: 0.5 MG/DL (ref 0.1–1)
BUN SERPL-MCNC: 26 MG/DL (ref 8–23)
CALCIUM SERPL-MCNC: 9.2 MG/DL (ref 8.7–10.5)
CHLORIDE SERPL-SCNC: 108 MMOL/L (ref 95–110)
CO2 SERPL-SCNC: 29 MMOL/L (ref 23–29)
CREAT SERPL-MCNC: 2 MG/DL (ref 0.5–1.4)
DIFFERENTIAL METHOD: ABNORMAL
EOSINOPHIL # BLD AUTO: 0 K/UL (ref 0–0.5)
EOSINOPHIL NFR BLD: 0.3 % (ref 0–8)
ERYTHROCYTE [DISTWIDTH] IN BLOOD BY AUTOMATED COUNT: 18.9 % (ref 11.5–14.5)
EST. GFR  (AFRICAN AMERICAN): 28.9 ML/MIN/1.73 M^2
EST. GFR  (NON AFRICAN AMERICAN): 25.1 ML/MIN/1.73 M^2
GLUCOSE SERPL-MCNC: 77 MG/DL (ref 70–110)
HCT VFR BLD AUTO: 34.8 % (ref 37–48.5)
HGB BLD-MCNC: 10.5 G/DL (ref 12–16)
IMM GRANULOCYTES # BLD AUTO: 0.02 K/UL (ref 0–0.04)
IMM GRANULOCYTES NFR BLD AUTO: 0.3 % (ref 0–0.5)
INR PPP: 1 (ref 0.8–1.2)
LYMPHOCYTES # BLD AUTO: 1 K/UL (ref 1–4.8)
LYMPHOCYTES NFR BLD: 15.3 % (ref 18–48)
MAGNESIUM SERPL-MCNC: 2.4 MG/DL (ref 1.6–2.6)
MCH RBC QN AUTO: 26.1 PG (ref 27–31)
MCHC RBC AUTO-ENTMCNC: 30.2 G/DL (ref 32–36)
MCV RBC AUTO: 86 FL (ref 82–98)
MONOCYTES # BLD AUTO: 0.5 K/UL (ref 0.3–1)
MONOCYTES NFR BLD: 7.5 % (ref 4–15)
NEUTROPHILS # BLD AUTO: 4.9 K/UL (ref 1.8–7.7)
NEUTROPHILS NFR BLD: 76.3 % (ref 38–73)
NRBC BLD-RTO: 0 /100 WBC
PHOSPHATE SERPL-MCNC: 4.2 MG/DL (ref 2.7–4.5)
PLATELET # BLD AUTO: 206 K/UL (ref 150–350)
PMV BLD AUTO: 9 FL (ref 9.2–12.9)
POCT GLUCOSE: 60 MG/DL (ref 70–110)
POCT GLUCOSE: 60 MG/DL (ref 70–110)
POCT GLUCOSE: 68 MG/DL (ref 70–110)
POCT GLUCOSE: 77 MG/DL (ref 70–110)
POCT GLUCOSE: 91 MG/DL (ref 70–110)
POTASSIUM SERPL-SCNC: 5 MMOL/L (ref 3.5–5.1)
PROT SERPL-MCNC: 6.8 G/DL (ref 6–8.4)
PROTHROMBIN TIME: 10.7 SEC (ref 9–12.5)
RBC # BLD AUTO: 4.03 M/UL (ref 4–5.4)
SODIUM SERPL-SCNC: 145 MMOL/L (ref 136–145)
WBC # BLD AUTO: 6.42 K/UL (ref 3.9–12.7)

## 2019-12-01 PROCEDURE — G0378 HOSPITAL OBSERVATION PER HR: HCPCS

## 2019-12-01 PROCEDURE — 94640 AIRWAY INHALATION TREATMENT: CPT

## 2019-12-01 PROCEDURE — 63600367 HC NITRIC OXIDE PER HOUR

## 2019-12-01 PROCEDURE — 25000242 PHARM REV CODE 250 ALT 637 W/ HCPCS: Performed by: STUDENT IN AN ORGANIZED HEALTH CARE EDUCATION/TRAINING PROGRAM

## 2019-12-01 PROCEDURE — 63600175 PHARM REV CODE 636 W HCPCS: Performed by: INTERNAL MEDICINE

## 2019-12-01 PROCEDURE — 80053 COMPREHEN METABOLIC PANEL: CPT

## 2019-12-01 PROCEDURE — 25000242 PHARM REV CODE 250 ALT 637 W/ HCPCS: Performed by: INTERNAL MEDICINE

## 2019-12-01 PROCEDURE — 25000003 PHARM REV CODE 250: Performed by: INTERNAL MEDICINE

## 2019-12-01 PROCEDURE — 25000003 PHARM REV CODE 250: Performed by: PHYSICIAN ASSISTANT

## 2019-12-01 PROCEDURE — 96375 TX/PRO/DX INJ NEW DRUG ADDON: CPT | Performed by: EMERGENCY MEDICINE

## 2019-12-01 PROCEDURE — 96372 THER/PROPH/DIAG INJ SC/IM: CPT | Mod: 59 | Performed by: EMERGENCY MEDICINE

## 2019-12-01 PROCEDURE — 94761 N-INVAS EAR/PLS OXIMETRY MLT: CPT

## 2019-12-01 PROCEDURE — 96361 HYDRATE IV INFUSION ADD-ON: CPT | Performed by: EMERGENCY MEDICINE

## 2019-12-01 PROCEDURE — 36415 COLL VENOUS BLD VENIPUNCTURE: CPT

## 2019-12-01 PROCEDURE — 25000242 PHARM REV CODE 250 ALT 637 W/ HCPCS: Performed by: PHYSICIAN ASSISTANT

## 2019-12-01 PROCEDURE — 27000221 HC OXYGEN, UP TO 24 HOURS

## 2019-12-01 PROCEDURE — 63600175 PHARM REV CODE 636 W HCPCS: Performed by: STUDENT IN AN ORGANIZED HEALTH CARE EDUCATION/TRAINING PROGRAM

## 2019-12-01 PROCEDURE — 20000000 HC ICU ROOM

## 2019-12-01 PROCEDURE — 85610 PROTHROMBIN TIME: CPT

## 2019-12-01 PROCEDURE — 63600175 PHARM REV CODE 636 W HCPCS: Performed by: PHYSICIAN ASSISTANT

## 2019-12-01 PROCEDURE — 99222 1ST HOSP IP/OBS MODERATE 55: CPT | Mod: ,,, | Performed by: INTERNAL MEDICINE

## 2019-12-01 PROCEDURE — 99222 PR INITIAL HOSPITAL CARE,LEVL II: ICD-10-PCS | Mod: ,,, | Performed by: INTERNAL MEDICINE

## 2019-12-01 PROCEDURE — 85025 COMPLETE CBC W/AUTO DIFF WBC: CPT

## 2019-12-01 PROCEDURE — 83735 ASSAY OF MAGNESIUM: CPT

## 2019-12-01 PROCEDURE — 99900035 HC TECH TIME PER 15 MIN (STAT)

## 2019-12-01 PROCEDURE — 84100 ASSAY OF PHOSPHORUS: CPT

## 2019-12-01 PROCEDURE — 25500020 PHARM REV CODE 255: Performed by: STUDENT IN AN ORGANIZED HEALTH CARE EDUCATION/TRAINING PROGRAM

## 2019-12-01 RX ORDER — FLUTICASONE PROPIONATE 50 MCG
2 SPRAY, SUSPENSION (ML) NASAL ONCE
Status: COMPLETED | OUTPATIENT
Start: 2019-12-01 | End: 2019-12-01

## 2019-12-01 RX ORDER — IPRATROPIUM BROMIDE AND ALBUTEROL SULFATE 2.5; .5 MG/3ML; MG/3ML
3 SOLUTION RESPIRATORY (INHALATION) EVERY 6 HOURS PRN
Status: DISCONTINUED | OUTPATIENT
Start: 2019-12-01 | End: 2019-12-03

## 2019-12-01 RX ADMIN — BUDESONIDE AND FORMOTEROL FUMARATE DIHYDRATE 2 PUFF: 160; 4.5 AEROSOL RESPIRATORY (INHALATION) at 08:12

## 2019-12-01 RX ADMIN — HEPARIN SODIUM 5000 UNITS: 5000 INJECTION, SOLUTION INTRAVENOUS; SUBCUTANEOUS at 06:12

## 2019-12-01 RX ADMIN — IPRATROPIUM BROMIDE AND ALBUTEROL SULFATE 3 ML: .5; 3 SOLUTION RESPIRATORY (INHALATION) at 08:12

## 2019-12-01 RX ADMIN — IPRATROPIUM BROMIDE AND ALBUTEROL SULFATE 3 ML: .5; 3 SOLUTION RESPIRATORY (INHALATION) at 03:12

## 2019-12-01 RX ADMIN — IPRATROPIUM BROMIDE AND ALBUTEROL SULFATE 3 ML: .5; 3 SOLUTION RESPIRATORY (INHALATION) at 10:12

## 2019-12-01 RX ADMIN — FLUTICASONE PROPIONATE 100 MCG: 50 SPRAY, METERED NASAL at 02:12

## 2019-12-01 RX ADMIN — HEPARIN SODIUM 5000 UNITS: 5000 INJECTION, SOLUTION INTRAVENOUS; SUBCUTANEOUS at 09:12

## 2019-12-01 RX ADMIN — DEXTROSE 125 ML: 10 SOLUTION INTRAVENOUS at 11:12

## 2019-12-01 RX ADMIN — BUDESONIDE AND FORMOTEROL FUMARATE DIHYDRATE 2 PUFF: 160; 4.5 AEROSOL RESPIRATORY (INHALATION) at 09:12

## 2019-12-01 RX ADMIN — IOHEXOL 50 ML: 350 INJECTION, SOLUTION INTRAVENOUS at 09:12

## 2019-12-01 RX ADMIN — FLUTICASONE PROPIONATE 50 MCG: 50 SPRAY, METERED NASAL at 09:12

## 2019-12-01 NOTE — PLAN OF CARE
Pt AAOx4. VS as charted. Q2 rounding for pt care and safety. Denies pain, NV.  No falls/injury reported this shift. Ng to LIWS, with brown drainage - output as charted. SCD in place. Safety precautions maintained - bed in low position, call light in reach, side rails up x2.

## 2019-12-01 NOTE — NURSING
Report received. Care assumed. Patient arrived via stretcher AAOx4. No c/o pain or NV at this time. Ambulated to bathroom, x1 assist. Pt placed on 3 L NC, no signs of SOB or distress. NG to L nare to LIWS with brown drainage. PIV to L hand flushed and connected to LR mIVF. See assessment. Walker and bedside commode in room per patient request. Patient oriented to room. Bed in lowest position, side rails up x2, bed wheels locked and call light within reach, no acute distress noted.

## 2019-12-01 NOTE — NURSING
Report called to LARY Macias on TSU. Pt awaiting transport to [facility], then transfer to transplant stepdown unit.

## 2019-12-01 NOTE — PROGRESS NOTES
[facility]  General Surgery  Progress Note    Subjective:       Post-Op Info:  * No surgery found *         Interval History: NAEON. Afebrile. NGT to LIWS with 550cc dark brown output. No nausea or vomiting. Pain well controlled. Cardiology and pulmonology consulted for history of CHF and Pulm HTN, on home rate of oxygen satting well.      Medications:  Continuous Infusions:   lactated ringers 75 mL/hr at 11/30/19 2245     Scheduled Meds:   budesonide-formoterol 160-4.5 mcg  2 puff Inhalation Q12H    fluticasone propionate  1 spray Each Nostril Daily    heparin (porcine)  5,000 Units Subcutaneous Q8H     PRN Meds:acetaminophen, albuterol-ipratropium, Dextrose 10% Bolus, glucagon (human recombinant), insulin aspart U-100, ondansetron, sodium chloride 0.9%     Review of patient's allergies indicates:   Allergen Reactions    Penicillins Itching    Sulfa (sulfonamide antibiotics) Itching     Objective:     Vital Signs (Most Recent):  Temp: 99 °F (37.2 °C) (12/01/19 0333)  Pulse: 80 (12/01/19 0355)  Resp: 18 (12/01/19 0355)  BP: (!) 102/52 (12/01/19 0333)  SpO2: 98 % (12/01/19 0355) Vital Signs (24h Range):  Temp:  [98.2 °F (36.8 °C)-99.1 °F (37.3 °C)] 99 °F (37.2 °C)  Pulse:  [80-92] 80  Resp:  [15-24] 18  SpO2:  [92 %-98 %] 98 %  BP: (101-162)/(52-74) 102/52     Weight: 92.5 kg (204 lb)  Body mass index is 32.93 kg/m².    Intake/Output - Last 3 Shifts       11/29 0700 - 11/30 0659 11/30 0700 - 12/01 0659    Drains  550    Total Output  550    Net  -550                Physical Exam   Constitutional: She is oriented to person, place, and time. She appears well-developed and well-nourished.   HENT:   Head: Normocephalic and atraumatic.   Eyes: EOM are normal.   Neck: Normal range of motion. No tracheal deviation present.   Cardiovascular: Normal rate and intact distal pulses.   Pulmonary/Chest: Effort normal. No respiratory distress.   Abdominal: Soft. She exhibits no distension. There is  tenderness (mildly tender to palpation). There is no guarding.   Musculoskeletal: Normal range of motion. She exhibits no edema.   Neurological: She is alert and oriented to person, place, and time.   Skin: Skin is warm and dry.   Vitals reviewed.      Significant Labs:  CBC:   Recent Labs   Lab 12/01/19  0433   WBC 6.42   RBC 4.03   HGB 10.5*   HCT 34.8*      MCV 86   MCH 26.1*   MCHC 30.2*     BMP:   Recent Labs   Lab 12/01/19  0433   GLU 77      K 5.0      CO2 29   BUN 26*   CREATININE 2.0*   CALCIUM 9.2   MG 2.4       Significant Diagnostics:  I have reviewed all pertinent imaging results/findings within the past 24 hours.    Assessment/Plan:     Epigastric abdominal pain  68 yo F presented with epigastric abdominal pain, found to have partial small bowel obstruction on imaging. Admitted for NGT decompression and management.   - NGT to LIWS  - NPO with mIVF  - Scheduled duo nebs  - Daily labs, replace electrolytes PRN  - Cardiology and Pulmonology consulted for help with managing CHF and Pulm HTN (followed as outpatient)  - [facility] and SCD's        Sophie Liang MD  General Surgery  [facility]

## 2019-12-01 NOTE — SUBJECTIVE & OBJECTIVE
Interval History: NAEON. Afebrile. NGT to LIWS with 550cc dark brown output. No nausea or vomiting. Pain well controlled. Cardiology and pulmonology consulted for history of CHF and Pulm HTN, on home rate of oxygen satting well.      Medications:  Continuous Infusions:   lactated ringers 75 mL/hr at 11/30/19 2245     Scheduled Meds:   budesonide-formoterol 160-4.5 mcg  2 puff Inhalation Q12H    fluticasone propionate  1 spray Each Nostril Daily    heparin (porcine)  5,000 Units Subcutaneous Q8H     PRN Meds:acetaminophen, albuterol-ipratropium, Dextrose 10% Bolus, glucagon (human recombinant), insulin aspart U-100, ondansetron, sodium chloride 0.9%     Review of patient's allergies indicates:   Allergen Reactions    Penicillins Itching    Sulfa (sulfonamide antibiotics) Itching     Objective:     Vital Signs (Most Recent):  Temp: 99 °F (37.2 °C) (12/01/19 0333)  Pulse: 80 (12/01/19 0355)  Resp: 18 (12/01/19 0355)  BP: (!) 102/52 (12/01/19 0333)  SpO2: 98 % (12/01/19 0355) Vital Signs (24h Range):  Temp:  [98.2 °F (36.8 °C)-99.1 °F (37.3 °C)] 99 °F (37.2 °C)  Pulse:  [80-92] 80  Resp:  [15-24] 18  SpO2:  [92 %-98 %] 98 %  BP: (101-162)/(52-74) 102/52     Weight: 92.5 kg (204 lb)  Body mass index is 32.93 kg/m².    Intake/Output - Last 3 Shifts       11/29 0700 - 11/30 0659 11/30 0700 - 12/01 0659    Drains  550    Total Output  550    Net  -550                Physical Exam   Constitutional: She is oriented to person, place, and time. She appears well-developed and well-nourished.   HENT:   Head: Normocephalic and atraumatic.   Eyes: EOM are normal.   Neck: Normal range of motion. No tracheal deviation present.   Cardiovascular: Normal rate and intact distal pulses.   Pulmonary/Chest: Effort normal. No respiratory distress.   Abdominal: Soft. She exhibits no distension. There is tenderness (mildly tender to palpation). There is no guarding.   Musculoskeletal: Normal range of motion. She exhibits no edema.    Neurological: She is alert and oriented to person, place, and time.   Skin: Skin is warm and dry.   Vitals reviewed.      Significant Labs:  CBC:   Recent Labs   Lab 12/01/19  0433   WBC 6.42   RBC 4.03   HGB 10.5*   HCT 34.8*      MCV 86   MCH 26.1*   MCHC 30.2*     BMP:   Recent Labs   Lab 12/01/19  0433   GLU 77      K 5.0      CO2 29   BUN 26*   CREATININE 2.0*   CALCIUM 9.2   MG 2.4       Significant Diagnostics:  I have reviewed all pertinent imaging results/findings within the past 24 hours.

## 2019-12-01 NOTE — ASSESSMENT & PLAN NOTE
68 yo F presented with epigastric abdominal pain, found to have partial small bowel obstruction on imaging. Admitted for NGT decompression and management.   - NGT to LIWS  - NPO with mIVF  - Scheduled duo nebs  - Daily labs, replace electrolytes PRN  - Cardiology and Pulmonology consulted for help with managing CHF and Pulm HTN (followed as outpatient)  - SQH and SCD's

## 2019-12-01 NOTE — NURSING
Pt being transported off the floor to TSU. ID band, fall risk band, and allergy band in place. Transporting via wheelchair.

## 2019-12-01 NOTE — PROGRESS NOTES
Transferred pt to SICU with this RN and charge RN. Receiving nurse at the BS. All personal belongings and chart sent with pt. Informed Elijah, pt's brother, of pt's transfer.

## 2019-12-01 NOTE — CONSULTS
[facility]  General Surgery  History & Physical    Patient Name: Bessie Davis  MRN: [id number]  Admission Date: 11/30/2019  Attending Physician: Julianna Schulte MD   Primary Care Provider: Priscilla Frankel MD    Patient information was obtained from patient, past medical records and ER records.     Subjective:     Chief Complaint/Reason for Admission: Partial small bowel obstruction    History of Present Illness:  Patient is a 69 y.o. female with CHF, pulmonary HTN, COPD on 3L NC at baseline, HTN, HLD, DM who presented to the ED with a 1 day history of sharp abdominal pain and vomiting. She states it began yesterday and she experienced 1 episode of vomiting with the pain. She reports non-bilious vomiting of food that she had recently eaten. She states that she went to sleep and when she woke up, her pain had gotten better, however it started again as soon as she tried to eat. The pain persisted and she presented to the ED for evaluation. She states that currently she is completely pain free. Her last bowel movement was this morning but she reports minimal flatus. She has never experienced anything like this before. She reports a history of back surgery and a hysterectomy. She denies any associated fevers, chills or sick contacts.     No current facility-administered medications on file prior to encounter.      Current Outpatient Medications on File Prior to Encounter   Medication Sig    albuterol 90 mcg/actuation inhaler Inhale 2 puffs into the lungs every 6 (six) hours as needed for Wheezing.    allopurinol (ZYLOPRIM) 100 MG tablet Take 100 mg by mouth once daily.     ammonium lactate 12 % Crea Apply to feet daily after showering    aspirin (ECOTRIN) 81 MG EC tablet Take 1 tablet (81 mg total) by mouth once daily.    atorvastatin (LIPITOR) 80 MG tablet Take 1 tablet (80 mg total) by mouth every evening. DO NOT TAKE UNTIL FOLLOWING UP WITH MD.    azithromycin (ZITHROMAX) 1 gram Pack Take 1 g  by mouth once.    budesonide-formoterol 160-4.5 mcg (SYMBICORT) 160-4.5 mcg/actuation HFAA Inhale 2 puffs into the lungs every 12 (twelve) hours.    bumetanide (BUMEX) 1 MG tablet Take 2 tablets (2 mg total) by mouth 2 (two) times daily. DO NOT TAKE UNTIL FOLLOWING UP WITH MD. (Patient taking differently: Take by mouth 2 (two) times daily. )    CALCIUM ACETATE ORAL Take 1 tablet by mouth once daily.    cetirizine (ZYRTEC) 5 MG tablet Take 1 tablet (5 mg total) by mouth once daily. (Patient taking differently: Take 5 mg by mouth as needed. )    CHERATUSSIN DAC 30- mg/5 mL Syrp as needed.     colchicine (COLCRYS) 0.6 mg tablet Take 0.6 mg by mouth as needed.     fluticasone (FLONASE) 50 mcg/actuation nasal spray     guaifenesin (MUCINEX) 600 mg 12 hr tablet Take 1,200 mg by mouth 2 (two) times daily as needed for Congestion.    hydrocodone-acetaminophen 5-300 mg Tab Take 1 tablet by mouth 2 (two) times daily as needed (for pain).     irbesartan (AVAPRO) 150 MG tablet Take 150 mg by mouth once daily.    macitentan (OPSUMIT) 10 mg Tab Take 10 mg by mouth once daily.    magnesium oxide (MAG-OX) 400 mg tablet Take 1 tablet (400 mg total) by mouth once daily.    metFORMIN (GLUCOPHAGE) 500 MG tablet Take 500 mg by mouth 2 (two) times daily.    mupirocin (BACTROBAN) 2 % ointment     mv-mn/iron/folic acid/herb 190 (VITAMIN D3 COMPLETE ORAL) Take 500 mg by mouth.    pantoprazole (PROTONIX) 40 MG tablet     potassium chloride SA (K-DUR,KLOR-CON) 20 MEQ tablet Take 1 tablet (20 mEq total) by mouth once daily. DO NOT RESTART UNTIL YOU RESTART YOUR BUMEX.    promethazine-codeine 6.25-10 mg/5 ml (PHENERGAN WITH CODEINE) 6.25-10 mg/5 mL syrup TAKE 1-2 TEASPOONSFUL BY MOUTH 4 TIMES DAILY AS NEEDED FOR COUGH    ranitidine (ZANTAC) 150 MG tablet Take 150 mg by mouth once daily.    riociguat (ADEMPAS) 2.5 mg tablet Take 2.5 mg by mouth 3 (three) times daily.    selexipag (UPTRAVI) 1,600 mcg Tab Take by mouth.     triamcinolone acetonide 0.1% (KENALOG) 0.1 % cream        Review of patient's allergies indicates:   Allergen Reactions    Penicillins     Sulfa (sulfonamide antibiotics)        Past Medical History:   Diagnosis Date    Asthma     COPD (chronic obstructive pulmonary disease)     Diastolic dysfunction with heart failure 5/20/2015    Occl RCA, high grade prox LAD and LCfx cath [facility] Dec 2014 5/11/2015    Pulmonary HTN     Sleep apnea      Past Surgical History:   Procedure Laterality Date    BACK SURGERY      HYSTERECTOMY      RIGHT HEART CATHETERIZATION Right 9/17/2018    Procedure: HEART CATH-RIGHT;  Surgeon: Le Salmeron MD;  Location: [facility] CATH LAB;  Service: Cardiology;  Laterality: Right;    RIGHT HEART CATHETERIZATION Right 7/8/2019    Procedure: HEART CATH-RIGHT;  Surgeon: Le Salmeron MD;  Location: [facility] CATH LAB;  Service: Cardiology;  Laterality: Right;     Family History     Problem Relation (Age of Onset)    Cancer Sister        Tobacco Use    Smoking status: Former Smoker     Packs/day: 0.25     Years: 15.00     Pack years: 3.75     Types: Cigarettes     Last attempt to quit: 11/2016     Years since quitting: 3.0    Smokeless tobacco: Never Used   Substance and Sexual Activity    Alcohol use: No    Drug use: No    Sexual activity: Not on file     Review of Systems   Constitutional: Positive for appetite change. Negative for activity change and fever.   Respiratory: Negative for cough and shortness of breath.    Cardiovascular: Negative for chest pain and palpitations.   Gastrointestinal: Positive for abdominal pain and vomiting. Negative for abdominal distention, constipation and diarrhea.   Genitourinary: Negative for difficulty urinating.   Musculoskeletal: Negative for arthralgias and myalgias.     Objective:     Vital Signs (Most Recent):  Temp: 98.2 °F (36.8 °C) (11/30/19 1544)  Pulse: 86 (11/30/19 1902)  Resp: 18 (11/30/19 1902)  BP: (!) 143/71 (11/30/19 1902)  SpO2:  95 % (11/30/19 1902) Vital Signs (24h Range):  Temp:  [98.2 °F (36.8 °C)] 98.2 °F (36.8 °C)  Pulse:  [83-88] 86  Resp:  [15-24] 18  SpO2:  [92 %-97 %] 95 %  BP: (138-162)/(61-74) 143/71     Weight: 93.4 kg (206 lb)  Body mass index is 33.25 kg/m².    Physical Exam   Constitutional: She is oriented to person, place, and time. She appears well-developed and well-nourished. No distress.   Cardiovascular: Normal rate and regular rhythm.   Pulmonary/Chest: Effort normal. No respiratory distress.   On 3L NC   Abdominal: Soft. She exhibits no distension. There is no tenderness. There is no rebound and no guarding.   Abdomen obese, currently completely non-tender to palpation   Neurological: She is alert and oriented to person, place, and time.   Skin: Skin is warm and dry.       Significant Labs:  CBC:   Recent Labs   Lab 11/30/19  1610   WBC 6.14   RBC 4.21   HGB 11.2*   HCT 35.8*      MCV 85   MCH 26.6*   MCHC 31.3*     CMP:   Recent Labs   Lab 11/30/19  1610   *   CALCIUM 9.8   ALBUMIN 4.2   PROT 7.7      K 4.8   CO2 25      BUN 23   CREATININE 2.1*   ALKPHOS 64   ALT 17   AST 14   BILITOT 0.6       Significant Diagnostics:  CT:  EXAMINATION:  CT ABDOMEN PELVIS WITHOUT CONTRAST    CLINICAL HISTORY:  Abdominal pain, unspecified;    TECHNIQUE:  Low dose axial images, sagittal and coronal reformations were obtained from the lung bases to the pubic symphysis.  Oral contrast was not administered.    COMPARISON:  CT thorax 05/02/2015    FINDINGS:  Images of the lower thorax are remarkable for bilateral dependent atelectasis/scarring.  There are 2 pulmonary nodules within the right lower lobe, the larger measures 9 mm, the smaller measures 5 mm.  In comparison to examination 05/02/2015, these nodules are stable in size to slightly smaller strongly favoring benign etiology.  There is calcification in the distribution of the coronary arteries.  There is a mild pericardial effusion.  The heart is  prominent.    The liver, spleen, pancreas and adrenal glands have a grossly unremarkable noncontrast appearance.  There is cholelithiasis without pericholecystic fluid or inflammation.  The stomach is mildly distended with ingested liquid, no significant gastric wall thickening.  There is no biliary dilation or ascites.  No significant abdominal lymphadenopathy.    There is bilateral perinephric fat stranding.  There is left nonobstructive nephrolithiasis, no right nephrolithiasis.  No hydronephrosis.  The bilateral ureters are unremarkable without calculi seen.  The urinary bladder is mildly distended.  The uterus is absent the adnexa is unremarkable.    There are several scattered colonic diverticula, particularly involving the sigmoid colon.  There is slight indistinctness about a few diverticula involving the mid sigmoid colon, possibly reflecting early changes of acute diverticulitis.  There is strand-like fluid along the left pericolic gutter.  The terminal ileum is unremarkable.  The appendix is not confidently identified, no pericecal inflammation.  There is perihepatic fluid.  There are several prominent small bowel loops, fluid-filled, involving the mid abdomen with associated inflammation.  No significant wall thickening.  There is some swirling of the mesentery within the right lower pelvis, small bowel dilation may be on the basis of adhesion.  No focal organized pelvic fluid collection.  There is atherosclerotic calcification of the aorta and its branches.  No focal organized pelvic fluid collection.    Degenerative changes are noted of the spine.  There is atherosclerotic calcification of the aorta and its branches.  No significant inguinal lymphadenopathy.  Surgical changes are noted of the anterior abdominal wall.      Impression       This report was flagged in Epic as abnormal.    1. Dilated mid small bowel noting there is a region of partial mesenteric swirling within the right lower quadrant.   Distal to this, the small bowel is decompressed.  This may reflect developing partial small bowel obstruction on the basis of adhesions, differential would include infectious or inflammatory enteritis.  Clinical correlation is advised.  No pneumatosis or free air at this time.  2. There is strand-like fluid in the pelvis as well as mild perihepatic fluid.  There is a mild pericardial effusion.  Correlation with volume status advised.  3. Slight indistinctness about few diverticula involving the mid sigmoid colon, this is likely related to adjacent strand-like fluid rather than diverticular inflammation although correlation is advised.  4. Cholelithiasis and several additional findings above.      Electronically signed by: Cam Lugo MD  Date: 11/30/2019  Time: 18:35         Assessment/Plan:     Active Diagnoses:    Diagnosis Date Noted POA    Epigastric abdominal pain [R10.13] 11/30/2019 Yes      Problems Resolved During this Admission:     VTE Risk Mitigation (From admission, onward)         Ordered     heparin (porcine) injection 5,000 Units  Every 8 hours      11/30/19 1955     IP VTE HIGH RISK PATIENT  Once      11/30/19 1955            68 yo female with multiple medical comorbidities who presents to the ED with a 1 day history of abdominal pain and vomiting and CT scan findings concerning for possible PSBO. Of note, patient does have some mild mesenteric swirling on her scan. As she is completely asymptomatic currently, HDS with normal labs, will plan for observation at this time. If she is to decompensate will discuss possible diagnostic laparoscopy vs ex lap, given patient's very high surgical risk due to her medical comorbidities.    Plan:  - Admit to observation, Dr. Sahu  - NPO  - NG tube placed by ER. Will put to [facility]  - [facility]  - Serial abdominal exams  - Will check lactate  - Discussed with Dr. Luis Alberto Coleman MD  General Surgery  [facility]

## 2019-12-01 NOTE — ASSESSMENT & PLAN NOTE
PAH (WHO group 1) with most recent RHC 7/2019 - RA: 22/ 15/ 17 RV: 90/ 10/ 20 PA: 90/ 30/ 50 PWP: 25/ 25/ 25.  -In the setting of severe PH we would prefer to not withhold therapy  -Gen.Surg feels she needs to remain strict NPO and will obtain a gastrografin study today  -Will transfer to TSU and start Moy 10 ppm and titrate as needed  -Plan to start home therapy ASAP: Adempas 2.5, Uptravi 1600 mcg, Opsumit 10 mg   -Okay to hold Diuresis today in the setting of significant emesis yesterday  -BNP 20

## 2019-12-01 NOTE — CONSULTS
[facility]  Heart Transplant  Consult Note    Patient Name: Bessie Davis  MRN: [id number]  Admission Date: 11/30/2019  Hospital Length of Stay: 0 days  Attending Physician: Steven Sahu MD  Primary Care Provider: Priscilla Frankel MD   Principal Problem:Pulmonary hypertension    Consults  Subjective:     History of Present Illness:  68 y.o. 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 She  underwent a RHC that showed severe PH with normal PCWP and was started on Tyvaso. A RHC while on Tyvaso 12 breaths 4x/day, opsumit and adempas 2.0 tid she had severe PA pressures and Tyvaso was discontinued in favor of Uptravi on 7/8/19. Lives alone but her niece lives close by and she and her son keep an eye on her.    Admitted to the surgical team with acute onset abdominal pain, nausea and vomiting for 1 day prior to admission. CT showed small bowel obstruction and is NPO with NGT to low suction. On day of admission she only took her PH medications until noon. Denies any shortness of breath, cough, chest pain, lightheadedness. The cardiology team was consulted to address PH medication in the setting of NPO status.       TTE 4/2019  · Normal left ventricular systolic function. The estimated ejection fraction is 60%  · Mild right ventricular enlargement.  · Low normal right ventricular systolic function. TAPSE 2.2  · Normal LV diastolic function.  · Moderate right atrial enlargement.  · The estimated PA systolic pressure is 92 mm Hg  · Elevated central venous pressure (15 mm Hg).     RHC 9/18  RA: 16/16 (14)  RV: 65/4  RVEDP: 11   PW: 15/15 (16)  PA: 60/19 (37)  AO: 105/66 (79)  AO_SAT: 100  PA_SAT: 72    CONDITION 1 (9/17/2018 11:28:21):  BP: 110/65  HR: 78  FICKCI: 2.9400  FICKCO: 6.0500  PVR: 331.0000      TTE 2/9/18  The right  ventricle is upper limit of normal measuring 4.2 cm at the base in the apical right ventricle-focused view. Global right ventricular systolic function appears low normal. Tricuspid annular plane systolic excursion (TAPSE) is   2.1 cm. Tissue Doppler-derived tricuspid annular peak systolic velocity (S prime) is 14.0 cm/s. The estimated PA systolic pressure is 53 mmHg    1 - Normal left ventricular systolic function (EF 60-65%).     2 - Wall motion abnormalities.     3 - Biatrial enlargement.     4 - Impaired LV relaxation, elevated LAP (grade 2 diastolic dysfunction).     5 - Right ventricle is upper limit of normal in size with low normal systolic function.     6 - Trivial tricuspid regurgitation.     7 - Trivial pericardial effusion.     8 - Pulmonary hypertension. The estimated PA systolic pressure is 53 mmHg.    RHC 7/8/19:  · Severe Pulmonary hypertension (pt did not take any of her meds this am).  · Increased right and left-sided filling pressures.  · Normal Cardiac output and index by José method.  RA: 22/ 15/ 17 RV: 90/ 10/ 20 PA: 90/ 30/ 50 PWP: 25/ 25/ 25 . Cardiac output was 6.94 by José. Cardiac index is 3.4 L/min/m2. O2 Sat: PA 69%. Pulmonary vascular resistance: 3.6 CARLIN.    Past Medical History:   Diagnosis Date    Asthma     COPD (chronic obstructive pulmonary disease)     Diastolic dysfunction with heart failure 5/20/2015    Occl RCA, high grade prox LAD and LCfx cath [facility] Dec 2014 5/11/2015    Pulmonary HTN     Sleep apnea        Past Surgical History:   Procedure Laterality Date    BACK SURGERY      HYSTERECTOMY      RIGHT HEART CATHETERIZATION Right 9/17/2018    Procedure: HEART CATH-RIGHT;  Surgeon: Le Salmeron MD;  Location: [facility] CATH LAB;  Service: Cardiology;  Laterality: Right;    RIGHT HEART CATHETERIZATION Right 7/8/2019    Procedure: HEART CATH-RIGHT;  Surgeon: Le Salmeron MD;  Location: [facility] CATH LAB;  Service: Cardiology;  Laterality: Right;       Review of patient's  allergies indicates:   Allergen Reactions    Penicillins Itching    Sulfa (sulfonamide antibiotics) Itching       Current Facility-Administered Medications   Medication    acetaminophen tablet 650 mg    albuterol-ipratropium 2.5 mg-0.5 mg/3 mL nebulizer solution 3 mL    budesonide-formoterol 160-4.5 mcg inhaler 2 puff    dextrose 10% (D10W) Bolus    fluticasone propionate 50 mcg/actuation nasal spray 50 mcg    glucagon (human recombinant) injection 1 mg    heparin (porcine) injection 5,000 Units    insulin aspart U-100 pen 1-10 Units    lactated ringers infusion    ondansetron disintegrating tablet 8 mg    sodium chloride 0.9% flush 10 mL     Family History     Problem Relation (Age of Onset)    Cancer Sister        Tobacco Use    Smoking status: Former Smoker     Packs/day: 0.25     Years: 15.00     Pack years: 3.75     Types: Cigarettes     Last attempt to quit: 11/2016     Years since quitting: 3.0    Smokeless tobacco: Never Used   Substance and Sexual Activity    Alcohol use: No    Drug use: No    Sexual activity: Not on file     Review of Systems   Constitutional: Negative.    HENT: Negative.    Eyes: Negative.    Respiratory: Negative.    Cardiovascular: Negative.    Gastrointestinal: Positive for abdominal distention, abdominal pain, nausea and vomiting.   Endocrine: Negative.    Genitourinary: Negative.    Musculoskeletal: Negative.      Objective:     Vital Signs (Most Recent):  Temp: 98.8 °F (37.1 °C) (12/01/19 1139)  Pulse: 83 (12/01/19 1139)  Resp: 18 (12/01/19 1139)  BP: (!) 122/58 (12/01/19 1139)  SpO2: (!) 92 % (12/01/19 1139) Vital Signs (24h Range):  Temp:  [98.2 °F (36.8 °C)-99.1 °F (37.3 °C)] 98.8 °F (37.1 °C)  Pulse:  [77-92] 83  Resp:  [15-24] 18  SpO2:  [90 %-98 %] 92 %  BP: (101-162)/(52-74) 122/58     Patient Vitals for the past 72 hrs (Last 3 readings):   Weight   12/01/19 0021 92.5 kg (204 lb)   11/30/19 1544 93.4 kg (206 lb)     Body mass index is 32.93  kg/m².      Intake/Output Summary (Last 24 hours) at 12/1/2019 1323  Last data filed at 12/1/2019 0800  Gross per 24 hour   Intake 150 ml   Output 1050 ml   Net -900 ml       Physical Exam   Constitutional: She is oriented to person, place, and time. She appears well-developed and well-nourished.   HENT:   Head: Normocephalic.   Neck: Normal range of motion. JVD present.   Cardiovascular: Normal rate and regular rhythm.   Pulmonary/Chest: Effort normal and breath sounds normal.   Abdominal: Soft. Bowel sounds are absent. There is generalized tenderness.   Musculoskeletal: Normal range of motion. She exhibits no edema.   Neurological: She is alert and oriented to person, place, and time.   Skin: Skin is warm and dry. Capillary refill takes less than 2 seconds.       Significant Labs:  CBC:  Recent Labs   Lab 11/30/19  1610 12/01/19  0433   WBC 6.14 6.42   RBC 4.21 4.03   HGB 11.2* 10.5*   HCT 35.8* 34.8*    206   MCV 85 86   MCH 26.6* 26.1*   MCHC 31.3* 30.2*     BNP:  Recent Labs   Lab 11/30/19  1610   BNP 20     CMP:  Recent Labs   Lab 11/30/19  1610 12/01/19  0433   * 77   CALCIUM 9.8 9.2   ALBUMIN 4.2 3.7   PROT 7.7 6.8    145   K 4.8 5.0   CO2 25 29    108   BUN 23 26*   CREATININE 2.1* 2.0*   ALKPHOS 64 59   ALT 17 17   AST 14 18   BILITOT 0.6 0.5      Coagulation:   Recent Labs   Lab 12/01/19  0433   INR 1.0     LDH:  No results for input(s): LDH in the last 72 hours.  Microbiology:  Microbiology Results (last 7 days)     ** No results found for the last 168 hours. **          I have reviewed all pertinent labs within the past 24 hours.    Diagnostic Results:  I have reviewed all pertinent imaging results/findings within the past 24 hours.    Assessment/Plan:     * Pulmonary hypertension  PAH (WHO group 1) with most recent RHC 7/2019 - RA: 22/ 15/ 17 RV: 90/ 10/ 20 PA: 90/ 30/ 50 PWP: 25/ 25/ 25.  -In the setting of severe PH we would prefer to not withhold therapy  -Gen.Surg feels  she needs to remain strict NPO and will obtain a gastrografin study today  -Will transfer to TSU and start Moy 10 ppm and titrate as needed  -Plan to start home therapy ASAP: Adempas 2.5, Uptravi 1600 mcg, Opsumit 10 mg   -Okay to hold Diuresis today in the setting of significant emesis yesterday  -BNP 20    SBO (small bowel obstruction)  -Conservative management for now with bowel rest and NGT  -Rest per Gen.Surg    TREVER (acute kidney injury)  Likely pre-renal in the setting of emesis, dehydration and diuresis at home  -She is on gentle IVF and diuretic on hold  -Avoid further nephrotoxins       Thank you for your consult. We will follow with patient as primary team. This was relayed to the general surgery team.     Ramo Joe MD  Heart Transplant  [facility]

## 2019-12-01 NOTE — SUBJECTIVE & OBJECTIVE
Past Medical History:   Diagnosis Date    Asthma     COPD (chronic obstructive pulmonary disease)     Diastolic dysfunction with heart failure 5/20/2015    Occl RCA, high grade prox LAD and LCfx cath [facility] Dec 2014 5/11/2015    Pulmonary HTN     Sleep apnea        Past Surgical History:   Procedure Laterality Date    BACK SURGERY      HYSTERECTOMY      RIGHT HEART CATHETERIZATION Right 9/17/2018    Procedure: HEART CATH-RIGHT;  Surgeon: Le Salmeron MD;  Location: [facility] CATH LAB;  Service: Cardiology;  Laterality: Right;    RIGHT HEART CATHETERIZATION Right 7/8/2019    Procedure: HEART CATH-RIGHT;  Surgeon: Le Salmeron MD;  Location: [facility] CATH LAB;  Service: Cardiology;  Laterality: Right;       Review of patient's allergies indicates:   Allergen Reactions    Penicillins Itching    Sulfa (sulfonamide antibiotics) Itching       Current Facility-Administered Medications   Medication    acetaminophen tablet 650 mg    albuterol-ipratropium 2.5 mg-0.5 mg/3 mL nebulizer solution 3 mL    budesonide-formoterol 160-4.5 mcg inhaler 2 puff    dextrose 10% (D10W) Bolus    fluticasone propionate 50 mcg/actuation nasal spray 50 mcg    glucagon (human recombinant) injection 1 mg    heparin (porcine) injection 5,000 Units    insulin aspart U-100 pen 1-10 Units    lactated ringers infusion    ondansetron disintegrating tablet 8 mg    sodium chloride 0.9% flush 10 mL     Family History     Problem Relation (Age of Onset)    Cancer Sister        Tobacco Use    Smoking status: Former Smoker     Packs/day: 0.25     Years: 15.00     Pack years: 3.75     Types: Cigarettes     Last attempt to quit: 11/2016     Years since quitting: 3.0    Smokeless tobacco: Never Used   Substance and Sexual Activity    Alcohol use: No    Drug use: No    Sexual activity: Not on file     Review of Systems   Constitutional: Negative.    HENT: Negative.    Eyes: Negative.    Respiratory: Negative.    Cardiovascular:  Negative.    Gastrointestinal: Positive for abdominal distention, abdominal pain, nausea and vomiting.   Endocrine: Negative.    Genitourinary: Negative.    Musculoskeletal: Negative.      Objective:     Vital Signs (Most Recent):  Temp: 98.8 °F (37.1 °C) (12/01/19 1139)  Pulse: 83 (12/01/19 1139)  Resp: 18 (12/01/19 1139)  BP: (!) 122/58 (12/01/19 1139)  SpO2: (!) 92 % (12/01/19 1139) Vital Signs (24h Range):  Temp:  [98.2 °F (36.8 °C)-99.1 °F (37.3 °C)] 98.8 °F (37.1 °C)  Pulse:  [77-92] 83  Resp:  [15-24] 18  SpO2:  [90 %-98 %] 92 %  BP: (101-162)/(52-74) 122/58     Patient Vitals for the past 72 hrs (Last 3 readings):   Weight   12/01/19 0021 92.5 kg (204 lb)   11/30/19 1544 93.4 kg (206 lb)     Body mass index is 32.93 kg/m².      Intake/Output Summary (Last 24 hours) at 12/1/2019 1323  Last data filed at 12/1/2019 0800  Gross per 24 hour   Intake 150 ml   Output 1050 ml   Net -900 ml       Physical Exam   Constitutional: She is oriented to person, place, and time. She appears well-developed and well-nourished.   HENT:   Head: Normocephalic.   Neck: Normal range of motion. JVD present.   Cardiovascular: Normal rate and regular rhythm.   Pulmonary/Chest: Effort normal and breath sounds normal.   Abdominal: Soft. Bowel sounds are absent. There is generalized tenderness.   Musculoskeletal: Normal range of motion. She exhibits no edema.   Neurological: She is alert and oriented to person, place, and time.   Skin: Skin is warm and dry. Capillary refill takes less than 2 seconds.       Significant Labs:  CBC:  Recent Labs   Lab 11/30/19  1610 12/01/19  0433   WBC 6.14 6.42   RBC 4.21 4.03   HGB 11.2* 10.5*   HCT 35.8* 34.8*    206   MCV 85 86   MCH 26.6* 26.1*   MCHC 31.3* 30.2*     BNP:  Recent Labs   Lab 11/30/19  1610   BNP 20     CMP:  Recent Labs   Lab 11/30/19  1610 12/01/19  0433   * 77   CALCIUM 9.8 9.2   ALBUMIN 4.2 3.7   PROT 7.7 6.8    145   K 4.8 5.0   CO2 25 29    108   BUN 23  26*   CREATININE 2.1* 2.0*   ALKPHOS 64 59   ALT 17 17   AST 14 18   BILITOT 0.6 0.5      Coagulation:   Recent Labs   Lab 12/01/19  0433   INR 1.0     LDH:  No results for input(s): LDH in the last 72 hours.  Microbiology:  Microbiology Results (last 7 days)     ** No results found for the last 168 hours. **          I have reviewed all pertinent labs within the past 24 hours.    Diagnostic Results:  I have reviewed all pertinent imaging results/findings within the past 24 hours.

## 2019-12-01 NOTE — HPI
68 y.o. 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 She  underwent a RHC that showed severe PH with normal PCWP and was started on Tyvaso. A RHC while on Tyvaso 12 breaths 4x/day, opsumit and adempas 2.0 tid she had severe PA pressures and Tyvaso was discontinued in favor of Uptravi on 7/8/19. Lives alone but her niece lives close by and she and her son keep an eye on her.    Admitted to the surgical team with acute onset abdominal pain, nausea and vomiting for 1 day prior to admission. CT showed small bowel obstruction and is NPO with NGT to low suction. On day of admission she only took her PH medications until noon. Denies any shortness of breath, cough, chest pain, lightheadedness. The cardiology team was consulted to address PH medication in the setting of NPO status.       TTE 4/2019  · Normal left ventricular systolic function. The estimated ejection fraction is 60%  · Mild right ventricular enlargement.  · Low normal right ventricular systolic function. TAPSE 2.2  · Normal LV diastolic function.  · Moderate right atrial enlargement.  · The estimated PA systolic pressure is 92 mm Hg  · Elevated central venous pressure (15 mm Hg).     RHC 9/18  RA: 16/16 (14)  RV: 65/4  RVEDP: 11   PW: 15/15 (16)  PA: 60/19 (37)  AO: 105/66 (79)  AO_SAT: 100  PA_SAT: 72    CONDITION 1 (9/17/2018 11:28:21):  BP: 110/65  HR: 78  FICKCI: 2.9400  FICKCO: 6.0500  PVR: 331.0000      TTE 2/9/18  The right ventricle is upper limit of normal measuring 4.2 cm at the base in the apical right ventricle-focused view. Global right ventricular systolic function appears low normal. Tricuspid annular plane systolic excursion (TAPSE) is   2.1 cm. Tissue Doppler-derived tricuspid annular peak systolic velocity (S prime) is 14.0 cm/s. The estimated PA systolic pressure  is 53 mmHg    1 - Normal left ventricular systolic function (EF 60-65%).     2 - Wall motion abnormalities.     3 - Biatrial enlargement.     4 - Impaired LV relaxation, elevated LAP (grade 2 diastolic dysfunction).     5 - Right ventricle is upper limit of normal in size with low normal systolic function.     6 - Trivial tricuspid regurgitation.     7 - Trivial pericardial effusion.     8 - Pulmonary hypertension. The estimated PA systolic pressure is 53 mmHg.    RHC 7/8/19:  · Severe Pulmonary hypertension (pt did not take any of her meds this am).  · Increased right and left-sided filling pressures.  · Normal Cardiac output and index by José method.  · RA: 22/ 15/ 17 RV: 90/ 10/ 20 PA: 90/ 30/ 50 PWP: 25/ 25/ 25 . Cardiac output was 6.94 by José. Cardiac index is 3.4 L/min/m2. O2 Sat: PA 69%. Pulmonary vascular resistance: 3.6 CARLIN.

## 2019-12-01 NOTE — ASSESSMENT & PLAN NOTE
Likely pre-renal in the setting of emesis, dehydration and diuresis at home  -She is on gentle IVF and diuretic on hold  -Avoid further nephrotoxins

## 2019-12-01 NOTE — PROGRESS NOTES
Pt transferred from [facility]. AAO. Ambulated from wheelchair to bed. VSS. Sats mid 90's on 3 L NC, O2 weaned down to 2L per pts request. Oriented to room and call bell. Fall education reviewed with pt. Instructed to call if assistance needed, verbalized understanding.     1425: Spoke to Dr. Joe about pt's arrival.

## 2019-12-01 NOTE — PROGRESS NOTES
Report received from RN. Pt transported to SICU 100*** with portable telemetryNasal Cannula. Pt connected to ICU monitor Wall [id number] Transplant service. Niece called and made aware of patient arrival. New orders received and implemented. Pt assessed, immediate needs met. Family brought to bedside, updated on the patient's current condition and PoC for remainder of shift. Family also given ICU Welcome packet and educated on visiting hours. All questions answered, emotional support provided.     Admit Skin Note: ***

## 2019-12-01 NOTE — CARE UPDATE
Brief Cardiology Consult Note    Consult received for home PH med mgmt in 68 y/o F (patient of Dr. Salmeron) admitted for SBO. Discussed with cardiology upper level and called on call surgical resident; patient hemodynamically stable, breathing comfortably on home 3L NC. Team is comfortable with formal cardiology consult tomorrow AM with [facility] staff (will change to [facility] consult at that time). Low threshold to contact us in the interim with any questions re PH mgmt or hemodynamic instability.      Adriel Teresa MD  PGY-4, Cardiology  Pager [phone]

## 2019-12-02 PROBLEM — I10 HYPERTENSION: Status: ACTIVE | Noted: 2019-12-02

## 2019-12-02 PROBLEM — I50.32 CHRONIC DIASTOLIC HEART FAILURE: Status: ACTIVE | Noted: 2019-12-02

## 2019-12-02 LAB
ALBUMIN SERPL BCP-MCNC: 3.7 G/DL (ref 3.5–5.2)
ALP SERPL-CCNC: 59 U/L (ref 55–135)
ALT SERPL W/O P-5'-P-CCNC: 21 U/L (ref 10–44)
ANION GAP SERPL CALC-SCNC: 8 MMOL/L (ref 8–16)
AST SERPL-CCNC: 28 U/L (ref 10–40)
BASOPHILS # BLD AUTO: 0.02 K/UL (ref 0–0.2)
BASOPHILS NFR BLD: 0.4 % (ref 0–1.9)
BILIRUB SERPL-MCNC: 0.6 MG/DL (ref 0.1–1)
BUN SERPL-MCNC: 24 MG/DL (ref 8–23)
CALCIUM SERPL-MCNC: 9.1 MG/DL (ref 8.7–10.5)
CHLORIDE SERPL-SCNC: 108 MMOL/L (ref 95–110)
CO2 SERPL-SCNC: 28 MMOL/L (ref 23–29)
CREAT SERPL-MCNC: 1.6 MG/DL (ref 0.5–1.4)
DIFFERENTIAL METHOD: ABNORMAL
EOSINOPHIL # BLD AUTO: 0.1 K/UL (ref 0–0.5)
EOSINOPHIL NFR BLD: 1.4 % (ref 0–8)
ERYTHROCYTE [DISTWIDTH] IN BLOOD BY AUTOMATED COUNT: 19 % (ref 11.5–14.5)
EST. GFR  (AFRICAN AMERICAN): 37.9 ML/MIN/1.73 M^2
EST. GFR  (NON AFRICAN AMERICAN): 32.9 ML/MIN/1.73 M^2
GLUCOSE SERPL-MCNC: 66 MG/DL (ref 70–110)
HCT VFR BLD AUTO: 34.5 % (ref 37–48.5)
HGB BLD-MCNC: 10.4 G/DL (ref 12–16)
IMM GRANULOCYTES # BLD AUTO: 0.02 K/UL (ref 0–0.04)
IMM GRANULOCYTES NFR BLD AUTO: 0.4 % (ref 0–0.5)
INR PPP: 1 (ref 0.8–1.2)
LYMPHOCYTES # BLD AUTO: 0.7 K/UL (ref 1–4.8)
LYMPHOCYTES NFR BLD: 14.7 % (ref 18–48)
MAGNESIUM SERPL-MCNC: 2.8 MG/DL (ref 1.6–2.6)
MCH RBC QN AUTO: 26.3 PG (ref 27–31)
MCHC RBC AUTO-ENTMCNC: 30.1 G/DL (ref 32–36)
MCV RBC AUTO: 87 FL (ref 82–98)
METHEMOGLOBIN: 0.5 % (ref 0–3)
MONOCYTES # BLD AUTO: 0.4 K/UL (ref 0.3–1)
MONOCYTES NFR BLD: 7.9 % (ref 4–15)
NEUTROPHILS # BLD AUTO: 3.8 K/UL (ref 1.8–7.7)
NEUTROPHILS NFR BLD: 75.2 % (ref 38–73)
NRBC BLD-RTO: 0 /100 WBC
PHOSPHATE SERPL-MCNC: 4.2 MG/DL (ref 2.7–4.5)
PLATELET # BLD AUTO: 186 K/UL (ref 150–350)
PMV BLD AUTO: 9 FL (ref 9.2–12.9)
POCT GLUCOSE: 57 MG/DL (ref 70–110)
POCT GLUCOSE: 58 MG/DL (ref 70–110)
POCT GLUCOSE: 72 MG/DL (ref 70–110)
POCT GLUCOSE: 73 MG/DL (ref 70–110)
POCT GLUCOSE: 82 MG/DL (ref 70–110)
POCT GLUCOSE: 86 MG/DL (ref 70–110)
POCT GLUCOSE: 92 MG/DL (ref 70–110)
POTASSIUM SERPL-SCNC: 4.6 MMOL/L (ref 3.5–5.1)
PROT SERPL-MCNC: 6.8 G/DL (ref 6–8.4)
PROTHROMBIN TIME: 10.4 SEC (ref 9–12.5)
RBC # BLD AUTO: 3.95 M/UL (ref 4–5.4)
SODIUM SERPL-SCNC: 144 MMOL/L (ref 136–145)
WBC # BLD AUTO: 5.04 K/UL (ref 3.9–12.7)

## 2019-12-02 PROCEDURE — 27000221 HC OXYGEN, UP TO 24 HOURS

## 2019-12-02 PROCEDURE — 25000003 PHARM REV CODE 250: Performed by: INTERNAL MEDICINE

## 2019-12-02 PROCEDURE — 80053 COMPREHEN METABOLIC PANEL: CPT

## 2019-12-02 PROCEDURE — 63600175 PHARM REV CODE 636 W HCPCS: Performed by: PHYSICIAN ASSISTANT

## 2019-12-02 PROCEDURE — 99233 SBSQ HOSP IP/OBS HIGH 50: CPT | Mod: ,,, | Performed by: INTERNAL MEDICINE

## 2019-12-02 PROCEDURE — 85025 COMPLETE CBC W/AUTO DIFF WBC: CPT

## 2019-12-02 PROCEDURE — 25000242 PHARM REV CODE 250 ALT 637 W/ HCPCS: Performed by: INTERNAL MEDICINE

## 2019-12-02 PROCEDURE — 25000003 PHARM REV CODE 250: Performed by: STUDENT IN AN ORGANIZED HEALTH CARE EDUCATION/TRAINING PROGRAM

## 2019-12-02 PROCEDURE — 94761 N-INVAS EAR/PLS OXIMETRY MLT: CPT

## 2019-12-02 PROCEDURE — 99900035 HC TECH TIME PER 15 MIN (STAT)

## 2019-12-02 PROCEDURE — 83735 ASSAY OF MAGNESIUM: CPT

## 2019-12-02 PROCEDURE — 99291 CRITICAL CARE FIRST HOUR: CPT | Mod: ,,, | Performed by: PHYSICIAN ASSISTANT

## 2019-12-02 PROCEDURE — 85610 PROTHROMBIN TIME: CPT

## 2019-12-02 PROCEDURE — 25000003 PHARM REV CODE 250: Performed by: PHYSICIAN ASSISTANT

## 2019-12-02 PROCEDURE — 94640 AIRWAY INHALATION TREATMENT: CPT

## 2019-12-02 PROCEDURE — 96372 THER/PROPH/DIAG INJ SC/IM: CPT | Performed by: EMERGENCY MEDICINE

## 2019-12-02 PROCEDURE — 63600367 HC NITRIC OXIDE PER HOUR

## 2019-12-02 PROCEDURE — 20000000 HC ICU ROOM

## 2019-12-02 PROCEDURE — 84100 ASSAY OF PHOSPHORUS: CPT

## 2019-12-02 PROCEDURE — 99291 PR CRITICAL CARE, E/M 30-74 MINUTES: ICD-10-PCS | Mod: ,,, | Performed by: PHYSICIAN ASSISTANT

## 2019-12-02 PROCEDURE — 99233 PR SUBSEQUENT HOSPITAL CARE,LEVL III: ICD-10-PCS | Mod: ,,, | Performed by: INTERNAL MEDICINE

## 2019-12-02 PROCEDURE — 25000242 PHARM REV CODE 250 ALT 637 W/ HCPCS: Performed by: PHYSICIAN ASSISTANT

## 2019-12-02 PROCEDURE — 96376 TX/PRO/DX INJ SAME DRUG ADON: CPT | Performed by: EMERGENCY MEDICINE

## 2019-12-02 PROCEDURE — 63600175 PHARM REV CODE 636 W HCPCS: Performed by: INTERNAL MEDICINE

## 2019-12-02 RX ORDER — PANTOPRAZOLE SODIUM 40 MG/1
40 TABLET, DELAYED RELEASE ORAL DAILY
Status: DISCONTINUED | OUTPATIENT
Start: 2019-12-02 | End: 2019-12-04 | Stop reason: HOSPADM

## 2019-12-02 RX ORDER — BUMETANIDE 1 MG/1
1 TABLET ORAL ONCE
Status: COMPLETED | OUTPATIENT
Start: 2019-12-02 | End: 2019-12-02

## 2019-12-02 RX ORDER — IRBESARTAN 150 MG/1
150 TABLET ORAL DAILY
Status: DISCONTINUED | OUTPATIENT
Start: 2019-12-02 | End: 2019-12-04 | Stop reason: HOSPADM

## 2019-12-02 RX ADMIN — IPRATROPIUM BROMIDE AND ALBUTEROL SULFATE 3 ML: .5; 3 SOLUTION RESPIRATORY (INHALATION) at 07:12

## 2019-12-02 RX ADMIN — HEPARIN SODIUM 5000 UNITS: 5000 INJECTION, SOLUTION INTRAVENOUS; SUBCUTANEOUS at 09:12

## 2019-12-02 RX ADMIN — BUDESONIDE AND FORMOTEROL FUMARATE DIHYDRATE 2 PUFF: 160; 4.5 AEROSOL RESPIRATORY (INHALATION) at 07:12

## 2019-12-02 RX ADMIN — BUDESONIDE AND FORMOTEROL FUMARATE DIHYDRATE 2 PUFF: 160; 4.5 AEROSOL RESPIRATORY (INHALATION) at 09:12

## 2019-12-02 RX ADMIN — DEXTROSE 125 ML: 10 SOLUTION INTRAVENOUS at 05:12

## 2019-12-02 RX ADMIN — ACETAMINOPHEN 650 MG: 325 TABLET ORAL at 10:12

## 2019-12-02 RX ADMIN — HEPARIN SODIUM 5000 UNITS: 5000 INJECTION, SOLUTION INTRAVENOUS; SUBCUTANEOUS at 05:12

## 2019-12-02 RX ADMIN — RIOCIGUAT 2.5 MG: 2.5 TABLET, FILM COATED ORAL at 09:12

## 2019-12-02 RX ADMIN — IPRATROPIUM BROMIDE AND ALBUTEROL SULFATE 3 ML: .5; 3 SOLUTION RESPIRATORY (INHALATION) at 02:12

## 2019-12-02 RX ADMIN — IPRATROPIUM BROMIDE AND ALBUTEROL SULFATE 3 ML: .5; 3 SOLUTION RESPIRATORY (INHALATION) at 05:12

## 2019-12-02 RX ADMIN — HEPARIN SODIUM 5000 UNITS: 5000 INJECTION, SOLUTION INTRAVENOUS; SUBCUTANEOUS at 03:12

## 2019-12-02 RX ADMIN — IRBESARTAN 150 MG: 150 TABLET ORAL at 09:12

## 2019-12-02 RX ADMIN — IPRATROPIUM BROMIDE AND ALBUTEROL SULFATE 3 ML: .5; 3 SOLUTION RESPIRATORY (INHALATION) at 12:12

## 2019-12-02 RX ADMIN — PANTOPRAZOLE SODIUM 40 MG: 40 TABLET, DELAYED RELEASE ORAL at 09:12

## 2019-12-02 RX ADMIN — BUMETANIDE 1 MG: 1 TABLET ORAL at 09:12

## 2019-12-02 RX ADMIN — MACITENTAN 10 MG: 10 TABLET, FILM COATED ORAL at 09:12

## 2019-12-02 RX ADMIN — FLUTICASONE PROPIONATE 50 MCG: 50 SPRAY, METERED NASAL at 09:12

## 2019-12-02 NOTE — ASSESSMENT & PLAN NOTE
-BP slightly elevated this am.  Restarted home dose of Irbesartan and PH meds.  Will monitor response.

## 2019-12-02 NOTE — ASSESSMENT & PLAN NOTE
-Last 2D Echo 4/15/19: LVEF 60%, LVEDD 5.3 cm  -Euvolemic on examination today  -Was on Bumex at home, holding again today as patient euvolemic and oral intake is low.  Just advanced diet to clear liquids   -GDMT with Irbesartan  -2g Na dietary restriction, 1500 mL fluid restriction, strict I/Os

## 2019-12-02 NOTE — PROGRESS NOTES
Admit Note     Met with patient to assess needs. Patient is a 69 y.o.  female, admitted for epigastric abdominal pain, CHF and pulmonary hypertension.     Patient admitted to [facility]  on 11/30/2019 .  At this time, patient presents as alert and oriented x 4, good eye contact, calm and communicative.  At this time, patients caregiver is not in attendance. Pt reports her brother should be in house soon to visit.     Household/Family Systems     Patient resides with patient's God daughter, Viridiana and her three children ( ages 6,8 and 12) at     [address] [place] [id number].      Support system includes God daughter and brother.    Patient does not have dependents that are need of being cared for.   The pt does not have any children.      Patients primary caregiver is self with assistance from Viridiana as needed.     Pt's cell:  [phone]    Emergency contacts:   Viridiana cell: [phone]  Elijah Corey (brother, lives in [place], retired and drives) [phone]    During admission, patient's caregiver plans to visit.   Confirmed patient and patients caregivers do have access to reliable transportation.  The pt does not drive,however her brother and God-daughter drive.     Cognitive Status/Learning     Patient reports reading ability as 8th grade and states patient does not have difficulty with writing, hearing, comprehension, learning and memory.   Pt reports difficulty with her left sided eye sight.  Pt had cataract removed from the right eye, and can see/read with the assistance of reading glasses.  Patient reports patient learns best by one on one.     Needed: No.   Highest education level: Grade School (0-8)    Vocation/Disability   .  Working for Income: No  If no, reason not working: Disability  Patient has been disabled since 2009 due to work related injuries.     Adherence     Patient reports a high level of adherence to patients health care regimen.  Pt's God-daughter  helps with medication management.  Adherence counseling and education provided. Patient verbalizes understanding.    Substance Use    Patient reports the following substance usage.    Tobacco: Pt reports she last used tobacco 2 weeks ago.  Pt reports she doesn't inhale the tobacco/ smoke. Pt still plans on quitting.   Alcohol: pt reports no alcohol use since age 46  Illicit Drugs/Non-prescribed Medications: none, patient denies any use.  Patient states clear understanding of the potential impact of substance use.  Substance abstinence/cessation counseling, education and resources provided and reviewed.     Services Utilizing/ADLS    Infusion Service: Prior to admission, patient utilizing? no  Home Health: Prior to admission, patient utilizing? no   Pt has used HH in 2015 with [facility].  DME: Prior to admission, yes   Pt has a Bipap machine, BSC, walker, shower chair and home 02, concentrator and portables at 2LPM.  Pt's portable 02 is not in the pt's room.    Princess is the pt's 02 provider.   Pulmonary/Cardiac Rehab: Prior to admission, no  Dialysis:  Prior to admission, no  Transplant Specialty Pharmacy:  Prior to admission, no.    Prior to admission, patient reports patient was mostly  independent with ADLS and was not driving.  Patient reports patient is not able to care for self at this time due to compromised medical condition (as documented in medical record) and physical weakness..  Patient indicates a willingness to care for self once medically cleared to do so.    Insurance/Medications    Insured by   Payor/Plan Subscr  Sex Relation Sub. Ins. ID Effective Group Num   1. [employer] MANAGE* ZULEMA LEONARD 1950 Female  [id number] 9/1/16 [id number]                                   [address] [address]      Primary Insurance (for UNOS reporting): Public Insurance - Medicare FFS (Fee For Service)  Secondary Insurance (for UNOS reporting): None    Patient reports patient is able to obtain and afford medications at  this time and at time of discharge.    Living Will/Healthcare Power of     Patient states patient has a LW and/or HCPA.  Pt reports Viridiana is her HCPA.  provided education regarding LW and HCPA and the completion of forms.    Coping/Mental Health    Patient is coping adequately with the aid of  family members and yuliet.   Patient denies mental health difficulties.   Pt reports she has a very strong yuliet and does not have difficulty of anxiety or depression.   Worker provided general support given pt's hospital admission.     Discharge Planning    At time of discharge, patient plans to return to patient's home under the care of self and God-daughter.  Patients family will transport patient.  Per rounds today, expected discharge date has not been medically determined at this time. Patient and patients caregiver  verbalize understanding and are involved in treatment planning and discharge process.    Additional Concerns    Patient is being followed for needs, education, resources, information, emotional support, supportive counseling, and for supportive and skilled discharge plan of care.  providing ongoing psychosocial support, education, resources and d/c planning as needed.  SW remains available. Patient denies additional needs and/or concerns at this time. Patient verbalizes understanding and agreement with information reviewed, social work availability, and how to access available resources as needed.

## 2019-12-02 NOTE — PROGRESS NOTES
Patient seen and examined. Up in chair. Feeling well. No n/v. Had 4 BMs overnight. gastrograffin made it to the colon. Moved to the ICU for Moy. Heart transplant okay to take over as primary    Vitals:    12/02/19 0600   BP: 122/62   Pulse: 83   Resp: (!) 31   Temp:          Abdomen: Soft, ND, NT       Plan:  - d/c NG tube.   - okay for oral meds and clears  - will continue to follow.   - appreciate [facility] for rest of care      Nathan Fontenot, PGY-5  General Surgery  [phone]

## 2019-12-02 NOTE — ASSESSMENT & PLAN NOTE
-PAH (WHO group 1) with most recent [facility] 7/2019 - RA: 22/ 15/ 17 RV: 90/ 10/ 20 PA: 90/ 30/ 50 PWP: 25/ 25/ 25.  -In the setting of severe PH we would prefer to not withhold therapy  -Was transferred to ICU for Moy as she was unable to take po PH meds  -Weaned Moy to 10 this am and administered PH meds.  Will wean to 5 this afternoon and hopefully off this evening.  -Continue home therapy: Adempas 2.5, Uptravi 1600 mcg, Opsumit 10 mg

## 2019-12-02 NOTE — ASSESSMENT & PLAN NOTE
-Initial Conservative management with bowel rest and NGT  -Gastrograffin study done and gastrograffin made it to the colon  -NGT removed as per general surgery  -ok for oral meds and clears.  Will advance diet per surgery recommendations  -Appreciate General surgery consultation and assistance

## 2019-12-02 NOTE — ASSESSMENT & PLAN NOTE
-Likely pre-renal in the setting of emesis, dehydration and diuresis at home  -She was given genntle IVF and diuretic on hold.  Renal function better  -Avoid further nephrotoxins

## 2019-12-02 NOTE — PLAN OF CARE
Transferred to ICU for Moy in setting of strict NPO for SBO  Pending result from gastrografin study   Gen.surg to comment on when patient can be started on PO medications    -She is short of breath on Moy at 10 ppm and will increase to 20 ppm

## 2019-12-02 NOTE — SUBJECTIVE & OBJECTIVE
Interval History: Patient was placed on Moy due to inability to take oral pulm HTN meds.  She did well overnight.  She had 4 BM and gastrograffin made it to the colon; therefore, surgery cleared patient for diet and NG tube removal.  Patient reports feeling fine with no new complaints.  Oral Adempas, Uptravi and Opsumit ordered.  Waiting for patients family to bring Uptravi supply in.     Continuous Infusions:   nitric oxide gas       Scheduled Meds:   budesonide-formoterol 160-4.5 mcg  2 puff Inhalation Q12H    fluticasone propionate  1 spray Each Nostril Daily    heparin (porcine)  5,000 Units Subcutaneous Q8H    irbesartan  150 mg Oral Daily    macitentan  10 mg Oral Daily    pantoprazole  40 mg Oral Daily    riociguat  2.5 mg Oral TID    selexipag  1,600 mcg Oral BID     PRN Meds:acetaminophen, albuterol-ipratropium, Dextrose 10% Bolus, glucagon (human recombinant), insulin aspart U-100, ondansetron, sodium chloride 0.9%    Review of patient's allergies indicates:   Allergen Reactions    Penicillins Itching    Sulfa (sulfonamide antibiotics) Itching     Objective:     Vital Signs (Most Recent):  Temp: 98.4 °F (36.9 °C) (12/02/19 0700)  Pulse: 78 (12/02/19 1000)  Resp: (!) 25 (12/02/19 1000)  BP: (!) 149/81 (12/02/19 1000)  SpO2: (!) 93 % (12/02/19 1000) Vital Signs (24h Range):  Temp:  [97.8 °F (36.6 °C)-98.8 °F (37.1 °C)] 98.4 °F (36.9 °C)  Pulse:  [75-84] 78  Resp:  [16-40] 25  SpO2:  [89 %-99 %] 93 %  BP: (113-163)/(57-81) 149/81     Patient Vitals for the past 72 hrs (Last 3 readings):   Weight   12/01/19 0021 92.5 kg (204 lb)   11/30/19 1544 93.4 kg (206 lb)     Body mass index is 32.93 kg/m².      Intake/Output Summary (Last 24 hours) at 12/2/2019 1128  Last data filed at 12/2/2019 1000  Gross per 24 hour   Intake 1029 ml   Output --   Net 1029 ml       Hemodynamic Parameters:       Telemetry: reviewed  Physical Exam  Constitutional: sitting in chair NAD  Neck: Normal range of motion. No JVD  elevation; neck veins appear just above the clavicle with patient sitting in chair.   Cardiovascular: Normal rate and regular rhythm.   Pulmonary/Chest: Effort normal and breath sounds normal.   Abdominal: Soft. Bowel sounds are are active. Tenderness has improved.   Musculoskeletal: Normal range of motion. She exhibits no edema.   Neurological: She is alert and oriented to person, place, and time.   Skin: Skin is warm and dry. Capillary refill takes less than 2 seconds.       Significant Labs:  CBC:  Recent Labs   Lab 11/30/19  1610 12/01/19 0433 12/02/19  0250   WBC 6.14 6.42 5.04   RBC 4.21 4.03 3.95*   HGB 11.2* 10.5* 10.4*   HCT 35.8* 34.8* 34.5*    206 186   MCV 85 86 87   MCH 26.6* 26.1* 26.3*   MCHC 31.3* 30.2* 30.1*     BNP:  Recent Labs   Lab 11/30/19  1610   BNP 20     CMP:  Recent Labs   Lab 11/30/19  1610 12/01/19 0433 12/02/19  0250   * 77 66*   CALCIUM 9.8 9.2 9.1   ALBUMIN 4.2 3.7 3.7   PROT 7.7 6.8 6.8    145 144   K 4.8 5.0 4.6   CO2 25 29 28    108 108   BUN 23 26* 24*   CREATININE 2.1* 2.0* 1.6*   ALKPHOS 64 59 59   ALT 17 17 21   AST 14 18 28   BILITOT 0.6 0.5 0.6      Coagulation:   Recent Labs   Lab 12/01/19 0433 12/02/19  0250   INR 1.0 1.0     LDH:  No results for input(s): LDH in the last 72 hours.  Microbiology:  Microbiology Results (last 7 days)     ** No results found for the last 168 hours. **          I have reviewed all pertinent labs within the past 24 hours.    Estimated Creatinine Clearance: 38.6 mL/min (A) (based on SCr of 1.6 mg/dL (H)).    Diagnostic Results:  I have reviewed all pertinent imaging results/findings within the past 24 hours.

## 2019-12-03 LAB
ALBUMIN SERPL BCP-MCNC: 3.3 G/DL (ref 3.5–5.2)
ALP SERPL-CCNC: 59 U/L (ref 55–135)
ALT SERPL W/O P-5'-P-CCNC: 21 U/L (ref 10–44)
ANION GAP SERPL CALC-SCNC: 7 MMOL/L (ref 8–16)
AST SERPL-CCNC: 22 U/L (ref 10–40)
BASOPHILS # BLD AUTO: 0.02 K/UL (ref 0–0.2)
BASOPHILS NFR BLD: 0.4 % (ref 0–1.9)
BILIRUB SERPL-MCNC: 0.7 MG/DL (ref 0.1–1)
BUN SERPL-MCNC: 20 MG/DL (ref 8–23)
CALCIUM SERPL-MCNC: 8.4 MG/DL (ref 8.7–10.5)
CHLORIDE SERPL-SCNC: 103 MMOL/L (ref 95–110)
CO2 SERPL-SCNC: 29 MMOL/L (ref 23–29)
CREAT SERPL-MCNC: 1.5 MG/DL (ref 0.5–1.4)
DIFFERENTIAL METHOD: ABNORMAL
EOSINOPHIL # BLD AUTO: 0.1 K/UL (ref 0–0.5)
EOSINOPHIL NFR BLD: 1.1 % (ref 0–8)
ERYTHROCYTE [DISTWIDTH] IN BLOOD BY AUTOMATED COUNT: 18.6 % (ref 11.5–14.5)
EST. GFR  (AFRICAN AMERICAN): 41 ML/MIN/1.73 M^2
EST. GFR  (NON AFRICAN AMERICAN): 35.5 ML/MIN/1.73 M^2
GLUCOSE SERPL-MCNC: 83 MG/DL (ref 70–110)
HCT VFR BLD AUTO: 34.8 % (ref 37–48.5)
HGB BLD-MCNC: 10.6 G/DL (ref 12–16)
IMM GRANULOCYTES # BLD AUTO: 0.02 K/UL (ref 0–0.04)
IMM GRANULOCYTES NFR BLD AUTO: 0.4 % (ref 0–0.5)
INR PPP: 0.9 (ref 0.8–1.2)
LYMPHOCYTES # BLD AUTO: 0.7 K/UL (ref 1–4.8)
LYMPHOCYTES NFR BLD: 12.3 % (ref 18–48)
MAGNESIUM SERPL-MCNC: 2.2 MG/DL (ref 1.6–2.6)
MCH RBC QN AUTO: 26.3 PG (ref 27–31)
MCHC RBC AUTO-ENTMCNC: 30.5 G/DL (ref 32–36)
MCV RBC AUTO: 86 FL (ref 82–98)
METHEMOGLOBIN: 0.5 % (ref 0–3)
MONOCYTES # BLD AUTO: 0.5 K/UL (ref 0.3–1)
MONOCYTES NFR BLD: 8.8 % (ref 4–15)
NEUTROPHILS # BLD AUTO: 4.1 K/UL (ref 1.8–7.7)
NEUTROPHILS NFR BLD: 77 % (ref 38–73)
NRBC BLD-RTO: 0 /100 WBC
PHOSPHATE SERPL-MCNC: 3.7 MG/DL (ref 2.7–4.5)
PLATELET # BLD AUTO: 173 K/UL (ref 150–350)
PMV BLD AUTO: 8.8 FL (ref 9.2–12.9)
POCT GLUCOSE: 134 MG/DL (ref 70–110)
POCT GLUCOSE: 76 MG/DL (ref 70–110)
POCT GLUCOSE: 80 MG/DL (ref 70–110)
POCT GLUCOSE: 91 MG/DL (ref 70–110)
POTASSIUM SERPL-SCNC: 4 MMOL/L (ref 3.5–5.1)
PROT SERPL-MCNC: 6.2 G/DL (ref 6–8.4)
PROTHROMBIN TIME: 9.8 SEC (ref 9–12.5)
RBC # BLD AUTO: 4.03 M/UL (ref 4–5.4)
SODIUM SERPL-SCNC: 139 MMOL/L (ref 136–145)
WBC # BLD AUTO: 5.37 K/UL (ref 3.9–12.7)

## 2019-12-03 PROCEDURE — 25000003 PHARM REV CODE 250: Performed by: PHYSICIAN ASSISTANT

## 2019-12-03 PROCEDURE — 99900035 HC TECH TIME PER 15 MIN (STAT)

## 2019-12-03 PROCEDURE — 83735 ASSAY OF MAGNESIUM: CPT

## 2019-12-03 PROCEDURE — 94640 AIRWAY INHALATION TREATMENT: CPT

## 2019-12-03 PROCEDURE — 80053 COMPREHEN METABOLIC PANEL: CPT

## 2019-12-03 PROCEDURE — 84100 ASSAY OF PHOSPHORUS: CPT

## 2019-12-03 PROCEDURE — 94761 N-INVAS EAR/PLS OXIMETRY MLT: CPT

## 2019-12-03 PROCEDURE — 25000242 PHARM REV CODE 250 ALT 637 W/ HCPCS: Performed by: INTERNAL MEDICINE

## 2019-12-03 PROCEDURE — 85025 COMPLETE CBC W/AUTO DIFF WBC: CPT

## 2019-12-03 PROCEDURE — 63600175 PHARM REV CODE 636 W HCPCS: Performed by: INTERNAL MEDICINE

## 2019-12-03 PROCEDURE — 25000242 PHARM REV CODE 250 ALT 637 W/ HCPCS: Performed by: PHYSICIAN ASSISTANT

## 2019-12-03 PROCEDURE — 83050 HGB METHEMOGLOBIN QUAN: CPT

## 2019-12-03 PROCEDURE — 99233 PR SUBSEQUENT HOSPITAL CARE,LEVL III: ICD-10-PCS | Mod: ,,, | Performed by: INTERNAL MEDICINE

## 2019-12-03 PROCEDURE — 27000221 HC OXYGEN, UP TO 24 HOURS

## 2019-12-03 PROCEDURE — 63600367 HC NITRIC OXIDE PER HOUR

## 2019-12-03 PROCEDURE — 85610 PROTHROMBIN TIME: CPT

## 2019-12-03 PROCEDURE — 20600001 HC STEP DOWN PRIVATE ROOM

## 2019-12-03 PROCEDURE — 99233 SBSQ HOSP IP/OBS HIGH 50: CPT | Mod: ,,, | Performed by: INTERNAL MEDICINE

## 2019-12-03 PROCEDURE — 63600175 PHARM REV CODE 636 W HCPCS: Performed by: PHYSICIAN ASSISTANT

## 2019-12-03 RX ORDER — IPRATROPIUM BROMIDE AND ALBUTEROL SULFATE 2.5; .5 MG/3ML; MG/3ML
3 SOLUTION RESPIRATORY (INHALATION)
Status: DISCONTINUED | OUTPATIENT
Start: 2019-12-03 | End: 2019-12-04 | Stop reason: HOSPADM

## 2019-12-03 RX ORDER — BUMETANIDE 1 MG/1
1 TABLET ORAL DAILY
Status: DISCONTINUED | OUTPATIENT
Start: 2019-12-03 | End: 2019-12-04 | Stop reason: HOSPADM

## 2019-12-03 RX ORDER — IPRATROPIUM BROMIDE AND ALBUTEROL SULFATE 2.5; .5 MG/3ML; MG/3ML
3 SOLUTION RESPIRATORY (INHALATION) EVERY 6 HOURS PRN
Status: DISCONTINUED | OUTPATIENT
Start: 2019-12-03 | End: 2019-12-04 | Stop reason: HOSPADM

## 2019-12-03 RX ADMIN — FLUTICASONE PROPIONATE 50 MCG: 50 SPRAY, METERED NASAL at 08:12

## 2019-12-03 RX ADMIN — RIOCIGUAT 2.5 MG: 2.5 TABLET, FILM COATED ORAL at 01:12

## 2019-12-03 RX ADMIN — IPRATROPIUM BROMIDE AND ALBUTEROL SULFATE 3 ML: .5; 3 SOLUTION RESPIRATORY (INHALATION) at 08:12

## 2019-12-03 RX ADMIN — HEPARIN SODIUM 5000 UNITS: 5000 INJECTION, SOLUTION INTRAVENOUS; SUBCUTANEOUS at 08:12

## 2019-12-03 RX ADMIN — RIOCIGUAT 2.5 MG: 2.5 TABLET, FILM COATED ORAL at 08:12

## 2019-12-03 RX ADMIN — HEPARIN SODIUM 5000 UNITS: 5000 INJECTION, SOLUTION INTRAVENOUS; SUBCUTANEOUS at 01:12

## 2019-12-03 RX ADMIN — IRBESARTAN 150 MG: 150 TABLET ORAL at 08:12

## 2019-12-03 RX ADMIN — PANTOPRAZOLE SODIUM 40 MG: 40 TABLET, DELAYED RELEASE ORAL at 08:12

## 2019-12-03 RX ADMIN — BUDESONIDE AND FORMOTEROL FUMARATE DIHYDRATE 2 PUFF: 160; 4.5 AEROSOL RESPIRATORY (INHALATION) at 08:12

## 2019-12-03 RX ADMIN — IPRATROPIUM BROMIDE AND ALBUTEROL SULFATE 3 ML: .5; 3 SOLUTION RESPIRATORY (INHALATION) at 12:12

## 2019-12-03 RX ADMIN — MACITENTAN 10 MG: 10 TABLET, FILM COATED ORAL at 08:12

## 2019-12-03 RX ADMIN — IPRATROPIUM BROMIDE AND ALBUTEROL SULFATE 3 ML: .5; 3 SOLUTION RESPIRATORY (INHALATION) at 02:12

## 2019-12-03 RX ADMIN — BUMETANIDE 1 MG: 1 TABLET ORAL at 01:12

## 2019-12-03 RX ADMIN — HEPARIN SODIUM 5000 UNITS: 5000 INJECTION, SOLUTION INTRAVENOUS; SUBCUTANEOUS at 06:12

## 2019-12-03 NOTE — PLAN OF CARE
POC reviewed with pt at 0500. Pt verbalized understanding. Questions and concerns addressed. No acute events overnight. Pt progressing toward goals. Will continue to monitor. See flowsheets for full assessment and VS info    Patient was placed on 9 L High Flow Nasal Canula for low O 2 Sats. Per MD order.

## 2019-12-03 NOTE — PROGRESS NOTES
[facility]  General Surgery  Progress Note    Subjective:     History of Present Illness:  No notes on file    Post-Op Info:  * No surgery found *         Interval History: SIL overnight, continues to tolerate CLD without issue. + BMs. No significant pain    Medications:  Continuous Infusions:   nitric oxide gas       Scheduled Meds:   budesonide-formoterol 160-4.5 mcg  2 puff Inhalation Q12H    fluticasone propionate  1 spray Each Nostril Daily    heparin (porcine)  5,000 Units Subcutaneous Q8H    irbesartan  150 mg Oral Daily    macitentan  10 mg Oral Daily    pantoprazole  40 mg Oral Daily    riociguat  2.5 mg Oral TID    selexipag  1,600 mcg Oral BID     PRN Meds:acetaminophen, albuterol-ipratropium, Dextrose 10% Bolus, glucagon (human recombinant), insulin aspart U-100, ondansetron, sodium chloride, sodium chloride 0.9%     Review of patient's allergies indicates:   Allergen Reactions    Penicillins Itching    Sulfa (sulfonamide antibiotics) Itching     Objective:     Vital Signs (Most Recent):  Temp: 98.5 °F (36.9 °C) (12/03/19 0701)  Pulse: 79 (12/03/19 0815)  Resp: (!) 21 (12/03/19 0815)  BP: 106/62 (12/03/19 0800)  SpO2: 98 % (12/03/19 0815) Vital Signs (24h Range):  Temp:  [98.4 °F (36.9 °C)-99.7 °F (37.6 °C)] 98.5 °F (36.9 °C)  Pulse:  [74-90] 79  Resp:  [20-52] 21  SpO2:  [89 %-98 %] 98 %  BP: ()/(49-94) 106/62     Weight: 92.5 kg (204 lb)  Body mass index is 32.93 kg/m².    Intake/Output - Last 3 Shifts       12/01 0700 - 12/02 0659 12/02 0700 - 12/03 0659 12/03 0700 - 12/04 0659    P.O. 0 890     I.V. (mL/kg) 379 (4.1)      NG/      IV Piggyback 250      Total Intake(mL/kg) 779 (8.4) 890 (9.6)     Urine (mL/kg/hr) 0 (0)      Drains 350      Total Output 350      Net +429 +890            Urine Occurrence 5 x 8 x     Stool Occurrence 4 x 4 x     Emesis Occurrence  0 x           Physical Exam  Gen: NAD, sleeping comfortably but easily arousable  Abd: soft,  NT/ND  Ext: WWP    Significant Labs:  CBC:   Recent Labs   Lab 12/03/19  0324   WBC 5.37   RBC 4.03   HGB 10.6*   HCT 34.8*      MCV 86   MCH 26.3*   MCHC 30.5*     CMP:   Recent Labs   Lab 12/03/19  0324   GLU 83   CALCIUM 8.4*   ALBUMIN 3.3*   PROT 6.2      K 4.0   CO2 29      BUN 20   CREATININE 1.5*   ALKPHOS 59   ALT 21   AST 22   BILITOT 0.7       Significant Diagnostics:  I have reviewed all pertinent imaging results/findings within the past 24 hours.    Assessment/Plan:     Epigastric abdominal pain  70 yo F presented with epigastric abdominal pain, found to have partial small bowel obstruction on imaging. Admitted for NGT decompression and management. Now with ROBF, tolerating CLD    - OK to advance to regular diet from a general surgery perspective  - No indication for surgical intervention at this time  - appreciate [facility] for rest of care        Geovanny Renner MD  General Surgery  [facility]

## 2019-12-03 NOTE — NURSING
Patient arrived on unit from SICU in wheelchair per RN. Patient awake, alert, and oriented. Patient transferred to recliner chair at bedside. Denies pain but requesting PRN neb treatment. Respiratory notified and stated she will be here shortly to adminster treatment. Patient notified. Denies other questions. Will continue to monitor.

## 2019-12-03 NOTE — PROGRESS NOTES
[facility]  Heart Transplant  Progress Note    Patient Name: Bessie Davis  MRN: [id number]  Admission Date: 11/30/2019  Hospital Length of Stay: 1 days  Attending Physician: Elenita Garcia MD  Primary Care Provider: Priscilla Frankel MD  Principal Problem:SBO (small bowel obstruction)    Subjective:     Interval History: did well with liquid diet, no abd pain or nausea    Continuous Infusions:   nitric oxide gas       Scheduled Meds:   budesonide-formoterol 160-4.5 mcg  2 puff Inhalation Q12H    fluticasone propionate  1 spray Each Nostril Daily    heparin (porcine)  5,000 Units Subcutaneous Q8H    irbesartan  150 mg Oral Daily    macitentan  10 mg Oral Daily    pantoprazole  40 mg Oral Daily    riociguat  2.5 mg Oral TID    selexipag  1,600 mcg Oral BID     PRN Meds:acetaminophen, albuterol-ipratropium, Dextrose 10% Bolus, glucagon (human recombinant), insulin aspart U-100, ondansetron, sodium chloride, sodium chloride 0.9%    Review of patient's allergies indicates:   Allergen Reactions    Penicillins Itching    Sulfa (sulfonamide antibiotics) Itching     Objective:     Vital Signs (Most Recent):  Temp: 98.5 °F (36.9 °C) (12/03/19 1101)  Pulse: 80 (12/03/19 1101)  Resp: (!) 22 (12/03/19 1101)  BP: (!) 105/53 (12/03/19 1101)  SpO2: (!) 93 % (12/03/19 1101) Vital Signs (24h Range):  Temp:  [98.4 °F (36.9 °C)-99.7 °F (37.6 °C)] 98.5 °F (36.9 °C)  Pulse:  [74-90] 80  Resp:  [20-52] 22  SpO2:  [89 %-98 %] 93 %  BP: ()/(49-94) 105/53     Patient Vitals for the past 72 hrs (Last 3 readings):   Weight   12/01/19 0021 92.5 kg (204 lb)   11/30/19 1544 93.4 kg (206 lb)     Body mass index is 32.93 kg/m².      Intake/Output Summary (Last 24 hours) at 12/3/2019 1154  Last data filed at 12/3/2019 1100  Gross per 24 hour   Intake 1210 ml   Output --   Net 1210 ml       Hemodynamic Parameters:         Physical Exam   Constitutional: She is oriented to person, place, and time. She appears  well-developed and well-nourished.   HENT:   Head: Normocephalic.   Neck: Normal range of motion. JVD present.   Cardiovascular: Normal rate and regular rhythm.   Pulmonary/Chest: Effort normal and breath sounds normal.   Abdominal: Soft. Bowel sounds are absent. There is generalized tenderness.   Musculoskeletal: Normal range of motion. She exhibits no edema.   Neurological: She is alert and oriented to person, place, and time.   Skin: Skin is warm and dry. Capillary refill takes less than 2 seconds.       Significant Labs:  CBC:  Recent Labs   Lab 12/01/19 0433 12/02/19  0250 12/03/19  0324   WBC 6.42 5.04 5.37   RBC 4.03 3.95* 4.03   HGB 10.5* 10.4* 10.6*   HCT 34.8* 34.5* 34.8*    186 173   MCV 86 87 86   MCH 26.1* 26.3* 26.3*   MCHC 30.2* 30.1* 30.5*     BNP:  Recent Labs   Lab 11/30/19  1610   BNP 20     CMP:  Recent Labs   Lab 12/01/19 0433 12/02/19 0250 12/03/19  0324   GLU 77 66* 83   CALCIUM 9.2 9.1 8.4*   ALBUMIN 3.7 3.7 3.3*   PROT 6.8 6.8 6.2    144 139   K 5.0 4.6 4.0   CO2 29 28 29    108 103   BUN 26* 24* 20   CREATININE 2.0* 1.6* 1.5*   ALKPHOS 59 59 59   ALT 17 21 21   AST 18 28 22   BILITOT 0.5 0.6 0.7      Coagulation:   Recent Labs   Lab 12/01/19 0433 12/02/19  0250 12/03/19  0324   INR 1.0 1.0 0.9     LDH:  No results for input(s): LDH in the last 72 hours.  Microbiology:  Microbiology Results (last 7 days)     ** No results found for the last 168 hours. **          I have reviewed all pertinent labs within the past 24 hours.    Estimated Creatinine Clearance: 41.1 mL/min (A) (based on SCr of 1.5 mg/dL (H)).    Diagnostic Results:  I have reviewed and interpreted all pertinent imaging results/findings within the past 24 hours.    Assessment and Plan:     68 y.o. AAF with mild obstructive lung disease, severe pulmonary hypertension (WHO group 1), moderate to severe RV dysfunction, chronic hypercapnic respiratory failure, obesity, active tobacco dependence, GLENN on CPAP, and  diabetes. She  underwent a RHC that showed severe PH with normal PCWP and was started on Tyvaso. A RHC while on Tyvaso 12 breaths 4x/day, opsumit and adempas 2.0 tid she had severe PA pressures and Tyvaso was discontinued in favor of Uptravi on 7/8/19. Lives alone but her niece lives close by and she and her son keep an eye on her.    Admitted to the surgical team with acute onset abdominal pain, nausea and vomiting for 1 day prior to admission. CT showed small bowel obstruction and is NPO with NGT to low suction. On day of admission she only took her PH medications until noon. Denies any shortness of breath, cough, chest pain, lightheadedness. The cardiology team was consulted to address PH medication in the setting of NPO status.       TTE 4/2019  · Normal left ventricular systolic function. The estimated ejection fraction is 60%  · Mild right ventricular enlargement.  · Low normal right ventricular systolic function. TAPSE 2.2  · Normal LV diastolic function.  · Moderate right atrial enlargement.  · The estimated PA systolic pressure is 92 mm Hg  · Elevated central venous pressure (15 mm Hg).     RHC 9/18  RA: 16/16 (14)  RV: 65/4  RVEDP: 11   PW: 15/15 (16)  PA: 60/19 (37)  AO: 105/66 (79)  AO_SAT: 100  PA_SAT: 72    CONDITION 1 (9/17/2018 11:28:21):  BP: 110/65  HR: 78  FICKCI: 2.9400  FICKCO: 6.0500  PVR: 331.0000      TTE 2/9/18  The right ventricle is upper limit of normal measuring 4.2 cm at the base in the apical right ventricle-focused view. Global right ventricular systolic function appears low normal. Tricuspid annular plane systolic excursion (TAPSE) is   2.1 cm. Tissue Doppler-derived tricuspid annular peak systolic velocity (S prime) is 14.0 cm/s. The estimated PA systolic pressure is 53 mmHg    1 - Normal left ventricular systolic function (EF 60-65%).     2 - Wall motion abnormalities.     3 - Biatrial enlargement.     4 - Impaired LV relaxation, elevated LAP (grade 2 diastolic dysfunction).     5  - Right ventricle is upper limit of normal in size with low normal systolic function.     6 - Trivial tricuspid regurgitation.     7 - Trivial pericardial effusion.     8 - Pulmonary hypertension. The estimated PA systolic pressure is 53 mmHg.    RHC 7/8/19:  · Severe Pulmonary hypertension (pt did not take any of her meds this am).  · Increased right and left-sided filling pressures.  · Normal Cardiac output and index by José method.  RA: 22/ 15/ 17 RV: 90/ 10/ 20 PA: 90/ 30/ 50 PWP: 25/ 25/ 25 . Cardiac output was 6.94 by José. Cardiac index is 3.4 L/min/m2. O2 Sat: PA 69%. Pulmonary vascular resistance: 3.6 CARLIN.    * SBO (small bowel obstruction)  -Initial Conservative management with bowel rest and NGT  -Gastrograffin study done and gastrograffin made it to the colon  -regular diet today  -Appreciate General surgery consultation and assistance     Hypertension  -BP slightly elevated this am.  Restarted home dose of Irbesartan and PH meds.  Will monitor response.     Chronic diastolic heart failure  -Last 2D Echo 4/15/19: LVEF 60%, LVEDD 5.3 cm  -Euvolemic on examination today  -Was on Bumex at home, resume today    -GDMT with Irbesartan  -2g Na dietary restriction, 1500 mL fluid restriction, strict I/Os      Chronic respiratory failure with hypoxia  -O2 sats stable  -Patient on 2L of O2 at home    TREVER (acute kidney injury)  -Likely pre-renal in the setting of emesis, dehydration and diuresis at home  -She was given genntle IVF and diuretic on hold.  Renal function better  -Avoid further nephrotoxins     Pulmonary hypertension  -PAH (WHO group 1) with most recent RHC 7/2019 - RA: 22/ 15/ 17 RV: 90/ 10/ 20 PA: 90/ 30/ 50 PWP: 25/ 25/ 25.  -In the setting of severe PH we would prefer to not withhold therapy  -Was transferred to ICU for Moy as she was unable to take po PH meds  -Weaned off Moy   -Continue home therapy: Adempas 2.5, Uptravi 1600 mcg, Opsumit 10 mg           Talia Pabon, PA  Heart  Transplant  [facility]

## 2019-12-03 NOTE — SUBJECTIVE & OBJECTIVE
Interval History: did well with liquid diet, no abd pain or nausea    Continuous Infusions:   nitric oxide gas       Scheduled Meds:   budesonide-formoterol 160-4.5 mcg  2 puff Inhalation Q12H    fluticasone propionate  1 spray Each Nostril Daily    heparin (porcine)  5,000 Units Subcutaneous Q8H    irbesartan  150 mg Oral Daily    macitentan  10 mg Oral Daily    pantoprazole  40 mg Oral Daily    riociguat  2.5 mg Oral TID    selexipag  1,600 mcg Oral BID     PRN Meds:acetaminophen, albuterol-ipratropium, Dextrose 10% Bolus, glucagon (human recombinant), insulin aspart U-100, ondansetron, sodium chloride, sodium chloride 0.9%    Review of patient's allergies indicates:   Allergen Reactions    Penicillins Itching    Sulfa (sulfonamide antibiotics) Itching     Objective:     Vital Signs (Most Recent):  Temp: 98.5 °F (36.9 °C) (12/03/19 1101)  Pulse: 80 (12/03/19 1101)  Resp: (!) 22 (12/03/19 1101)  BP: (!) 105/53 (12/03/19 1101)  SpO2: (!) 93 % (12/03/19 1101) Vital Signs (24h Range):  Temp:  [98.4 °F (36.9 °C)-99.7 °F (37.6 °C)] 98.5 °F (36.9 °C)  Pulse:  [74-90] 80  Resp:  [20-52] 22  SpO2:  [89 %-98 %] 93 %  BP: ()/(49-94) 105/53     Patient Vitals for the past 72 hrs (Last 3 readings):   Weight   12/01/19 0021 92.5 kg (204 lb)   11/30/19 1544 93.4 kg (206 lb)     Body mass index is 32.93 kg/m².      Intake/Output Summary (Last 24 hours) at 12/3/2019 1154  Last data filed at 12/3/2019 1100  Gross per 24 hour   Intake 1210 ml   Output --   Net 1210 ml       Hemodynamic Parameters:         Physical Exam   Constitutional: She is oriented to person, place, and time. She appears well-developed and well-nourished.   HENT:   Head: Normocephalic.   Neck: Normal range of motion. JVD present.   Cardiovascular: Normal rate and regular rhythm.   Pulmonary/Chest: Effort normal and breath sounds normal.   Abdominal: Soft. Bowel sounds are absent. There is generalized tenderness.   Musculoskeletal: Normal range  of motion. She exhibits no edema.   Neurological: She is alert and oriented to person, place, and time.   Skin: Skin is warm and dry. Capillary refill takes less than 2 seconds.       Significant Labs:  CBC:  Recent Labs   Lab 12/01/19 0433 12/02/19 0250 12/03/19  0324   WBC 6.42 5.04 5.37   RBC 4.03 3.95* 4.03   HGB 10.5* 10.4* 10.6*   HCT 34.8* 34.5* 34.8*    186 173   MCV 86 87 86   MCH 26.1* 26.3* 26.3*   MCHC 30.2* 30.1* 30.5*     BNP:  Recent Labs   Lab 11/30/19  1610   BNP 20     CMP:  Recent Labs   Lab 12/01/19 0433 12/02/19 0250 12/03/19 0324   GLU 77 66* 83   CALCIUM 9.2 9.1 8.4*   ALBUMIN 3.7 3.7 3.3*   PROT 6.8 6.8 6.2    144 139   K 5.0 4.6 4.0   CO2 29 28 29    108 103   BUN 26* 24* 20   CREATININE 2.0* 1.6* 1.5*   ALKPHOS 59 59 59   ALT 17 21 21   AST 18 28 22   BILITOT 0.5 0.6 0.7      Coagulation:   Recent Labs   Lab 12/01/19 0433 12/02/19 0250 12/03/19 0324   INR 1.0 1.0 0.9     LDH:  No results for input(s): LDH in the last 72 hours.  Microbiology:  Microbiology Results (last 7 days)     ** No results found for the last 168 hours. **          I have reviewed all pertinent labs within the past 24 hours.    Estimated Creatinine Clearance: 41.1 mL/min (A) (based on SCr of 1.5 mg/dL (H)).    Diagnostic Results:  I have reviewed and interpreted all pertinent imaging results/findings within the past 24 hours.

## 2019-12-03 NOTE — ASSESSMENT & PLAN NOTE
-PAH (WHO group 1) with most recent RHC 7/2019 - RA: 22/ 15/ 17 RV: 90/ 10/ 20 PA: 90/ 30/ 50 PWP: 25/ 25/ 25.  -In the setting of severe PH we would prefer to not withhold therapy  -Was transferred to ICU for Moy as she was unable to take po PH meds  -Weaned off Moy   -Continue home therapy: Adempas 2.5, Uptravi 1600 mcg, Opsumit 10 mg

## 2019-12-03 NOTE — ASSESSMENT & PLAN NOTE
68 yo F presented with epigastric abdominal pain, found to have partial small bowel obstruction on imaging. Admitted for NGT decompression and management. Now with ROBF, tolerating CLD    - OK to advance to regular diet from a general surgery perspective  - No indication for surgical intervention at this time  - appreciate HTS for rest of care

## 2019-12-03 NOTE — ASSESSMENT & PLAN NOTE
-Initial Conservative management with bowel rest and NGT  -Gastrograffin study done and gastrograffin made it to the colon  -regular diet today  -Appreciate General surgery consultation and assistance

## 2019-12-03 NOTE — PROGRESS NOTES
Pt transferred to TSU by nurse in wheelchair. Pt in chair and connected to tele. Nurse notified. Report given to LARY Awad. Belongings brought with Pt. Meds with Pt as well. Will continue to monitor.

## 2019-12-03 NOTE — SUBJECTIVE & OBJECTIVE
Interval History: SIL overnight, continues to tolerate CLD without issue. + BMs. No significant pain    Medications:  Continuous Infusions:   nitric oxide gas       Scheduled Meds:   budesonide-formoterol 160-4.5 mcg  2 puff Inhalation Q12H    fluticasone propionate  1 spray Each Nostril Daily    heparin (porcine)  5,000 Units Subcutaneous Q8H    irbesartan  150 mg Oral Daily    macitentan  10 mg Oral Daily    pantoprazole  40 mg Oral Daily    riociguat  2.5 mg Oral TID    selexipag  1,600 mcg Oral BID     PRN Meds:acetaminophen, albuterol-ipratropium, Dextrose 10% Bolus, glucagon (human recombinant), insulin aspart U-100, ondansetron, sodium chloride, sodium chloride 0.9%     Review of patient's allergies indicates:   Allergen Reactions    Penicillins Itching    Sulfa (sulfonamide antibiotics) Itching     Objective:     Vital Signs (Most Recent):  Temp: 98.5 °F (36.9 °C) (12/03/19 0701)  Pulse: 79 (12/03/19 0815)  Resp: (!) 21 (12/03/19 0815)  BP: 106/62 (12/03/19 0800)  SpO2: 98 % (12/03/19 0815) Vital Signs (24h Range):  Temp:  [98.4 °F (36.9 °C)-99.7 °F (37.6 °C)] 98.5 °F (36.9 °C)  Pulse:  [74-90] 79  Resp:  [20-52] 21  SpO2:  [89 %-98 %] 98 %  BP: ()/(49-94) 106/62     Weight: 92.5 kg (204 lb)  Body mass index is 32.93 kg/m².    Intake/Output - Last 3 Shifts       12/01 0700 - 12/02 0659 12/02 0700 - 12/03 0659 12/03 0700 - 12/04 0659    P.O. 0 890     I.V. (mL/kg) 379 (4.1)      NG/      IV Piggyback 250      Total Intake(mL/kg) 779 (8.4) 890 (9.6)     Urine (mL/kg/hr) 0 (0)      Drains 350      Total Output 350      Net +429 +890            Urine Occurrence 5 x 8 x     Stool Occurrence 4 x 4 x     Emesis Occurrence  0 x           Physical Exam  Gen: NAD, sleeping comfortably but easily arousable  Abd: soft, NT/ND  Ext: WWP    Significant Labs:  CBC:   Recent Labs   Lab 12/03/19  0324   WBC 5.37   RBC 4.03   HGB 10.6*   HCT 34.8*      MCV 86   MCH 26.3*   MCHC 30.5*     CMP:    Recent Labs   Lab 12/03/19  0324   GLU 83   CALCIUM 8.4*   ALBUMIN 3.3*   PROT 6.2      K 4.0   CO2 29      BUN 20   CREATININE 1.5*   ALKPHOS 59   ALT 21   AST 22   BILITOT 0.7       Significant Diagnostics:  I have reviewed all pertinent imaging results/findings within the past 24 hours.

## 2019-12-03 NOTE — ASSESSMENT & PLAN NOTE
-Last 2D Echo 4/15/19: LVEF 60%, LVEDD 5.3 cm  -Euvolemic on examination today  -Was on Bumex at home, resume today    -GDMT with Irbesartan  -2g Na dietary restriction, 1500 mL fluid restriction, strict I/Os

## 2019-12-03 NOTE — CARE UPDATE
Care Update    Ms. Bessie Davis has questions about her medications. Questions answered.   She has the dose of Selexipag 1600 mcg at 1700 and will continue the next dose tomorrow AM.  Resumed one time of Bumex 1 mg PO and re-evaluate in the AM to either continue home dose based on her oral intake.  BiPAP PRN and continue oxygen (uses home O2).   Continue Moy at 2 PPM with plan to wean as tolerated.   Discussed with Staff

## 2019-12-04 VITALS
BODY MASS INDEX: 32.73 KG/M2 | HEIGHT: 66 IN | RESPIRATION RATE: 17 BRPM | TEMPERATURE: 98 F | WEIGHT: 203.69 LBS | DIASTOLIC BLOOD PRESSURE: 68 MMHG | SYSTOLIC BLOOD PRESSURE: 110 MMHG | HEART RATE: 84 BPM | OXYGEN SATURATION: 91 %

## 2019-12-04 LAB
ALBUMIN SERPL BCP-MCNC: 3.3 G/DL (ref 3.5–5.2)
ALP SERPL-CCNC: 55 U/L (ref 55–135)
ALT SERPL W/O P-5'-P-CCNC: 16 U/L (ref 10–44)
ANION GAP SERPL CALC-SCNC: 8 MMOL/L (ref 8–16)
AST SERPL-CCNC: 16 U/L (ref 10–40)
BASOPHILS # BLD AUTO: 0.02 K/UL (ref 0–0.2)
BASOPHILS NFR BLD: 0.4 % (ref 0–1.9)
BILIRUB SERPL-MCNC: 0.5 MG/DL (ref 0.1–1)
BUN SERPL-MCNC: 23 MG/DL (ref 8–23)
CALCIUM SERPL-MCNC: 8.3 MG/DL (ref 8.7–10.5)
CHLORIDE SERPL-SCNC: 103 MMOL/L (ref 95–110)
CO2 SERPL-SCNC: 25 MMOL/L (ref 23–29)
CREAT SERPL-MCNC: 1.7 MG/DL (ref 0.5–1.4)
DIFFERENTIAL METHOD: ABNORMAL
EOSINOPHIL # BLD AUTO: 0.1 K/UL (ref 0–0.5)
EOSINOPHIL NFR BLD: 1.4 % (ref 0–8)
ERYTHROCYTE [DISTWIDTH] IN BLOOD BY AUTOMATED COUNT: 18.6 % (ref 11.5–14.5)
EST. GFR  (AFRICAN AMERICAN): 35.2 ML/MIN/1.73 M^2
EST. GFR  (NON AFRICAN AMERICAN): 30.6 ML/MIN/1.73 M^2
GLUCOSE SERPL-MCNC: 95 MG/DL (ref 70–110)
HCT VFR BLD AUTO: 33.8 % (ref 37–48.5)
HGB BLD-MCNC: 10.3 G/DL (ref 12–16)
IMM GRANULOCYTES # BLD AUTO: 0.02 K/UL (ref 0–0.04)
IMM GRANULOCYTES NFR BLD AUTO: 0.4 % (ref 0–0.5)
INR PPP: 1 (ref 0.8–1.2)
LYMPHOCYTES # BLD AUTO: 0.6 K/UL (ref 1–4.8)
LYMPHOCYTES NFR BLD: 11.7 % (ref 18–48)
MAGNESIUM SERPL-MCNC: 2.2 MG/DL (ref 1.6–2.6)
MCH RBC QN AUTO: 26.5 PG (ref 27–31)
MCHC RBC AUTO-ENTMCNC: 30.5 G/DL (ref 32–36)
MCV RBC AUTO: 87 FL (ref 82–98)
MONOCYTES # BLD AUTO: 0.5 K/UL (ref 0.3–1)
MONOCYTES NFR BLD: 8.8 % (ref 4–15)
NEUTROPHILS # BLD AUTO: 4 K/UL (ref 1.8–7.7)
NEUTROPHILS NFR BLD: 77.3 % (ref 38–73)
NRBC BLD-RTO: 0 /100 WBC
PHOSPHATE SERPL-MCNC: 3.9 MG/DL (ref 2.7–4.5)
PLATELET # BLD AUTO: 177 K/UL (ref 150–350)
PMV BLD AUTO: 9.1 FL (ref 9.2–12.9)
POCT GLUCOSE: 120 MG/DL (ref 70–110)
POCT GLUCOSE: 94 MG/DL (ref 70–110)
POTASSIUM SERPL-SCNC: 4 MMOL/L (ref 3.5–5.1)
PROT SERPL-MCNC: 6.4 G/DL (ref 6–8.4)
PROTHROMBIN TIME: 10.2 SEC (ref 9–12.5)
RBC # BLD AUTO: 3.89 M/UL (ref 4–5.4)
SODIUM SERPL-SCNC: 136 MMOL/L (ref 136–145)
WBC # BLD AUTO: 5.13 K/UL (ref 3.9–12.7)

## 2019-12-04 PROCEDURE — 25000003 PHARM REV CODE 250: Performed by: PHYSICIAN ASSISTANT

## 2019-12-04 PROCEDURE — 25000242 PHARM REV CODE 250 ALT 637 W/ HCPCS: Performed by: PHYSICIAN ASSISTANT

## 2019-12-04 PROCEDURE — 83735 ASSAY OF MAGNESIUM: CPT

## 2019-12-04 PROCEDURE — 27100171 HC OXYGEN HIGH FLOW UP TO 24 HOURS

## 2019-12-04 PROCEDURE — 99233 SBSQ HOSP IP/OBS HIGH 50: CPT | Mod: ,,, | Performed by: INTERNAL MEDICINE

## 2019-12-04 PROCEDURE — 84100 ASSAY OF PHOSPHORUS: CPT

## 2019-12-04 PROCEDURE — 63600175 PHARM REV CODE 636 W HCPCS: Performed by: PHYSICIAN ASSISTANT

## 2019-12-04 PROCEDURE — 94640 AIRWAY INHALATION TREATMENT: CPT

## 2019-12-04 PROCEDURE — 27000221 HC OXYGEN, UP TO 24 HOURS

## 2019-12-04 PROCEDURE — 36415 COLL VENOUS BLD VENIPUNCTURE: CPT

## 2019-12-04 PROCEDURE — 99233 PR SUBSEQUENT HOSPITAL CARE,LEVL III: ICD-10-PCS | Mod: ,,, | Performed by: INTERNAL MEDICINE

## 2019-12-04 PROCEDURE — 94761 N-INVAS EAR/PLS OXIMETRY MLT: CPT

## 2019-12-04 PROCEDURE — 85610 PROTHROMBIN TIME: CPT

## 2019-12-04 PROCEDURE — 80053 COMPREHEN METABOLIC PANEL: CPT

## 2019-12-04 PROCEDURE — 85025 COMPLETE CBC W/AUTO DIFF WBC: CPT

## 2019-12-04 RX ADMIN — IPRATROPIUM BROMIDE AND ALBUTEROL SULFATE 3 ML: .5; 3 SOLUTION RESPIRATORY (INHALATION) at 07:12

## 2019-12-04 RX ADMIN — HEPARIN SODIUM 5000 UNITS: 5000 INJECTION, SOLUTION INTRAVENOUS; SUBCUTANEOUS at 05:12

## 2019-12-04 RX ADMIN — BUMETANIDE 1 MG: 1 TABLET ORAL at 08:12

## 2019-12-04 RX ADMIN — PANTOPRAZOLE SODIUM 40 MG: 40 TABLET, DELAYED RELEASE ORAL at 08:12

## 2019-12-04 RX ADMIN — RIOCIGUAT 2.5 MG: 2.5 TABLET, FILM COATED ORAL at 08:12

## 2019-12-04 RX ADMIN — FLUTICASONE PROPIONATE 50 MCG: 50 SPRAY, METERED NASAL at 08:12

## 2019-12-04 RX ADMIN — MACITENTAN 10 MG: 10 TABLET, FILM COATED ORAL at 08:12

## 2019-12-04 RX ADMIN — IPRATROPIUM BROMIDE AND ALBUTEROL SULFATE 3 ML: .5; 3 SOLUTION RESPIRATORY (INHALATION) at 03:12

## 2019-12-04 RX ADMIN — IPRATROPIUM BROMIDE AND ALBUTEROL SULFATE 3 ML: .5; 3 SOLUTION RESPIRATORY (INHALATION) at 12:12

## 2019-12-04 RX ADMIN — IRBESARTAN 150 MG: 150 TABLET ORAL at 10:12

## 2019-12-04 RX ADMIN — BUDESONIDE AND FORMOTEROL FUMARATE DIHYDRATE 2 PUFF: 160; 4.5 AEROSOL RESPIRATORY (INHALATION) at 08:12

## 2019-12-04 NOTE — PROGRESS NOTES
[facility]  Heart Transplant  Progress Note    Patient Name: Bessie Davis  MRN: [id number]  Admission Date: 11/30/2019  Hospital Length of Stay: 2 days  Attending Physician: Elenita Garcia MD  Primary Care Provider: Priscilla Frankel MD  Principal Problem:SBO (small bowel obstruction)    Subjective:     Interval History: Patient tolerated full diet and is having normal bowel movements. She is ready to go home     Continuous Infusions:    Scheduled Meds:   albuterol-ipratropium  3 mL Nebulization Q6H WAKE    budesonide-formoterol 160-4.5 mcg  2 puff Inhalation Q12H    bumetanide  1 mg Oral Daily    fluticasone propionate  1 spray Each Nostril Daily    heparin (porcine)  5,000 Units Subcutaneous Q8H    irbesartan  150 mg Oral Daily    macitentan  10 mg Oral Daily    pantoprazole  40 mg Oral Daily    riociguat  2.5 mg Oral TID    selexipag  1,600 mcg Oral BID     PRN Meds:acetaminophen, albuterol-ipratropium, Dextrose 10% Bolus, glucagon (human recombinant), insulin aspart U-100, ondansetron, sodium chloride, sodium chloride 0.9%    Review of patient's allergies indicates:   Allergen Reactions    Penicillins Itching    Sulfa (sulfonamide antibiotics) Itching     Objective:     Vital Signs (Most Recent):  Temp: 98.6 °F (37 °C) (12/04/19 0835)  Pulse: 87 (12/04/19 0835)  Resp: 20 (12/04/19 0835)  BP: 109/67 (12/04/19 0835)  SpO2: 97 % (12/04/19 0835) Vital Signs (24h Range):  Temp:  [98.4 °F (36.9 °C)-98.8 °F (37.1 °C)] 98.6 °F (37 °C)  Pulse:  [80-90] 87  Resp:  [16-39] 20  SpO2:  [89 %-99 %] 97 %  BP: ()/(53-67) 109/67     Patient Vitals for the past 72 hrs (Last 3 readings):   Weight   12/04/19 0500 92.4 kg (203 lb 11.3 oz)     Body mass index is 32.88 kg/m².      Intake/Output Summary (Last 24 hours) at 12/4/2019 1100  Last data filed at 12/4/2019 0500  Gross per 24 hour   Intake 480 ml   Output 0 ml   Net 480 ml       Hemodynamic Parameters:       Telemetry: reviewed  Physical  Exam  Constitutional: sitting in chair NAD  Neck: Normal range of motion. No JVD elevation; neck veins appear just above the clavicle with patient sitting in chair.   Cardiovascular: Normal rate and regular rhythm.   Pulmonary/Chest: Effort normal and breath sounds normal.   Abdominal: Soft. Bowel sounds are are active. Tenderness has improved.   Musculoskeletal: Normal range of motion. She exhibits no edema.   Neurological: She is alert and oriented to person, place, and time.   Skin: Skin is warm and dry. Capillary refill takes less than 2 seconds.       Significant Labs:  CBC:  Recent Labs   Lab 12/02/19 0250 12/03/19 0324 12/04/19  0637   WBC 5.04 5.37 5.13   RBC 3.95* 4.03 3.89*   HGB 10.4* 10.6* 10.3*   HCT 34.5* 34.8* 33.8*    173 177   MCV 87 86 87   MCH 26.3* 26.3* 26.5*   MCHC 30.1* 30.5* 30.5*     BNP:  Recent Labs   Lab 11/30/19  1610   BNP 20     CMP:  Recent Labs   Lab 12/02/19 0250 12/03/19 0324 12/04/19  0637   GLU 66* 83 95   CALCIUM 9.1 8.4* 8.3*   ALBUMIN 3.7 3.3* 3.3*   PROT 6.8 6.2 6.4    139 136   K 4.6 4.0 4.0   CO2 28 29 25    103 103   BUN 24* 20 23   CREATININE 1.6* 1.5* 1.7*   ALKPHOS 59 59 55   ALT 21 21 16   AST 28 22 16   BILITOT 0.6 0.7 0.5      Coagulation:   Recent Labs   Lab 12/02/19 0250 12/03/19 0324 12/04/19  0637   INR 1.0 0.9 1.0     LDH:  No results for input(s): LDH in the last 72 hours.  Microbiology:  Microbiology Results (last 7 days)     ** No results found for the last 168 hours. **          I have reviewed all pertinent labs within the past 24 hours.    Estimated Creatinine Clearance: 36.3 mL/min (A) (based on SCr of 1.7 mg/dL (H)).    Diagnostic Results:  I have reviewed all pertinent imaging results/findings within the past 24 hours.    Assessment and Plan:     68 y.o. AAF with mild obstructive lung disease, severe pulmonary hypertension (WHO group 1), moderate to severe RV dysfunction, chronic hypercapnic respiratory failure, obesity, active  tobacco dependence, GLENN on CPAP, and diabetes. She  underwent a RHC that showed severe PH with normal PCWP and was started on Tyvaso. A RHC while on Tyvaso 12 breaths 4x/day, opsumit and adempas 2.0 tid she had severe PA pressures and Tyvaso was discontinued in favor of Uptravi on 7/8/19. Lives alone but her niece lives close by and she and her son keep an eye on her.    Admitted to the surgical team with acute onset abdominal pain, nausea and vomiting for 1 day prior to admission. CT showed small bowel obstruction and is NPO with NGT to low suction. On day of admission she only took her PH medications until noon. Denies any shortness of breath, cough, chest pain, lightheadedness. The cardiology team was consulted to address PH medication in the setting of NPO status.       TTE 4/2019  · Normal left ventricular systolic function. The estimated ejection fraction is 60%  · Mild right ventricular enlargement.  · Low normal right ventricular systolic function. TAPSE 2.2  · Normal LV diastolic function.  · Moderate right atrial enlargement.  · The estimated PA systolic pressure is 92 mm Hg  · Elevated central venous pressure (15 mm Hg).     RHC 9/18  RA: 16/16 (14)  RV: 65/4  RVEDP: 11   PW: 15/15 (16)  PA: 60/19 (37)  AO: 105/66 (79)  AO_SAT: 100  PA_SAT: 72    CONDITION 1 (9/17/2018 11:28:21):  BP: 110/65  HR: 78  FICKCI: 2.9400  FICKCO: 6.0500  PVR: 331.0000      TTE 2/9/18  The right ventricle is upper limit of normal measuring 4.2 cm at the base in the apical right ventricle-focused view. Global right ventricular systolic function appears low normal. Tricuspid annular plane systolic excursion (TAPSE) is   2.1 cm. Tissue Doppler-derived tricuspid annular peak systolic velocity (S prime) is 14.0 cm/s. The estimated PA systolic pressure is 53 mmHg    1 - Normal left ventricular systolic function (EF 60-65%).     2 - Wall motion abnormalities.     3 - Biatrial enlargement.     4 - Impaired LV relaxation, elevated LAP  (grade 2 diastolic dysfunction).     5 - Right ventricle is upper limit of normal in size with low normal systolic function.     6 - Trivial tricuspid regurgitation.     7 - Trivial pericardial effusion.     8 - Pulmonary hypertension. The estimated PA systolic pressure is 53 mmHg.    RHC 7/8/19:  · Severe Pulmonary hypertension (pt did not take any of her meds this am).  · Increased right and left-sided filling pressures.  · Normal Cardiac output and index by José method.  RA: 22/ 15/ 17 RV: 90/ 10/ 20 PA: 90/ 30/ 50 PWP: 25/ 25/ 25 . Cardiac output was 6.94 by José. Cardiac index is 3.4 L/min/m2. O2 Sat: PA 69%. Pulmonary vascular resistance: 3.6 CARLIN.    * SBO (small bowel obstruction)  -Initial Conservative management with bowel rest and NGT  -Gastrograffin study done and gastrograffin made it to the colon  -regular diet today  -Appreciate General surgery consultation and assistance     Pulmonary hypertension  -PAH (WHO group 1) with most recent RHC 7/2019 - RA: 22/ 15/ 17 RV: 90/ 10/ 20 PA: 90/ 30/ 50 PWP: 25/ 25/ 25.  -In the setting of severe PH we would prefer to not withhold therapy  -Was transferred to ICU for Moy as she was unable to take po PH meds  -Weaned off Moy   -Continue home therapy: Adempas 2.5, Uptravi 1600 mcg, Opsumit 10 mg       TREVER (acute kidney injury)  -Likely pre-renal in the setting of emesis, dehydration and diuresis at home  -She was given genntle IVF and diuretic on hold.  Renal function better  -Avoid further nephrotoxins     Chronic diastolic heart failure  -Last 2D Echo 4/15/19: LVEF 60%, LVEDD 5.3 cm  -Euvolemic on examination today  -Resumed home Bumex dose  -GDMT with Irbesartan  -2g Na dietary restriction, 1500 mL fluid restriction, strict I/Os      Chronic respiratory failure with hypoxia  -O2 sats stable  -Patient on 2L of O2 at home    Hypertension  -BP controlled on home regimen      MARIBEL Larkin  Heart Transplant  [facility]

## 2019-12-04 NOTE — PROGRESS NOTES
DISCHARGE    SW to pt's room for discharge today.  Pt presents as sitting up at edge of bed, aao x4, pleasant, calm, cooperative, and asking and answering questions appropriately.  Pt verbalizes understanding and agreement with plan for d/c to home today.  Pt reports her brother will transport her home today.  Pt reports portable O2 is at home and brother does not have a key to her house.  SW spoke with Genny at [facility] ([phone]) to request tank be delivered to hospital.  SW confirmed tank has been delivered.  Pt denies any other d/c needs, and none identified by medical team.  Pt reports coping adequately at this time, and denies any needs or concerns to SW.  SW providing ongoing psychosocial and counseling support, education, resources, assistance, and discharge planning as indicated.  SW remains available.

## 2019-12-04 NOTE — NURSING
Pt to be d/c home - transported off unit via wheelchair and pts brother. Peripheral IV's removed - catheters intact. Home O2 delivered to pts bedside - pt sent home on 4L NC - sats stable. AVS printed and given to pt - pt verbalized understanding of AVS. Medications sent home with pt. Pt denies all questions and complaints at this time. Pt in no apparent distress.

## 2019-12-04 NOTE — ASSESSMENT & PLAN NOTE
-Last 2D Echo 4/15/19: LVEF 60%, LVEDD 5.3 cm  -Euvolemic on examination today  -Resumed home Bumex dose  -GDMT with Irbesartan  -2g Na dietary restriction, 1500 mL fluid restriction, strict I/Os

## 2019-12-04 NOTE — DISCHARGE SUMMARY
[facility]  Heart Transplant  Discharge Summary      Patient Name: Bessie Davis  MRN: [id number]  Admission Date: 11/30/2019  Hospital Length of Stay: 2 days  Discharge Date and Time: 12/04/2019 11:14 AM  Attending Physician: Elenita Garcia MD   Discharging Provider: MARIBEL Larkin  Primary Care Provider: Priscilla Frankel MD     HPI: 68 y.o. 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 She  underwent a RHC that showed severe PH with normal PCWP and was started on Tyvaso. A RHC while on Tyvaso 12 breaths 4x/day, opsumit and adempas 2.0 tid she had severe PA pressures and Tyvaso was discontinued in favor of Uptravi on 7/8/19. Lives alone but her niece lives close by and she and her son keep an eye on her.   Admitted to the surgical team with acute onset abdominal pain, nausea and vomiting for 1 day prior to admission. CT showed small bowel obstruction and is NPO with NGT to low suction. On day of admission she only took her PH medications until noon. Denies any shortness of breath, cough, chest pain, lightheadedness. The cardiology team was consulted to address PH medication in the setting of NPO status.    * No surgery found *     Hospital Course: Patient admitted to [facility] service and placed on 24 hour telemetry   Upon admit; General surgery was consulted and managed SBO conservatively; NG tube placed and patient did well.  NG tube removed and her diet was advanced as tolerated.  On day of discharge; she was tolerating full diet with normal bowel movements.   While patient was NPO we held her Adempas, Uptravi and Opsumit and treated her with Moy.  We weaned Moy when she was able to take po.   Her renal function was elevated on admission and improved throughout her hospital stay.  Likely secondary to dehydration   She was  discharged in stable condition.  She had scheduled follow up on 12/5, but asked that it be moved back since he was being discharged the day before.      Consults (From admission, onward)        Status Ordering Provider     Inpatient consult to General surgery  Once     Provider:  (Not yet assigned)    CRYSTAL Orr          Significant Diagnostic Studies: see reports for full details  CT: 11/30/19  Abdominal X-ray: 11/30, 12/1    Pending Diagnostic Studies:     None        Final Active Diagnoses:    Diagnosis Date Noted POA    PRINCIPAL PROBLEM:  SBO (small bowel obstruction) [K56.609] 12/01/2019 Yes    Pulmonary hypertension [I27.20] 05/11/2015 Yes    TREVER (acute kidney injury) [N17.9] 02/06/2016 Yes    Chronic diastolic heart failure [I50.32] 12/02/2019 Unknown    Chronic respiratory failure with hypoxia [J96.11] 02/07/2016 Yes    Hypertension [I10] 12/02/2019 Unknown    Abdominal pain [R10.9] 12/01/2019 Yes      Problems Resolved During this Admission:      Discharged Condition: stable    Disposition: Home or Self Care    Follow Up:    Patient Instructions:      Diet Cardiac     Notify your health care provider if you experience any of the following:  temperature >100.4     Notify your health care provider if you experience any of the following:  redness, tenderness, or signs of infection (pain, swelling, redness, odor or green/yellow discharge around incision site)     Activity as tolerated     Medications:  Reconciled Home Medications:      Medication List      CHANGE how you take these medications    bumetanide 1 MG tablet  Commonly known as:  BUMEX  Take 2 tablets (2 mg total) by mouth 2 (two) times daily. DO NOT TAKE UNTIL FOLLOWING UP WITH MD.  What changed:    · how much to take  · additional instructions     cetirizine 5 MG tablet  Commonly known as:  ZYRTEC  Take 1 tablet (5 mg total) by mouth once daily.  What changed:    · when to take this  · reasons to take this        CONTINUE  taking these medications    Adempas 2.5 mg tablet  Generic drug:  riociguat  Take 2.5 mg by mouth 3 (three) times daily.     albuterol 90 mcg/actuation inhaler  Commonly known as:  PROVENTIL/VENTOLIN HFA  Inhale 2 puffs into the lungs every 6 (six) hours as needed for Wheezing.     allopurinol 100 MG tablet  Commonly known as:  ZYLOPRIM  Take 100 mg by mouth once daily.     ammonium lactate 12 % Crea  Apply to feet daily after showering     aspirin 81 MG EC tablet  Commonly known as:  ECOTRIN  Take 1 tablet (81 mg total) by mouth once daily.     atorvastatin 80 MG tablet  Commonly known as:  LIPITOR  Take 1 tablet (80 mg total) by mouth every evening. DO NOT TAKE UNTIL FOLLOWING UP WITH MD.     azithromycin 1 gram Pack  Commonly known as:  ZITHROMAX  Take 1 g by mouth once.     budesonide-formoterol 160-4.5 mcg 160-4.5 mcg/actuation Hfaa  Commonly known as:  SYMBICORT  Inhale 2 puffs into the lungs every 12 (twelve) hours.     CALCIUM ACETATE ORAL  Take 1 tablet by mouth once daily.     Cheratussin DAC 30- mg/5 mL Syrp  Generic drug:  pseudoephedrine-codeine-GG  as needed.     Colcrys 0.6 mg tablet  Generic drug:  colchicine  Take 0.6 mg by mouth as needed.     fluticasone propionate 50 mcg/actuation nasal spray  Commonly known as:  FLONASE     guaiFENesin 600 mg 12 hr tablet  Commonly known as:  MUCINEX  Take 1,200 mg by mouth 2 (two) times daily as needed for Congestion.     HYDROcodone-acetaminophen 5-300 mg Tab  Take 1 tablet by mouth 2 (two) times daily as needed (for pain).     irbesartan 150 MG tablet  Commonly known as:  AVAPRO  Take 150 mg by mouth once daily.     magnesium oxide 400 mg (241.3 mg magnesium) tablet  Commonly known as:  MAG-OX  Take 1 tablet (400 mg total) by mouth once daily.     metFORMIN 500 MG tablet  Commonly known as:  GLUCOPHAGE  Take 500 mg by mouth 2 (two) times daily.     mupirocin 2 % ointment  Commonly known as:  BACTROBAN     Opsumit 10 mg Tab  Generic drug:   macitentan  Take 10 mg by mouth once daily.     pantoprazole 40 MG tablet  Commonly known as:  PROTONIX     potassium chloride SA 20 MEQ tablet  Commonly known as:  K-DUR,KLOR-CON  Take 1 tablet (20 mEq total) by mouth once daily. DO NOT RESTART UNTIL YOU RESTART YOUR BUMEX.     promethazine-codeine 6.25-10 mg/5 ml 6.25-10 mg/5 mL syrup  Commonly known as:  PHENERGAN with CODEINE  TAKE 1-2 TEASPOONSFUL BY MOUTH 4 TIMES DAILY AS NEEDED FOR COUGH     ranitidine 150 MG tablet  Commonly known as:  ZANTAC  Take 150 mg by mouth once daily.     triamcinolone acetonide 0.1% 0.1 % cream  Commonly known as:  KENALOG     Uptravi 1,600 mcg Tab  Generic drug:  selexipag  Take by mouth.     VITAMIN D3 COMPLETE ORAL  Take 500 mg by mouth.            MARIBEL Larkin  Heart Transplant  [facility]

## 2019-12-04 NOTE — SUBJECTIVE & OBJECTIVE
Interval History: Patient tolerated full diet and is having normal bowel movements. She is ready to go home     Continuous Infusions:    Scheduled Meds:   albuterol-ipratropium  3 mL Nebulization Q6H WAKE    budesonide-formoterol 160-4.5 mcg  2 puff Inhalation Q12H    bumetanide  1 mg Oral Daily    fluticasone propionate  1 spray Each Nostril Daily    heparin (porcine)  5,000 Units Subcutaneous Q8H    irbesartan  150 mg Oral Daily    macitentan  10 mg Oral Daily    pantoprazole  40 mg Oral Daily    riociguat  2.5 mg Oral TID    selexipag  1,600 mcg Oral BID     PRN Meds:acetaminophen, albuterol-ipratropium, Dextrose 10% Bolus, glucagon (human recombinant), insulin aspart U-100, ondansetron, sodium chloride, sodium chloride 0.9%    Review of patient's allergies indicates:   Allergen Reactions    Penicillins Itching    Sulfa (sulfonamide antibiotics) Itching     Objective:     Vital Signs (Most Recent):  Temp: 98.6 °F (37 °C) (12/04/19 0835)  Pulse: 87 (12/04/19 0835)  Resp: 20 (12/04/19 0835)  BP: 109/67 (12/04/19 0835)  SpO2: 97 % (12/04/19 0835) Vital Signs (24h Range):  Temp:  [98.4 °F (36.9 °C)-98.8 °F (37.1 °C)] 98.6 °F (37 °C)  Pulse:  [80-90] 87  Resp:  [16-39] 20  SpO2:  [89 %-99 %] 97 %  BP: ()/(53-67) 109/67     Patient Vitals for the past 72 hrs (Last 3 readings):   Weight   12/04/19 0500 92.4 kg (203 lb 11.3 oz)     Body mass index is 32.88 kg/m².      Intake/Output Summary (Last 24 hours) at 12/4/2019 1100  Last data filed at 12/4/2019 0500  Gross per 24 hour   Intake 480 ml   Output 0 ml   Net 480 ml       Hemodynamic Parameters:       Telemetry: reviewed  Physical Exam  Constitutional: sitting in chair NAD  Neck: Normal range of motion. No JVD elevation; neck veins appear just above the clavicle with patient sitting in chair.   Cardiovascular: Normal rate and regular rhythm.   Pulmonary/Chest: Effort normal and breath sounds normal.   Abdominal: Soft. Bowel sounds are are active.  Tenderness has improved.   Musculoskeletal: Normal range of motion. She exhibits no edema.   Neurological: She is alert and oriented to person, place, and time.   Skin: Skin is warm and dry. Capillary refill takes less than 2 seconds.       Significant Labs:  CBC:  Recent Labs   Lab 12/02/19 0250 12/03/19 0324 12/04/19  0637   WBC 5.04 5.37 5.13   RBC 3.95* 4.03 3.89*   HGB 10.4* 10.6* 10.3*   HCT 34.5* 34.8* 33.8*    173 177   MCV 87 86 87   MCH 26.3* 26.3* 26.5*   MCHC 30.1* 30.5* 30.5*     BNP:  Recent Labs   Lab 11/30/19  1610   BNP 20     CMP:  Recent Labs   Lab 12/02/19 0250 12/03/19 0324 12/04/19  0637   GLU 66* 83 95   CALCIUM 9.1 8.4* 8.3*   ALBUMIN 3.7 3.3* 3.3*   PROT 6.8 6.2 6.4    139 136   K 4.6 4.0 4.0   CO2 28 29 25    103 103   BUN 24* 20 23   CREATININE 1.6* 1.5* 1.7*   ALKPHOS 59 59 55   ALT 21 21 16   AST 28 22 16   BILITOT 0.6 0.7 0.5      Coagulation:   Recent Labs   Lab 12/02/19 0250 12/03/19 0324 12/04/19  0637   INR 1.0 0.9 1.0     LDH:  No results for input(s): LDH in the last 72 hours.  Microbiology:  Microbiology Results (last 7 days)     ** No results found for the last 168 hours. **          I have reviewed all pertinent labs within the past 24 hours.    Estimated Creatinine Clearance: 36.3 mL/min (A) (based on SCr of 1.7 mg/dL (H)).    Diagnostic Results:  I have reviewed all pertinent imaging results/findings within the past 24 hours.

## 2019-12-04 NOTE — PLAN OF CARE
AAOx3, afebrile, no c/o pain. No c/o n/v. Pt in lowest postion, side rails up x2, non skid footwear in place, call light within reach, pt verbalized understanding to call the nurse when needed. Hand hygiene practiced per protocol. Pt able to reposition self independently. Will continue to monitor.

## 2019-12-05 ENCOUNTER — TELEPHONE (OUTPATIENT)
Dept: TRANSPLANT | Facility: CLINIC | Age: 69
End: 2019-12-05

## 2019-12-06 ENCOUNTER — TELEPHONE (OUTPATIENT)
Dept: TRANSPLANT | Facility: CLINIC | Age: 69
End: 2019-12-06

## 2019-12-06 ENCOUNTER — PATIENT OUTREACH (OUTPATIENT)
Dept: ADMINISTRATIVE | Facility: CLINIC | Age: 69
End: 2019-12-06

## 2019-12-06 NOTE — TELEPHONE ENCOUNTER
----- Message from Gretchen Sutherland RN sent at 12/6/2019  9:28 AM CST -----  Regarding: Bumex  Please forward this important TCC information to your provider in order to maximize the post discharge care delivery of this patient.    C3 nurse spoke with Bessie Davis for a TCC post hospital discharge follow up call. The patient is confused about what dose and frequency she should take for Bumex.  Patient reports that she has 10 mg bumex and the MAR reflects 1 mg bumex.  Please advise patient ASAP      Respectfully,  Gretchen Sutherland, RN  [facility]    [email or web address]       Please do not reply to this message, as this inbox is not routinely monitored.

## 2019-12-06 NOTE — TELEPHONE ENCOUNTER
Spoke w/pt, who says she will bring bottle of bumex to clinic appt, but she is certain it is 10mg tabs. Per D/C Summary, instructions for bumex are for pt not to take ANY until f/u in clinic. Message sent to Dr Salmeron and Michelle Nicholas to confirm if this is correct. Explained to pt that I would contact her w/followup, as needed.

## 2019-12-06 NOTE — PROGRESS NOTES
Patient has 10mg bumex and needs clarification on dose.  Patient asked nurse to send message to Dr. Le Salmeron.  Message sent to Dr. Salmeron staff.

## 2019-12-09 ENCOUNTER — TELEPHONE (OUTPATIENT)
Dept: TRANSPLANT | Facility: CLINIC | Age: 69
End: 2019-12-09

## 2019-12-09 NOTE — TELEPHONE ENCOUNTER
----- Message from Le Salmeron MD sent at 12/6/2019 10:12 AM CST -----  Regarding: RE: Bumex  That's what dc sum says. amrty had her w woo. Lets get labs and go from there  ]    ----- Message -----  From: DOREEN Ulloa, RN  Sent: 12/6/2019   9:44 AM CST  To: Le Salmeron MD, MARIBEL Larkin  Subject: FW: Bumex                                        Hi-  I spoke w/pt, and she was confused on whether or not to take bumex. It appears in the D/C summary that the instructions were for pt to NOT take any bumex until she is seen in clinic. Is this correct?  Thank you,  Katerin   ----- Message -----  From: Gretchen Sutherland RN  Sent: 12/6/2019   9:28 AM CST  To: [facility] Staff  Subject: Bumex                                            Please forward this important TCC information to your provider in order to maximize the post discharge care delivery of this patient.    C3 nurse spoke with Bessie Davis for a TCC post hospital discharge follow up call. The patient is confused about what dose and frequency she should take for Bumex.  Patient reports that she has 10 mg bumex and the MAR reflects 1 mg bumex.  Please advise patient ASAP      Respectfully,  Gretchen Sutherland, RN  [facility]    [email or web address]       Please do not reply to this message, as this inbox is not routinely monitored.

## 2019-12-09 NOTE — TELEPHONE ENCOUNTER
"Returned call to pt, who reports she feels she is accumulating more fluid since diuretic D/C. Pt reports "legs are tight and shiny". Lab appointment for tomorrow, which works best for pt. Will continue to monitor.  "

## 2019-12-11 ENCOUNTER — TELEPHONE (OUTPATIENT)
Dept: TRANSPLANT | Facility: CLINIC | Age: 69
End: 2019-12-11

## 2019-12-11 NOTE — TELEPHONE ENCOUNTER
Left VM for pt, asking when she would like to reschedule missed lab appt. Also asked pt to please call invi, and ask about LIS for pharmacy copay assistance.

## 2019-12-17 ENCOUNTER — TELEPHONE (OUTPATIENT)
Dept: TRANSPLANT | Facility: CLINIC | Age: 69
End: 2019-12-17

## 2019-12-18 ENCOUNTER — TELEPHONE (OUTPATIENT)
Dept: TRANSPLANT | Facility: CLINIC | Age: 69
End: 2019-12-18

## 2019-12-18 ENCOUNTER — LAB VISIT (OUTPATIENT)
Dept: LAB | Facility: HOSPITAL | Age: 69
End: 2019-12-18
Attending: INTERNAL MEDICINE
Payer: MEDICARE

## 2019-12-18 ENCOUNTER — OFFICE VISIT (OUTPATIENT)
Dept: TRANSPLANT | Facility: CLINIC | Age: 69
End: 2019-12-18
Payer: MEDICARE

## 2019-12-18 VITALS
DIASTOLIC BLOOD PRESSURE: 53 MMHG | BODY MASS INDEX: 32.99 KG/M2 | HEIGHT: 66 IN | SYSTOLIC BLOOD PRESSURE: 110 MMHG | HEART RATE: 81 BPM | WEIGHT: 205.25 LBS

## 2019-12-18 DIAGNOSIS — I27.20 PULMONARY HYPERTENSION: Primary | ICD-10-CM

## 2019-12-18 DIAGNOSIS — Z79.899 POLYPHARMACY: ICD-10-CM

## 2019-12-18 DIAGNOSIS — J96.11 CHRONIC RESPIRATORY FAILURE WITH HYPOXIA: ICD-10-CM

## 2019-12-18 DIAGNOSIS — I50.810 RVF (RIGHT VENTRICULAR FAILURE): ICD-10-CM

## 2019-12-18 DIAGNOSIS — R06.82 TACHYPNEA: ICD-10-CM

## 2019-12-18 LAB
ALBUMIN SERPL BCP-MCNC: 3.6 G/DL (ref 3.5–5.2)
ALP SERPL-CCNC: 55 U/L (ref 55–135)
ALT SERPL W/O P-5'-P-CCNC: 7 U/L (ref 10–44)
ANION GAP SERPL CALC-SCNC: 5 MMOL/L (ref 8–16)
AST SERPL-CCNC: 11 U/L (ref 10–40)
BASOPHILS # BLD AUTO: 0.02 K/UL (ref 0–0.2)
BASOPHILS NFR BLD: 0.5 % (ref 0–1.9)
BILIRUB SERPL-MCNC: 0.5 MG/DL (ref 0.1–1)
BNP SERPL-MCNC: 29 PG/ML (ref 0–99)
BUN SERPL-MCNC: 18 MG/DL (ref 8–23)
CALCIUM SERPL-MCNC: 9.4 MG/DL (ref 8.7–10.5)
CHLORIDE SERPL-SCNC: 107 MMOL/L (ref 95–110)
CO2 SERPL-SCNC: 25 MMOL/L (ref 23–29)
CREAT SERPL-MCNC: 1.7 MG/DL (ref 0.5–1.4)
DIFFERENTIAL METHOD: ABNORMAL
EOSINOPHIL # BLD AUTO: 0.1 K/UL (ref 0–0.5)
EOSINOPHIL NFR BLD: 2.2 % (ref 0–8)
ERYTHROCYTE [DISTWIDTH] IN BLOOD BY AUTOMATED COUNT: 19.9 % (ref 11.5–14.5)
EST. GFR  (AFRICAN AMERICAN): 35.2 ML/MIN/1.73 M^2
EST. GFR  (NON AFRICAN AMERICAN): 30.6 ML/MIN/1.73 M^2
GLUCOSE SERPL-MCNC: 94 MG/DL (ref 70–110)
HCT VFR BLD AUTO: 35.8 % (ref 37–48.5)
HGB BLD-MCNC: 10.6 G/DL (ref 12–16)
IMM GRANULOCYTES # BLD AUTO: 0.02 K/UL (ref 0–0.04)
IMM GRANULOCYTES NFR BLD AUTO: 0.5 % (ref 0–0.5)
LYMPHOCYTES # BLD AUTO: 0.7 K/UL (ref 1–4.8)
LYMPHOCYTES NFR BLD: 17.6 % (ref 18–48)
MAGNESIUM SERPL-MCNC: 2.2 MG/DL (ref 1.6–2.6)
MCH RBC QN AUTO: 26.3 PG (ref 27–31)
MCHC RBC AUTO-ENTMCNC: 29.6 G/DL (ref 32–36)
MCV RBC AUTO: 89 FL (ref 82–98)
MONOCYTES # BLD AUTO: 0.3 K/UL (ref 0.3–1)
MONOCYTES NFR BLD: 8.2 % (ref 4–15)
NEUTROPHILS # BLD AUTO: 3 K/UL (ref 1.8–7.7)
NEUTROPHILS NFR BLD: 71 % (ref 38–73)
NRBC BLD-RTO: 0 /100 WBC
PLATELET # BLD AUTO: 241 K/UL (ref 150–350)
PMV BLD AUTO: 8.9 FL (ref 9.2–12.9)
POTASSIUM SERPL-SCNC: 5.2 MMOL/L (ref 3.5–5.1)
PROT SERPL-MCNC: 6.6 G/DL (ref 6–8.4)
RBC # BLD AUTO: 4.03 M/UL (ref 4–5.4)
SODIUM SERPL-SCNC: 137 MMOL/L (ref 136–145)
WBC # BLD AUTO: 4.15 K/UL (ref 3.9–12.7)

## 2019-12-18 PROCEDURE — 1126F AMNT PAIN NOTED NONE PRSNT: CPT | Mod: S$GLB,,, | Performed by: INTERNAL MEDICINE

## 2019-12-18 PROCEDURE — 3078F DIAST BP <80 MM HG: CPT | Mod: CPTII,S$GLB,, | Performed by: INTERNAL MEDICINE

## 2019-12-18 PROCEDURE — 1101F PT FALLS ASSESS-DOCD LE1/YR: CPT | Mod: CPTII,S$GLB,, | Performed by: INTERNAL MEDICINE

## 2019-12-18 PROCEDURE — 1159F MED LIST DOCD IN RCRD: CPT | Mod: S$GLB,,, | Performed by: INTERNAL MEDICINE

## 2019-12-18 PROCEDURE — 1101F PR PT FALLS ASSESS DOC 0-1 FALLS W/OUT INJ PAST YR: ICD-10-PCS | Mod: CPTII,S$GLB,, | Performed by: INTERNAL MEDICINE

## 2019-12-18 PROCEDURE — 36415 COLL VENOUS BLD VENIPUNCTURE: CPT

## 2019-12-18 PROCEDURE — 3074F PR MOST RECENT SYSTOLIC BLOOD PRESSURE < 130 MM HG: ICD-10-PCS | Mod: CPTII,S$GLB,, | Performed by: INTERNAL MEDICINE

## 2019-12-18 PROCEDURE — 83735 ASSAY OF MAGNESIUM: CPT

## 2019-12-18 PROCEDURE — 99214 OFFICE O/P EST MOD 30 MIN: CPT | Mod: S$GLB,,, | Performed by: INTERNAL MEDICINE

## 2019-12-18 PROCEDURE — 83880 ASSAY OF NATRIURETIC PEPTIDE: CPT

## 2019-12-18 PROCEDURE — 3074F SYST BP LT 130 MM HG: CPT | Mod: CPTII,S$GLB,, | Performed by: INTERNAL MEDICINE

## 2019-12-18 PROCEDURE — 99214 PR OFFICE/OUTPT VISIT, EST, LEVL IV, 30-39 MIN: ICD-10-PCS | Mod: S$GLB,,, | Performed by: INTERNAL MEDICINE

## 2019-12-18 PROCEDURE — 1126F PR PAIN SEVERITY QUANTIFIED, NO PAIN PRESENT: ICD-10-PCS | Mod: S$GLB,,, | Performed by: INTERNAL MEDICINE

## 2019-12-18 PROCEDURE — 80053 COMPREHEN METABOLIC PANEL: CPT

## 2019-12-18 PROCEDURE — 99999 PR PBB SHADOW E&M-EST. PATIENT-LVL III: CPT | Mod: PBBFAC,,, | Performed by: INTERNAL MEDICINE

## 2019-12-18 PROCEDURE — 3078F PR MOST RECENT DIASTOLIC BLOOD PRESSURE < 80 MM HG: ICD-10-PCS | Mod: CPTII,S$GLB,, | Performed by: INTERNAL MEDICINE

## 2019-12-18 PROCEDURE — 85025 COMPLETE CBC W/AUTO DIFF WBC: CPT

## 2019-12-18 PROCEDURE — 99999 PR PBB SHADOW E&M-EST. PATIENT-LVL III: ICD-10-PCS | Mod: PBBFAC,,, | Performed by: INTERNAL MEDICINE

## 2019-12-18 PROCEDURE — 1159F PR MEDICATION LIST DOCUMENTED IN MEDICAL RECORD: ICD-10-PCS | Mod: S$GLB,,, | Performed by: INTERNAL MEDICINE

## 2019-12-18 RX ORDER — BUDESONIDE AND FORMOTEROL FUMARATE DIHYDRATE 160; 4.5 UG/1; UG/1
2 AEROSOL RESPIRATORY (INHALATION)
COMMUNITY
Start: 2016-12-21

## 2019-12-18 RX ORDER — TIOTROPIUM BROMIDE INHALATION SPRAY 3.12 UG/1
SPRAY, METERED RESPIRATORY (INHALATION)
COMMUNITY
Start: 2019-11-06

## 2019-12-18 RX ORDER — BUMETANIDE 1 MG/1
1 TABLET ORAL 2 TIMES DAILY
Qty: 30 TABLET | Refills: 1 | Status: SHIPPED | OUTPATIENT
Start: 2019-12-18 | End: 2019-12-18 | Stop reason: SDUPTHER

## 2019-12-18 RX ORDER — BUMETANIDE 1 MG/1
1 TABLET ORAL 2 TIMES DAILY
Qty: 180 TABLET | Refills: 3 | Status: SHIPPED | OUTPATIENT
Start: 2019-12-18 | End: 2020-01-09

## 2019-12-18 RX ORDER — SODIUM CHLORIDE 0.65 %
1 AEROSOL, SPRAY (ML) NASAL
COMMUNITY
Start: 2016-12-23

## 2019-12-18 NOTE — PROGRESS NOTES
Subjective:     HPI:  Ms. Davis is a very pleasant 68 y.o. AAF with severe pulmonary hypertension (WHO group 1), moderate to severe RV dysfunction, chronic hypercapnic respiratory failure, , obesity, active tobacco dependence, GLENN on CPAP, and diabetes who comes today for a post-hospital discharge F/U. She underwent a RHC that showed severe PH with normal PCWP. After which she was started on Tyvaso. Now on tyvaso 12 breaths 4x/day, opsumit and adempas 2.0 tid . She was recently discharged from the hospital after been treated for bowel obstruction (no surgical intervention was done). Today she reports worsening COOL, feelign tired. Of note, patient states she was told not to take her bumex.     Past Medical History:   Diagnosis Date    Asthma     COPD (chronic obstructive pulmonary disease)     Diastolic dysfunction with heart failure 5/20/2015    Occl RCA, high grade prox LAD and LCfx cath [facility] Dec 2014 5/11/2015    Pulmonary HTN     Sleep apnea      Past Surgical History:   Procedure Laterality Date    BACK SURGERY      HYSTERECTOMY      RIGHT HEART CATHETERIZATION Right 9/17/2018    Procedure: HEART CATH-RIGHT;  Surgeon: Le Salmeron MD;  [facility] C[facility] LAB;  Service: Cardiology;  Laterality: Right;    RIGHT HEART CATHETERIZATION Right 7/8/2019    Procedure: HEART CATH-RIGHT;  Surgeon: Le Salmeron MD;  Location: [facility] CATH LAB;  Service: Cardiology;  Laterality: Right;     OB History    None       Review of Systems   Constitution: Negative. Negative for chills, decreased appetite, diaphoresis, fever, malaise/fatigue, night sweats, weight gain and weight loss.   Eyes: Negative.    Cardiovascular: Positive for dyspnea on exertion. Negative for chest pain, claudication, cyanosis, irregular heartbeat, leg swelling, near-syncope, orthopnea, palpitations, paroxysmal nocturnal dyspnea and syncope.   Respiratory: Negative for cough, hemoptysis and shortness of breath.    Endocrine: Negative.   "  Hematologic/Lymphatic: Negative.    Skin: Negative for color change, dry skin and nail changes.   Musculoskeletal: Negative.    Gastrointestinal: Negative.    Genitourinary: Negative.    Neurological: Negative for weakness.       Objective:   Blood pressure (!) 110/53, pulse 81, height 5' 6" (1.676 m), weight 93.1 kg (205 lb 4 oz).body mass index is 33.13 kg/m².  Physical Exam   Constitutional: She is oriented to person, place, and time. Vital signs are normal. She appears well-developed and well-nourished.   HENT:   Head: Normocephalic.   Eyes: Pupils are equal, round, and reactive to light.   Neck: Neck supple. JVD present.   Cardiovascular: Normal rate, regular rhythm, normal heart sounds and intact distal pulses. PMI is not displaced. Exam reveals no gallop and no friction rub.   No murmur heard.  Pulmonary/Chest: Effort normal and breath sounds normal. No respiratory distress. She has no wheezes. She has no rales.   Abdominal: Soft. Bowel sounds are normal. She exhibits no distension. There is no tenderness. There is no rebound.   Musculoskeletal: She exhibits edema.   Neurological: She is alert and oriented to person, place, and time.   Nursing note and vitals reviewed.      Labs:    Chemistry        Component Value Date/Time     12/18/2019 0855    K 5.2 (H) 12/18/2019 0855     12/18/2019 0855    CO2 25 12/18/2019 0855    BUN 18 12/18/2019 0855    CREATININE 1.7 (H) 12/18/2019 0855    GLU 94 12/18/2019 0855        Component Value Date/Time    CALCIUM 8.3 (L) 12/04/2019 0637    ALKPHOS 55 12/04/2019 0637    AST 16 12/04/2019 0637    ALT 16 12/04/2019 0637    BILITOT 0.5 12/04/2019 0637    ESTGFRAFRICA 35.2 (A) 12/04/2019 0637    EGFRNONAA 30.6 (A) 12/04/2019 0637          Magnesium   Date Value Ref Range Status   12/04/2019 2.2 1.6 - 2.6 mg/dL Final     Lab Results   Component Value Date    WBC 5.13 12/04/2019    HGB 10.3 (L) 12/04/2019    HCT 33.8 (L) 12/04/2019     12/04/2019     Lab " Results   Component Value Date    INR 1.0 12/04/2019    INR 0.9 12/03/2019    INR 1.0 12/02/2019     BNP   Date Value Ref Range Status   12/18/2019 29 0 - 99 pg/mL Final     Comment:     Values of less than 100 pg/ml are consistent with non-CHF populations.   11/30/2019 20 0 - 99 pg/mL Final     Comment:     Values of less than 100 pg/ml are consistent with non-CHF populations.   04/15/2019 13 0 - 99 pg/mL Final     Comment:     Values of less than 100 pg/ml are consistent with non-CHF populations.     LD   Date Value Ref Range Status   05/04/2015 380 (H) 110 - 260 U/L Final     Comment:     Results are increased in hemolyzed samples.   05/01/2015 301 (H) 110 - 260 U/L Final     Comment:     Results are increased in hemolyzed samples.       Assessment:      1. Pulmonary hypertension    2. RVF (right ventricular failure)    3. Chronic respiratory failure with hypoxia        Plan:   WHO group 1  PH with WHO class II symptoms.   Continue current PH regimen   Resume her bumex at 1 mg BID  Recommend 2 gram sodium restriction and 1500cc fluid restriction.  Encourage physical activity with graded exercise program.  Requested patient to weigh themselves daily, and to notify us if their weight increases by more than 3 lbs in 1 day or 5 lbs in 1 week.   RTC in 2 weeks with labs     Chula Marinelli MD

## 2020-01-08 ENCOUNTER — TELEPHONE (OUTPATIENT)
Dept: TRANSPLANT | Facility: CLINIC | Age: 70
End: 2020-01-08

## 2020-01-08 ENCOUNTER — LAB VISIT (OUTPATIENT)
Dept: LAB | Facility: HOSPITAL | Age: 70
End: 2020-01-08
Attending: INTERNAL MEDICINE
Payer: MEDICARE

## 2020-01-08 DIAGNOSIS — I27.20 PULMONARY HYPERTENSION: ICD-10-CM

## 2020-01-08 LAB
ALBUMIN SERPL BCP-MCNC: 4 G/DL (ref 3.5–5.2)
ALP SERPL-CCNC: 52 U/L (ref 55–135)
ALT SERPL W/O P-5'-P-CCNC: 11 U/L (ref 10–44)
ANION GAP SERPL CALC-SCNC: 10 MMOL/L (ref 8–16)
AST SERPL-CCNC: 15 U/L (ref 10–40)
BILIRUB SERPL-MCNC: 0.3 MG/DL (ref 0.1–1)
BNP SERPL-MCNC: 21 PG/ML (ref 0–99)
BUN SERPL-MCNC: 43 MG/DL (ref 8–23)
CALCIUM SERPL-MCNC: 9.7 MG/DL (ref 8.7–10.5)
CHLORIDE SERPL-SCNC: 105 MMOL/L (ref 95–110)
CO2 SERPL-SCNC: 22 MMOL/L (ref 23–29)
CREAT SERPL-MCNC: 2.1 MG/DL (ref 0.5–1.4)
EST. GFR  (AFRICAN AMERICAN): 27 ML/MIN/1.73 M^2
EST. GFR  (NON AFRICAN AMERICAN): 24 ML/MIN/1.73 M^2
GLUCOSE SERPL-MCNC: 104 MG/DL (ref 70–110)
POTASSIUM SERPL-SCNC: 4.8 MMOL/L (ref 3.5–5.1)
PROT SERPL-MCNC: 6.8 G/DL (ref 6–8.4)
SODIUM SERPL-SCNC: 137 MMOL/L (ref 136–145)

## 2020-01-08 PROCEDURE — 80053 COMPREHEN METABOLIC PANEL: CPT

## 2020-01-08 PROCEDURE — 83880 ASSAY OF NATRIURETIC PEPTIDE: CPT

## 2020-01-08 PROCEDURE — 36415 COLL VENOUS BLD VENIPUNCTURE: CPT

## 2020-01-09 ENCOUNTER — TELEPHONE (OUTPATIENT)
Dept: TRANSPLANT | Facility: CLINIC | Age: 70
End: 2020-01-09

## 2020-01-09 DIAGNOSIS — I27.20 PULMONARY HYPERTENSION: ICD-10-CM

## 2020-01-09 DIAGNOSIS — I50.810 RVF (RIGHT VENTRICULAR FAILURE): ICD-10-CM

## 2020-01-10 ENCOUNTER — TELEPHONE (OUTPATIENT)
Dept: TRANSPLANT | Facility: CLINIC | Age: 70
End: 2020-01-10

## 2020-01-10 RX ORDER — BUMETANIDE 1 MG/1
1 TABLET ORAL DAILY
Qty: 180 TABLET | Refills: 3 | Status: SHIPPED | OUTPATIENT
Start: 2020-01-10

## 2020-01-10 NOTE — TELEPHONE ENCOUNTER
----- Message from Luly Cazares sent at 1/10/2020 10:18 AM CST -----  Contact: pt  Calling to reschedule appt 01/10/2020    Call Back : [phone]

## 2020-02-21 ENCOUNTER — LAB VISIT (OUTPATIENT)
Dept: LAB | Facility: HOSPITAL | Age: 70
End: 2020-02-21
Attending: INTERNAL MEDICINE
Payer: MEDICARE

## 2020-02-21 ENCOUNTER — OFFICE VISIT (OUTPATIENT)
Dept: TRANSPLANT | Facility: CLINIC | Age: 70
End: 2020-02-21
Payer: MEDICARE

## 2020-02-21 VITALS
WEIGHT: 199.06 LBS | DIASTOLIC BLOOD PRESSURE: 50 MMHG | HEIGHT: 66 IN | HEART RATE: 91 BPM | SYSTOLIC BLOOD PRESSURE: 118 MMHG | BODY MASS INDEX: 31.99 KG/M2 | OXYGEN SATURATION: 87 %

## 2020-02-21 DIAGNOSIS — I27.20 PULMONARY HYPERTENSION: Primary | ICD-10-CM

## 2020-02-21 DIAGNOSIS — I10 ESSENTIAL HYPERTENSION: ICD-10-CM

## 2020-02-21 DIAGNOSIS — I50.810 RVF (RIGHT VENTRICULAR FAILURE): ICD-10-CM

## 2020-02-21 DIAGNOSIS — I27.20 PULMONARY HYPERTENSION: ICD-10-CM

## 2020-02-21 LAB
ANION GAP SERPL CALC-SCNC: 9 MMOL/L (ref 8–16)
BNP SERPL-MCNC: 44 PG/ML (ref 0–99)
BUN SERPL-MCNC: 29 MG/DL (ref 8–23)
CALCIUM SERPL-MCNC: 8.9 MG/DL (ref 8.7–10.5)
CHLORIDE SERPL-SCNC: 111 MMOL/L (ref 95–110)
CO2 SERPL-SCNC: 23 MMOL/L (ref 23–29)
CREAT SERPL-MCNC: 1.9 MG/DL (ref 0.5–1.4)
EST. GFR  (AFRICAN AMERICAN): 30.8 ML/MIN/1.73 M^2
EST. GFR  (NON AFRICAN AMERICAN): 26.7 ML/MIN/1.73 M^2
GLUCOSE SERPL-MCNC: 99 MG/DL (ref 70–110)
POTASSIUM SERPL-SCNC: 4.5 MMOL/L (ref 3.5–5.1)
SODIUM SERPL-SCNC: 143 MMOL/L (ref 136–145)

## 2020-02-21 PROCEDURE — 1126F PR PAIN SEVERITY QUANTIFIED, NO PAIN PRESENT: ICD-10-PCS | Mod: S$GLB,,, | Performed by: INTERNAL MEDICINE

## 2020-02-21 PROCEDURE — 3074F PR MOST RECENT SYSTOLIC BLOOD PRESSURE < 130 MM HG: ICD-10-PCS | Mod: CPTII,S$GLB,, | Performed by: INTERNAL MEDICINE

## 2020-02-21 PROCEDURE — 99214 PR OFFICE/OUTPT VISIT, EST, LEVL IV, 30-39 MIN: ICD-10-PCS | Mod: S$GLB,,, | Performed by: INTERNAL MEDICINE

## 2020-02-21 PROCEDURE — 99999 PR PBB SHADOW E&M-EST. PATIENT-LVL III: CPT | Mod: PBBFAC,,, | Performed by: INTERNAL MEDICINE

## 2020-02-21 PROCEDURE — 1101F PR PT FALLS ASSESS DOC 0-1 FALLS W/OUT INJ PAST YR: ICD-10-PCS | Mod: CPTII,S$GLB,, | Performed by: INTERNAL MEDICINE

## 2020-02-21 PROCEDURE — 36415 COLL VENOUS BLD VENIPUNCTURE: CPT

## 2020-02-21 PROCEDURE — 1126F AMNT PAIN NOTED NONE PRSNT: CPT | Mod: S$GLB,,, | Performed by: INTERNAL MEDICINE

## 2020-02-21 PROCEDURE — 1159F MED LIST DOCD IN RCRD: CPT | Mod: S$GLB,,, | Performed by: INTERNAL MEDICINE

## 2020-02-21 PROCEDURE — 83880 ASSAY OF NATRIURETIC PEPTIDE: CPT

## 2020-02-21 PROCEDURE — 3078F DIAST BP <80 MM HG: CPT | Mod: CPTII,S$GLB,, | Performed by: INTERNAL MEDICINE

## 2020-02-21 PROCEDURE — 1159F PR MEDICATION LIST DOCUMENTED IN MEDICAL RECORD: ICD-10-PCS | Mod: S$GLB,,, | Performed by: INTERNAL MEDICINE

## 2020-02-21 PROCEDURE — 80048 BASIC METABOLIC PNL TOTAL CA: CPT

## 2020-02-21 PROCEDURE — 3078F PR MOST RECENT DIASTOLIC BLOOD PRESSURE < 80 MM HG: ICD-10-PCS | Mod: CPTII,S$GLB,, | Performed by: INTERNAL MEDICINE

## 2020-02-21 PROCEDURE — 99999 PR PBB SHADOW E&M-EST. PATIENT-LVL III: ICD-10-PCS | Mod: PBBFAC,,, | Performed by: INTERNAL MEDICINE

## 2020-02-21 PROCEDURE — 1101F PT FALLS ASSESS-DOCD LE1/YR: CPT | Mod: CPTII,S$GLB,, | Performed by: INTERNAL MEDICINE

## 2020-02-21 PROCEDURE — 3074F SYST BP LT 130 MM HG: CPT | Mod: CPTII,S$GLB,, | Performed by: INTERNAL MEDICINE

## 2020-02-21 PROCEDURE — 99214 OFFICE O/P EST MOD 30 MIN: CPT | Mod: S$GLB,,, | Performed by: INTERNAL MEDICINE

## 2020-02-21 NOTE — PROGRESS NOTES
Subjective:     HPI:  Ms. Davis is a very pleasant 68 y.o. AAF with severe pulmonary hypertension (WHO group 1), moderate to severe RV dysfunction, chronic hypercapnic respiratory failure, , obesity, active tobacco dependence, GLENN on CPAP, and diabetes who comes today for a post-hospital discharge F/U. She underwent a RHC that showed severe PH with normal PCWP. After which she was started on Tyvaso. Now on tyvaso 12 breaths 4x/day, opsumit and adempas 2.0 tid . She was recently discharged from the hospital after been treated for bowel obstruction (no surgical intervention was done). Today she reports doing better. We had resumed her Bumex.     Past Medical History:   Diagnosis Date    Asthma     COPD (chronic obstructive pulmonary disease)     Diastolic dysfunction with heart failure 5/20/2015    Occl RCA, high grade prox LAD and LCfx cath [facility] Dec 2014 5/11/2015    Pulmonary HTN     Sleep apnea      Past Surgical History:   Procedure Laterality Date    BACK SURGERY      HYSTERECTOMY      RIGHT HEART CATHETERIZATION Right 9/17/2018    Procedure: HEART CATH-RIGHT;  Surgeon: Le Salmeron MD;  Location: [facility] CATH LAB;  Service: Cardiology;  Laterality: Right;    RIGHT HEART CATHETERIZATION Right 7/8/2019    Procedure: HEART CATH-RIGHT;  Surgeon: Le Salmeron MD;  Location: [facility] CATH LAB;  Service: Cardiology;  Laterality: Right;     OB History    None       Review of Systems   Constitution: Negative. Negative for chills, decreased appetite, diaphoresis, fever, malaise/fatigue, night sweats, weight gain and weight loss.   Eyes: Negative.    Cardiovascular: Positive for dyspnea on exertion. Negative for chest pain, claudication, cyanosis, irregular heartbeat, leg swelling, near-syncope, orthopnea, palpitations, paroxysmal nocturnal dyspnea and syncope.   Respiratory: Negative for cough, hemoptysis and shortness of breath.    Endocrine: Negative.    Hematologic/Lymphatic: Negative.    Skin:  "Negative for color change, dry skin and nail changes.   Musculoskeletal: Negative.    Gastrointestinal: Negative.    Genitourinary: Negative.    Neurological: Negative for weakness.       Objective:   Blood pressure (!) 118/50, pulse 91, height 5' 6" (1.676 m), weight 90.3 kg (199 lb 1.2 oz), SpO2 (!) 87 %.body mass index is 32.13 kg/m².  Physical Exam   Constitutional: She is oriented to person, place, and time. Vital signs are normal. She appears well-developed and well-nourished.   HENT:   Head: Normocephalic.   Eyes: Pupils are equal, round, and reactive to light.   Neck: Neck supple. No JVD present.   Cardiovascular: Regular rhythm and normal heart sounds. Tachycardia present. PMI is not displaced. Exam reveals no gallop and no friction rub.   No murmur heard.  Pulmonary/Chest: Effort normal and breath sounds normal. No respiratory distress. She has no wheezes. She has no rales.   Abdominal: Soft. Bowel sounds are normal. She exhibits no distension. There is no tenderness. There is no rebound.   Musculoskeletal: She exhibits no edema.   Neurological: She is alert and oriented to person, place, and time.   Nursing note and vitals reviewed.      Labs:    Chemistry        Component Value Date/Time     01/08/2020 1000    K 4.8 01/08/2020 1000     01/08/2020 1000    CO2 22 (L) 01/08/2020 1000    BUN 43 (H) 01/08/2020 1000    CREATININE 2.1 (H) 01/08/2020 1000     01/08/2020 1000        Component Value Date/Time    CALCIUM 9.7 01/08/2020 1000    ALKPHOS 52 (L) 01/08/2020 1000    AST 15 01/08/2020 1000    ALT 11 01/08/2020 1000    BILITOT 0.3 01/08/2020 1000    ESTGFRAFRICA 27 (A) 01/08/2020 1000    EGFRNONAA 24 (A) 01/08/2020 1000          Magnesium   Date Value Ref Range Status   12/18/2019 2.2 1.6 - 2.6 mg/dL Final     Lab Results   Component Value Date    WBC 4.15 12/18/2019    HGB 10.6 (L) 12/18/2019    HCT 35.8 (L) 12/18/2019     12/18/2019     Lab Results   Component Value Date    INR " 1.0 12/04/2019    INR 0.9 12/03/2019    INR 1.0 12/02/2019     BNP   Date Value Ref Range Status   01/08/2020 21 0 - 99 pg/mL Final     Comment:     Values of less than 100 pg/ml are consistent with non-CHF populations.   12/18/2019 29 0 - 99 pg/mL Final     Comment:     Values of less than 100 pg/ml are consistent with non-CHF populations.   11/30/2019 20 0 - 99 pg/mL Final     Comment:     Values of less than 100 pg/ml are consistent with non-CHF populations.     LD   Date Value Ref Range Status   05/04/2015 380 (H) 110 - 260 U/L Final     Comment:     Results are increased in hemolyzed samples.   05/01/2015 301 (H) 110 - 260 U/L Final     Comment:     Results are increased in hemolyzed samples.       Assessment:      1. Pulmonary hypertension    2. Essential hypertension    3. RVF (right ventricular failure)        Plan:   WHO group 1  PH with WHO class II symptoms.   Continue current PH regimen   Cotninue bumex at 1 mg.  BMP and BNP today  Recommend 2 gram sodium restriction and 1500cc fluid restriction.  Encourage physical activity with graded exercise program.  Requested patient to weigh themselves daily, and to notify us if their weight increases by more than 3 lbs in 1 day or 5 lbs in 1 week.   RTC in 2 weeks with labs      Chula Marinelli MD

## 2020-04-02 ENCOUNTER — TELEPHONE (OUTPATIENT)
Dept: TRANSPLANT | Facility: CLINIC | Age: 70
End: 2020-04-02

## 2020-04-23 ENCOUNTER — TELEPHONE (OUTPATIENT)
Dept: TRANSPLANT | Facility: CLINIC | Age: 70
End: 2020-04-23

## 2020-04-23 NOTE — TELEPHONE ENCOUNTER
"Spoke w/pt, to see if she would like to change upcoming visit to virtual visit. Pt states that she is "seeing a kidney doctor and having an ultrasound" and will contact us with that information, which she wants Dr Marinelli to have. She will call scheduling to reschedule her upcoming appointment w/us or switch to virtual visit.  "

## 2020-05-11 ENCOUNTER — RESEARCH ENCOUNTER (OUTPATIENT)
Dept: RESEARCH | Facility: HOSPITAL | Age: 70
End: 2020-05-11

## 2020-05-11 NOTE — PROGRESS NOTES
OPUS registry/ [id number]    OPUS registry closed per sponsor on 4/24/2020. Pt was mailed letter about study closure on 4/29/2020.

## 2020-06-26 ENCOUNTER — TELEPHONE (OUTPATIENT)
Dept: TRANSPLANT | Facility: CLINIC | Age: 70
End: 2020-06-26

## 2020-06-26 NOTE — TELEPHONE ENCOUNTER
Cynthia GAMBOA [facility] Pulmonary Hypertension Clinical   Caller: Unspecified (Today, 10:32 AM)             Pt would like a call in ref to issues she's having with [facility]. She would like to know if there's another company she can be referred to.Please call her at [phone].     Thanks        -------    Spoke w/pt, who reports that portable concentrator was no longer working. She contacted [facility], and tech came out and left battery. Pt told tech to take the battery and the concentrator w/him. She states tech did not show pt how to replace concentrator battery, nor did tech make sure that the issue was the battery. Pt states that she was ok w/tech taking concentrator, but that she needed back up tanks, which she was denied.     This RN contacted contacted Genny, at [facility], and discussed above. Per Genny, these events should not have transpired as they did. Tech should have shown pt how to replace battery, and should have made sure that concentrator was operational. She is discussing w/tech and asking for return to pt's home for same. If pt needs concentrator serviced, order from our office would be necessary, as well as order for back up tanks while concentrator is being serviced. Contacted pt and reported back on all. She will call me if there are further issues/concerns.

## 2020-08-10 ENCOUNTER — DOCUMENTATION ONLY (OUTPATIENT)
Dept: TRANSPLANT | Facility: HOSPITAL | Age: 70
End: 2020-08-10

## 2020-08-10 NOTE — PROGRESS NOTES
PI hand-off note, [facility] Registry    Patients Study ID: [id number]     Last visit completed; 9 month visit (2/21/20)   Missed 12 mo visit    Next visit coming up: 18 mo visit, window opens 10/30/20    Dose of Uptravi: 1600mcg bid

## 2020-08-20 ENCOUNTER — TELEPHONE (OUTPATIENT)
Dept: TRANSPLANT | Facility: CLINIC | Age: 70
End: 2020-08-20

## 2020-08-20 NOTE — TELEPHONE ENCOUNTER
----- Message from Anai Kauffman sent at 8/20/2020  9:47 AM CDT -----  Regarding: Requesting a callback  Contact: Bessie  Type:  Needs Medical Advice    Who Called: Bessie  Would the patient rather a call back or a response via [facility]? Callback  Best Call Back Number: [phone]  Additional Information: Wanted to provide an update on her Kidney test

## 2020-09-02 ENCOUNTER — TELEPHONE (OUTPATIENT)
Dept: TRANSPLANT | Facility: CLINIC | Age: 70
End: 2020-09-02

## 2020-10-27 NOTE — TELEPHONE ENCOUNTER
Contacted pt to discuss initiation of Uptravi, per Dr Salmeron (pt had RHC yesterday). Plan is for pt to stop Tyvaso and start Uptravi next day. Pt is familiar w/SP and role of SP nurse d/t current therapies. Noted that referral for Uptravi would [facility] to  [facility], and they would contact her to arrange for her to sign enrollment form. Discussed that Uptravi is in same family as Tyvaso, but different from Adempas, although also a titrated medication. Titration was briefly discussed, and it was noted that while weekly titrations were the goal/optimal, if patient had SEs of N/V/D, or any other, titration could be done more slowly. Pt understands that she will not start the medication until the SP nurse makes the visit. All questions/concerns answered and addressed to patient satisfaction. Will submit referral for Uptravi tomorrow AM.   
[id number]

## 2020-11-02 ENCOUNTER — TELEPHONE (OUTPATIENT)
Dept: TRANSPLANT | Facility: CLINIC | Age: 70
End: 2020-11-02

## 2020-11-02 NOTE — TELEPHONE ENCOUNTER
----- Message from Colleen Prieto MA sent at 11/2/2020  4:24 PM CST -----  Regarding: FW: Refill    ----- Message -----  From: Shania Linton  Sent: 11/2/2020   3:24 PM CST  To: [facility]  Subject: Refill                                           Pt needs the following med urgently if can call to send refill request. Thanks    riociguat (ADEMPAS) 2.5 mg tablet  [phone]- [facility] [place]

## 2020-11-06 ENCOUNTER — TELEPHONE (OUTPATIENT)
Dept: TRANSPLANT | Facility: CLINIC | Age: 70
End: 2020-11-06

## 2020-11-06 RX ORDER — MACITENTAN 10 MG/1
10 TABLET, FILM COATED ORAL DAILY
Qty: 30 TABLET | Refills: 11 | Status: CANCELLED | OUTPATIENT
Start: 2020-11-06

## 2020-11-06 NOTE — TELEPHONE ENCOUNTER
Spoke w/patient this morning regarding this issue. She was deferred to provider who prescribed BIPAP. Pt stated she would contact PCP and/or DME regarding supplies.

## 2020-11-06 NOTE — TELEPHONE ENCOUNTER
----- Message from Cynthia Davenport sent at 11/6/2020 12:16 PM CST -----  Regarding: Supplies  Pt would like a call in ref to getting supplies (filters) for her Bipap machine. Please call her at [phone]    Thanks

## 2020-11-10 ENCOUNTER — TELEPHONE (OUTPATIENT)
Dept: TRANSPLANT | Facility: CLINIC | Age: 70
End: 2020-11-10

## 2020-11-10 NOTE — TELEPHONE ENCOUNTER
----- Message from Katerin Villarreal sent at 11/10/2020  3:33 PM CST -----  Caller says she uses [facility].   [phone]    Needs  you to ask for Shavon who is her Respitory Therapist.    Caller says she needs the filters for her BIPap machine.      She needs enough filters to last until pt. Can get in to see you.  Waiting for you to call with the order.

## 2020-11-10 NOTE — TELEPHONE ENCOUNTER
"Spoke with Mrs. Davis on the phone. Explained that our physicians that see her for Pulmonary Hypertension do not do prescriptions for bipap/CPAP supplies. She would need to speak to the physician who prescribed the biPAP/cPAP. She states that she has tried to call that physician but she can't get past the person at the . She said that "everyone just wants her to die." Tried to reassure patient.   PH Coordinator called [facility]. Emilie at [employer] said that the patient should not be calling us for the prescription. She stated that patient "is trying to call any doctor for the prescription" so she does not have to come in for an appt.    PH Coordinator attempted to close the loop with the patient. Advised that she would need an appointment with her PCP. Patient states that her physician told her "not to come in because of Covid and now people want her to go to the doctor and die." She stated that we prescribe all of her pulmonary hypertension medicine and asked the coordinator to talk to Dr. Solorio. She also stated that she would try to call her other doctor. Tried to reassure patient but she still seemed agitated. Noted that she transferred her care to [facility] because she was told they were the best but she "begs to differ."  "

## 2020-12-09 ENCOUNTER — LAB VISIT (OUTPATIENT)
Dept: LAB | Facility: HOSPITAL | Age: 70
End: 2020-12-09
Attending: INTERNAL MEDICINE
Payer: MEDICARE

## 2020-12-09 ENCOUNTER — OFFICE VISIT (OUTPATIENT)
Dept: TRANSPLANT | Facility: CLINIC | Age: 70
End: 2020-12-09
Payer: MEDICARE

## 2020-12-09 VITALS
BODY MASS INDEX: 28.98 KG/M2 | HEIGHT: 66 IN | DIASTOLIC BLOOD PRESSURE: 61 MMHG | SYSTOLIC BLOOD PRESSURE: 118 MMHG | WEIGHT: 180.31 LBS | HEART RATE: 80 BPM

## 2020-12-09 DIAGNOSIS — I27.20 PULMONARY HYPERTENSION: Primary | ICD-10-CM

## 2020-12-09 DIAGNOSIS — I27.9 CHRONIC PULMONARY HEART DISEASE: ICD-10-CM

## 2020-12-09 DIAGNOSIS — I50.810 RVF (RIGHT VENTRICULAR FAILURE): ICD-10-CM

## 2020-12-09 DIAGNOSIS — I27.20 PULMONARY HYPERTENSION: ICD-10-CM

## 2020-12-09 DIAGNOSIS — I10 ESSENTIAL HYPERTENSION: ICD-10-CM

## 2020-12-09 LAB
ALBUMIN SERPL BCP-MCNC: 3.9 G/DL (ref 3.5–5.2)
ALP SERPL-CCNC: 62 U/L (ref 55–135)
ALT SERPL W/O P-5'-P-CCNC: 22 U/L (ref 10–44)
ANION GAP SERPL CALC-SCNC: 10 MMOL/L (ref 8–16)
AST SERPL-CCNC: 17 U/L (ref 10–40)
BILIRUB SERPL-MCNC: 0.3 MG/DL (ref 0.1–1)
BNP SERPL-MCNC: 47 PG/ML (ref 0–99)
BUN SERPL-MCNC: 25 MG/DL (ref 8–23)
CALCIUM SERPL-MCNC: 8.7 MG/DL (ref 8.7–10.5)
CHLORIDE SERPL-SCNC: 106 MMOL/L (ref 95–110)
CO2 SERPL-SCNC: 26 MMOL/L (ref 23–29)
CREAT SERPL-MCNC: 1.5 MG/DL (ref 0.5–1.4)
EST. GFR  (AFRICAN AMERICAN): 40.7 ML/MIN/1.73 M^2
EST. GFR  (NON AFRICAN AMERICAN): 35.3 ML/MIN/1.73 M^2
GLUCOSE SERPL-MCNC: 89 MG/DL (ref 70–110)
POTASSIUM SERPL-SCNC: 3.8 MMOL/L (ref 3.5–5.1)
PROT SERPL-MCNC: 6.8 G/DL (ref 6–8.4)
SODIUM SERPL-SCNC: 142 MMOL/L (ref 136–145)

## 2020-12-09 PROCEDURE — 80053 COMPREHEN METABOLIC PANEL: CPT

## 2020-12-09 PROCEDURE — 99214 PR OFFICE/OUTPT VISIT, EST, LEVL IV, 30-39 MIN: ICD-10-PCS | Mod: S$GLB,,, | Performed by: INTERNAL MEDICINE

## 2020-12-09 PROCEDURE — 1126F PR PAIN SEVERITY QUANTIFIED, NO PAIN PRESENT: ICD-10-PCS | Mod: S$GLB,,, | Performed by: INTERNAL MEDICINE

## 2020-12-09 PROCEDURE — 99214 OFFICE O/P EST MOD 30 MIN: CPT | Mod: S$GLB,,, | Performed by: INTERNAL MEDICINE

## 2020-12-09 PROCEDURE — 99999 PR PBB SHADOW E&M-EST. PATIENT-LVL V: ICD-10-PCS | Mod: PBBFAC,,, | Performed by: INTERNAL MEDICINE

## 2020-12-09 PROCEDURE — 3288F PR FALLS RISK ASSESSMENT DOCUMENTED: ICD-10-PCS | Mod: CPTII,S$GLB,, | Performed by: INTERNAL MEDICINE

## 2020-12-09 PROCEDURE — 1101F PT FALLS ASSESS-DOCD LE1/YR: CPT | Mod: CPTII,S$GLB,, | Performed by: INTERNAL MEDICINE

## 2020-12-09 PROCEDURE — 1126F AMNT PAIN NOTED NONE PRSNT: CPT | Mod: S$GLB,,, | Performed by: INTERNAL MEDICINE

## 2020-12-09 PROCEDURE — 99999 PR PBB SHADOW E&M-EST. PATIENT-LVL V: CPT | Mod: PBBFAC,,, | Performed by: INTERNAL MEDICINE

## 2020-12-09 PROCEDURE — 3008F PR BODY MASS INDEX (BMI) DOCUMENTED: ICD-10-PCS | Mod: CPTII,S$GLB,, | Performed by: INTERNAL MEDICINE

## 2020-12-09 PROCEDURE — 83880 ASSAY OF NATRIURETIC PEPTIDE: CPT

## 2020-12-09 PROCEDURE — 1159F MED LIST DOCD IN RCRD: CPT | Mod: S$GLB,,, | Performed by: INTERNAL MEDICINE

## 2020-12-09 PROCEDURE — 1101F PR PT FALLS ASSESS DOC 0-1 FALLS W/OUT INJ PAST YR: ICD-10-PCS | Mod: CPTII,S$GLB,, | Performed by: INTERNAL MEDICINE

## 2020-12-09 PROCEDURE — 36415 COLL VENOUS BLD VENIPUNCTURE: CPT

## 2020-12-09 PROCEDURE — 3078F DIAST BP <80 MM HG: CPT | Mod: CPTII,S$GLB,, | Performed by: INTERNAL MEDICINE

## 2020-12-09 PROCEDURE — 3008F BODY MASS INDEX DOCD: CPT | Mod: CPTII,S$GLB,, | Performed by: INTERNAL MEDICINE

## 2020-12-09 PROCEDURE — 3074F PR MOST RECENT SYSTOLIC BLOOD PRESSURE < 130 MM HG: ICD-10-PCS | Mod: CPTII,S$GLB,, | Performed by: INTERNAL MEDICINE

## 2020-12-09 PROCEDURE — 3074F SYST BP LT 130 MM HG: CPT | Mod: CPTII,S$GLB,, | Performed by: INTERNAL MEDICINE

## 2020-12-09 PROCEDURE — 1157F ADVNC CARE PLAN IN RCRD: CPT | Mod: S$GLB,,, | Performed by: INTERNAL MEDICINE

## 2020-12-09 PROCEDURE — 1159F PR MEDICATION LIST DOCUMENTED IN MEDICAL RECORD: ICD-10-PCS | Mod: S$GLB,,, | Performed by: INTERNAL MEDICINE

## 2020-12-09 PROCEDURE — 1157F PR ADVANCE CARE PLAN OR EQUIV PRESENT IN MEDICAL RECORD: ICD-10-PCS | Mod: S$GLB,,, | Performed by: INTERNAL MEDICINE

## 2020-12-09 PROCEDURE — 3078F PR MOST RECENT DIASTOLIC BLOOD PRESSURE < 80 MM HG: ICD-10-PCS | Mod: CPTII,S$GLB,, | Performed by: INTERNAL MEDICINE

## 2020-12-09 PROCEDURE — 3288F FALL RISK ASSESSMENT DOCD: CPT | Mod: CPTII,S$GLB,, | Performed by: INTERNAL MEDICINE

## 2020-12-09 RX ORDER — FERROUS SULFATE 325(65) MG
1 TABLET ORAL DAILY
COMMUNITY
Start: 2020-11-21

## 2020-12-09 RX ORDER — PANTOPRAZOLE SODIUM 40 MG/1
TABLET, DELAYED RELEASE ORAL
COMMUNITY
Start: 2020-09-21 | End: 2021-01-01

## 2020-12-09 RX ORDER — MACITENTAN 10 MG/1
10 TABLET, FILM COATED ORAL DAILY
Qty: 30 TABLET | Refills: 11 | Status: CANCELLED | OUTPATIENT
Start: 2020-12-09

## 2020-12-09 RX ORDER — FOLIC ACID 1 MG/1
1000 TABLET ORAL DAILY
COMMUNITY
Start: 2020-09-17

## 2020-12-09 NOTE — PROGRESS NOTES
Subjective:     HPI:  Ms. Davis is a very pleasant 68 y.o. AAF with severe pulmonary hypertension (WHO group 1), moderate to severe RV dysfunction, chronic hypercapnic respiratory failure, , obesity, active tobacco dependence, GLENN on CPAP, and diabetes who comes today for a F/U. She underwent a RHC that showed severe PH with normal PCWP. On uptravi, opsumit 10 mg dailyand adempas 2.5 tid. Labs are pending.     Past Medical History:   Diagnosis Date    Asthma     COPD (chronic obstructive pulmonary disease)     Diastolic dysfunction with heart failure 5/20/2015    Occl RCA, high grade prox LAD and LCfx cath [facility] Dec 2014 5/11/2015    Pulmonary HTN     Sleep apnea      Past Surgical History:   Procedure Laterality Date    BACK SURGERY      HYSTERECTOMY      RIGHT HEART CATHETERIZATION Right 9/17/2018    Procedure: HEART CATH-RIGHT;  Surgeon: Le Salmeron MD;  Location: [facility] CATH LAB;  Service: Cardiology;  Laterality: Right;    RIGHT HEART CATHETERIZATION Right 7/8/2019    Procedure: HEART CATH-RIGHT;  Surgeon: Le Salmeron MD;  Location: [facility] CATH LAB;  Service: Cardiology;  Laterality: Right;     OB History    No obstetric history on file.       Review of Systems   Constitution: Negative. Negative for chills, decreased appetite, diaphoresis, fever, malaise/fatigue, night sweats, weight gain and weight loss.   Eyes: Negative.    Cardiovascular: Positive for dyspnea on exertion. Negative for chest pain, claudication, cyanosis, irregular heartbeat, leg swelling, near-syncope, orthopnea, palpitations, paroxysmal nocturnal dyspnea and syncope.   Respiratory: Negative for cough, hemoptysis and shortness of breath.    Endocrine: Negative.    Hematologic/Lymphatic: Negative.    Skin: Negative for color change, dry skin and nail changes.   Musculoskeletal: Negative.    Gastrointestinal: Negative.    Genitourinary: Negative.    Neurological: Negative for weakness.       Objective:   Blood pressure  "118/61, pulse 80, height 5' 6" (1.676 m), weight 81.8 kg (180 lb 5.4 oz).body mass index is 29.11 kg/m².  Physical Exam   Constitutional: She is oriented to person, place, and time. Vital signs are normal. She appears well-developed and well-nourished.   HENT:   Head: Normocephalic.   Eyes: Pupils are equal, round, and reactive to light.   Neck: Neck supple. No JVD present.   Cardiovascular: Normal rate, regular rhythm and normal heart sounds. PMI is not displaced. Exam reveals no gallop and no friction rub.   No murmur heard.  Loud S2   Pulmonary/Chest: Effort normal and breath sounds normal. No respiratory distress. She has no wheezes. She has no rales.   Abdominal: Soft. Bowel sounds are normal. She exhibits no distension. There is no abdominal tenderness. There is no rebound.   Musculoskeletal:         General: No edema.   Neurological: She is alert and oriented to person, place, and time.   Nursing note and vitals reviewed.      Labs:    Chemistry        Component Value Date/Time     02/21/2020 0937    K 4.5 02/21/2020 0937     (H) 02/21/2020 0937    CO2 23 02/21/2020 0937    BUN 29 (H) 02/21/2020 0937    CREATININE 1.9 (H) 02/21/2020 0937    GLU 99 02/21/2020 0937        Component Value Date/Time    CALCIUM 8.9 02/21/2020 0937    ALKPHOS 52 (L) 01/08/2020 1000    AST 15 01/08/2020 1000    ALT 11 01/08/2020 1000    BILITOT 0.3 01/08/2020 1000    ESTGFRAFRICA 30.8 (A) 02/21/2020 0937    EGFRNONAA 26.7 (A) 02/21/2020 0937          Magnesium   Date Value Ref Range Status   12/18/2019 2.2 1.6 - 2.6 mg/dL Final     Lab Results   Component Value Date    WBC 4.15 12/18/2019    HGB 10.6 (L) 12/18/2019    HCT 35.8 (L) 12/18/2019     12/18/2019     Lab Results   Component Value Date    INR 1.0 12/04/2019    INR 0.9 12/03/2019    INR 1.0 12/02/2019     BNP   Date Value Ref Range Status   02/21/2020 44 0 - 99 pg/mL Final     Comment:     Values of less than 100 pg/ml are consistent with non-CHF " populations.   01/08/2020 21 0 - 99 pg/mL Final     Comment:     Values of less than 100 pg/ml are consistent with non-CHF populations.   12/18/2019 29 0 - 99 pg/mL Final     Comment:     Values of less than 100 pg/ml are consistent with non-CHF populations.     LD   Date Value Ref Range Status   05/04/2015 380 (H) 110 - 260 U/L Final     Comment:     Results are increased in hemolyzed samples.   05/01/2015 301 (H) 110 - 260 U/L Final     Comment:     Results are increased in hemolyzed samples.       Assessment:      1. Pulmonary hypertension    2. RVF (right ventricular failure)    3. Essential hypertension    4. Chronic pulmonary heart disease        Plan:   WHO group 1  PH with WHO class II symptoms.   Continue current PH regimen   Continue current dose of bumex   Recommend 2 gram sodium restriction and 1500cc fluid restriction.  Encourage physical activity with graded exercise program.  Requested patient to weigh themselves daily, and to notify us if their weight increases by more than 3 lbs in 1 day or 5 lbs in 1 week.   RTC in 3 months with Echo, 6 MWT and  labs      Chula Marinelli MD

## 2021-01-01 ENCOUNTER — HOSPITAL ENCOUNTER (OUTPATIENT)
Dept: CARDIOLOGY | Facility: HOSPITAL | Age: 71
Discharge: HOME | End: 2021-03-09
Attending: INTERNAL MEDICINE
Payer: MEDICARE

## 2021-01-01 ENCOUNTER — OFFICE VISIT (OUTPATIENT)
Dept: TRANSPLANT | Facility: CLINIC | Age: 71
End: 2021-01-01
Payer: MEDICARE

## 2021-01-01 ENCOUNTER — HOSPITAL ENCOUNTER (OUTPATIENT)
Dept: PULMONOLOGY | Facility: CLINIC | Age: 71
Discharge: HOME | End: 2021-03-09
Payer: MEDICARE

## 2021-01-01 ENCOUNTER — HOSPITAL ENCOUNTER (OUTPATIENT)
Dept: PULMONOLOGY | Facility: CLINIC | Age: 71
Discharge: HOME | End: 2021-10-15
Payer: MEDICARE

## 2021-01-01 ENCOUNTER — TELEPHONE (OUTPATIENT)
Dept: TRANSPLANT | Facility: CLINIC | Age: 71
End: 2021-01-01

## 2021-01-01 ENCOUNTER — HOSPITAL ENCOUNTER (OUTPATIENT)
Dept: PULMONOLOGY | Facility: CLINIC | Age: 71
Discharge: HOME | End: 2021-06-11
Payer: MEDICARE

## 2021-01-01 ENCOUNTER — EPISODE CHANGES (OUTPATIENT)
Dept: TRANSPLANT | Facility: CLINIC | Age: 71
End: 2021-01-01

## 2021-01-01 ENCOUNTER — IMMUNIZATION (OUTPATIENT)
Dept: PRIMARY CARE CLINIC | Facility: CLINIC | Age: 71
End: 2021-01-01
Payer: MEDICARE

## 2021-01-01 ENCOUNTER — DOCUMENTATION ONLY (OUTPATIENT)
Dept: TRANSPLANT | Facility: CLINIC | Age: 71
End: 2021-01-01

## 2021-01-01 ENCOUNTER — TELEPHONE (OUTPATIENT)
Dept: TRANSPLANT | Facility: CLINIC | Age: 71
End: 2021-01-01
Payer: MEDICARE

## 2021-01-01 VITALS
OXYGEN SATURATION: 97 % | BODY MASS INDEX: 28.81 KG/M2 | WEIGHT: 180 LBS | HEART RATE: 72 BPM | WEIGHT: 179.25 LBS | HEIGHT: 66 IN | SYSTOLIC BLOOD PRESSURE: 112 MMHG | HEIGHT: 66 IN | DIASTOLIC BLOOD PRESSURE: 56 MMHG | BODY MASS INDEX: 28.93 KG/M2

## 2021-01-01 VITALS
SYSTOLIC BLOOD PRESSURE: 120 MMHG | HEIGHT: 66 IN | HEART RATE: 75 BPM | BODY MASS INDEX: 29.27 KG/M2 | WEIGHT: 182.13 LBS | HEIGHT: 66 IN | DIASTOLIC BLOOD PRESSURE: 58 MMHG | WEIGHT: 178 LBS | BODY MASS INDEX: 28.61 KG/M2

## 2021-01-01 VITALS
WEIGHT: 182.75 LBS | HEIGHT: 66 IN | SYSTOLIC BLOOD PRESSURE: 109 MMHG | DIASTOLIC BLOOD PRESSURE: 53 MMHG | HEIGHT: 66 IN | OXYGEN SATURATION: 97 % | BODY MASS INDEX: 29.37 KG/M2 | HEART RATE: 85 BPM | BODY MASS INDEX: 28.61 KG/M2 | WEIGHT: 178 LBS

## 2021-01-01 VITALS
HEART RATE: 70 BPM | DIASTOLIC BLOOD PRESSURE: 60 MMHG | SYSTOLIC BLOOD PRESSURE: 120 MMHG | HEIGHT: 66 IN | BODY MASS INDEX: 28.93 KG/M2 | WEIGHT: 180 LBS

## 2021-01-01 DIAGNOSIS — I27.9 CHRONIC PULMONARY HEART DISEASE: ICD-10-CM

## 2021-01-01 DIAGNOSIS — J96.11 CHRONIC RESPIRATORY FAILURE WITH HYPOXIA: ICD-10-CM

## 2021-01-01 DIAGNOSIS — I27.20 PULMONARY HYPERTENSION: Primary | ICD-10-CM

## 2021-01-01 DIAGNOSIS — I50.32 CHRONIC DIASTOLIC HEART FAILURE: ICD-10-CM

## 2021-01-01 DIAGNOSIS — G47.33 OBSTRUCTIVE SLEEP APNEA SYNDROME: ICD-10-CM

## 2021-01-01 DIAGNOSIS — I27.9 CHRONIC PULMONARY HEART DISEASE: Primary | ICD-10-CM

## 2021-01-01 DIAGNOSIS — Z23 NEED FOR VACCINATION: Primary | ICD-10-CM

## 2021-01-01 DIAGNOSIS — I50.810 RVF (RIGHT VENTRICULAR FAILURE): ICD-10-CM

## 2021-01-01 DIAGNOSIS — I27.20 PULMONARY HYPERTENSION: ICD-10-CM

## 2021-01-01 DIAGNOSIS — Z79.899 POLYPHARMACY: Primary | ICD-10-CM

## 2021-01-01 DIAGNOSIS — I10 ESSENTIAL HYPERTENSION: ICD-10-CM

## 2021-01-01 LAB
ASCENDING AORTA: 3.18 CM
AV INDEX (PROSTH): 0.72
AV MEAN GRADIENT: 8 MMHG
AV PEAK GRADIENT: 18 MMHG
AV VALVE AREA: 2.16 CM2
AV VELOCITY RATIO: 0.67
BSA FOR ECHO PROCEDURE: 1.95 M2
CV ECHO LV RWT: 0.33 CM
DOP CALC AO PEAK VEL: 2.12 M/S
DOP CALC AO VTI: 42.5 CM
DOP CALC LVOT AREA: 3 CM2
DOP CALC LVOT DIAMETER: 1.96 CM
DOP CALC LVOT PEAK VEL: 1.43 M/S
DOP CALC LVOT STROKE VOLUME: 92.01 CM3
DOP CALCLVOT PEAK VEL VTI: 30.51 CM
E WAVE DECELERATION TIME: 171.97 MSEC
E/A RATIO: 0.96
E/E' RATIO: 9.7 M/S
ECHO LV POSTERIOR WALL: 0.91 CM (ref 0.6–1.1)
FRACTIONAL SHORTENING: 39 % (ref 28–44)
INTERVENTRICULAR SEPTUM: 0.79 CM (ref 0.6–1.1)
IVRT: 62.8 MSEC
LA MAJOR: 6.18 CM
LA MINOR: 6.43 CM
LA WIDTH: 4.56 CM
LEFT ATRIUM SIZE: 4 CM
LEFT ATRIUM VOLUME INDEX MOD: 45.5 ML/M2
LEFT ATRIUM VOLUME INDEX: 51.2 ML/M2
LEFT ATRIUM VOLUME MOD: 86.92 CM3
LEFT ATRIUM VOLUME: 97.71 CM3
LEFT INTERNAL DIMENSION IN SYSTOLE: 3.38 CM (ref 2.1–4)
LEFT VENTRICLE DIASTOLIC VOLUME INDEX: 77.14 ML/M2
LEFT VENTRICLE DIASTOLIC VOLUME: 147.33 ML
LEFT VENTRICLE MASS INDEX: 90 G/M2
LEFT VENTRICLE SYSTOLIC VOLUME INDEX: 24.5 ML/M2
LEFT VENTRICLE SYSTOLIC VOLUME: 46.86 ML
LEFT VENTRICULAR INTERNAL DIMENSION IN DIASTOLE: 5.5 CM (ref 3.5–6)
LEFT VENTRICULAR MASS: 172.72 G
LV LATERAL E/E' RATIO: 8.08 M/S
LV SEPTAL E/E' RATIO: 12.13 M/S
MV A" WAVE DURATION": 17.98 MSEC
MV PEAK A VEL: 1.01 M/S
MV PEAK E VEL: 0.97 M/S
MV STENOSIS PRESSURE HALF TIME: 49.87 MS
MV VALVE AREA P 1/2 METHOD: 4.41 CM2
PISA TR MAX VEL: 4.1 M/S
PULM VEIN S/D RATIO: 0.92
PV PEAK D VEL: 0.63 M/S
PV PEAK S VEL: 0.58 M/S
RA MAJOR: 5.66 CM
RA PRESSURE: 3 MMHG
RA WIDTH: 4.87 CM
RIGHT ATRIAL AREA: 22 CM2
RIGHT VENTRICULAR END-DIASTOLIC DIMENSION: 4.9 CM
RV TISSUE DOPPLER FREE WALL SYSTOLIC VELOCITY 1 (APICAL 4 CHAMBER VIEW): 16.52 CM/S
SINUS: 3.35 CM
STJ: 3.09 CM
TDI LATERAL: 0.12 M/S
TDI SEPTAL: 0.08 M/S
TDI: 0.1 M/S
TR MAX PG: 67 MMHG
TRICUSPID ANNULAR PLANE SYSTOLIC EXCURSION: 2.27 CM
TV REST PULMONARY ARTERY PRESSURE: 70 MMHG

## 2021-01-01 PROCEDURE — 91301 COVID-19, MRNA, LNP-S, PF, 100 MCG/0.5 ML DOSE VACCINE: CPT | Mod: PBBFAC | Performed by: INTERNAL MEDICINE

## 2021-01-01 PROCEDURE — 1126F AMNT PAIN NOTED NONE PRSNT: CPT | Mod: CPTII,S$GLB,, | Performed by: INTERNAL MEDICINE

## 2021-01-01 PROCEDURE — 3074F SYST BP LT 130 MM HG: CPT | Mod: CPTII,S$GLB,, | Performed by: INTERNAL MEDICINE

## 2021-01-01 PROCEDURE — 3288F FALL RISK ASSESSMENT DOCD: CPT | Mod: CPTII,S$GLB,, | Performed by: INTERNAL MEDICINE

## 2021-01-01 PROCEDURE — 93306 ECHO (CUPID ONLY): ICD-10-PCS | Mod: 26,,, | Performed by: INTERNAL MEDICINE

## 2021-01-01 PROCEDURE — 1101F PR PT FALLS ASSESS DOC 0-1 FALLS W/OUT INJ PAST YR: ICD-10-PCS | Mod: CPTII,S$GLB,, | Performed by: INTERNAL MEDICINE

## 2021-01-01 PROCEDURE — 3288F PR FALLS RISK ASSESSMENT DOCUMENTED: ICD-10-PCS | Mod: CPTII,S$GLB,, | Performed by: INTERNAL MEDICINE

## 2021-01-01 PROCEDURE — 3078F DIAST BP <80 MM HG: CPT | Mod: CPTII,S$GLB,, | Performed by: INTERNAL MEDICINE

## 2021-01-01 PROCEDURE — 99215 PR OFFICE/OUTPT VISIT, EST, LEVL V, 40-54 MIN: ICD-10-PCS | Mod: S$GLB,,, | Performed by: INTERNAL MEDICINE

## 2021-01-01 PROCEDURE — 1101F PT FALLS ASSESS-DOCD LE1/YR: CPT | Mod: CPTII,S$GLB,, | Performed by: INTERNAL MEDICINE

## 2021-01-01 PROCEDURE — 94618 PULMONARY STRESS TESTING: CPT | Mod: S$GLB,,, | Performed by: INTERNAL MEDICINE

## 2021-01-01 PROCEDURE — 3008F BODY MASS INDEX DOCD: CPT | Mod: CPTII,S$GLB,, | Performed by: INTERNAL MEDICINE

## 2021-01-01 PROCEDURE — 3078F PR MOST RECENT DIASTOLIC BLOOD PRESSURE < 80 MM HG: ICD-10-PCS | Mod: CPTII,S$GLB,, | Performed by: INTERNAL MEDICINE

## 2021-01-01 PROCEDURE — 1157F PR ADVANCE CARE PLAN OR EQUIV PRESENT IN MEDICAL RECORD: ICD-10-PCS | Mod: S$GLB,,, | Performed by: INTERNAL MEDICINE

## 2021-01-01 PROCEDURE — 3008F PR BODY MASS INDEX (BMI) DOCUMENTED: ICD-10-PCS | Mod: CPTII,S$GLB,, | Performed by: INTERNAL MEDICINE

## 2021-01-01 PROCEDURE — 94618 PULMONARY STRESS TESTING: ICD-10-PCS | Mod: S$GLB,,, | Performed by: INTERNAL MEDICINE

## 2021-01-01 PROCEDURE — 1126F PR PAIN SEVERITY QUANTIFIED, NO PAIN PRESENT: ICD-10-PCS | Mod: S$GLB,,, | Performed by: INTERNAL MEDICINE

## 2021-01-01 PROCEDURE — 99999 PR PBB SHADOW E&M-EST. PATIENT-LVL III: CPT | Mod: PBBFAC,,, | Performed by: INTERNAL MEDICINE

## 2021-01-01 PROCEDURE — 99214 OFFICE O/P EST MOD 30 MIN: CPT | Mod: S$GLB,,, | Performed by: INTERNAL MEDICINE

## 2021-01-01 PROCEDURE — 1159F MED LIST DOCD IN RCRD: CPT | Mod: S$GLB,,, | Performed by: INTERNAL MEDICINE

## 2021-01-01 PROCEDURE — 1157F ADVNC CARE PLAN IN RCRD: CPT | Mod: S$GLB,,, | Performed by: INTERNAL MEDICINE

## 2021-01-01 PROCEDURE — 1160F PR REVIEW ALL MEDS BY PRESCRIBER/CLIN PHARMACIST DOCUMENTED: ICD-10-PCS | Mod: CPTII,S$GLB,, | Performed by: INTERNAL MEDICINE

## 2021-01-01 PROCEDURE — 99999 PR PBB SHADOW E&M-EST. PATIENT-LVL V: ICD-10-PCS | Mod: PBBFAC,,, | Performed by: INTERNAL MEDICINE

## 2021-01-01 PROCEDURE — 99999 PR PBB SHADOW E&M-EST. PATIENT-LVL V: CPT | Mod: PBBFAC,,, | Performed by: INTERNAL MEDICINE

## 2021-01-01 PROCEDURE — 1157F PR ADVANCE CARE PLAN OR EQUIV PRESENT IN MEDICAL RECORD: ICD-10-PCS | Mod: CPTII,S$GLB,, | Performed by: INTERNAL MEDICINE

## 2021-01-01 PROCEDURE — 3074F PR MOST RECENT SYSTOLIC BLOOD PRESSURE < 130 MM HG: ICD-10-PCS | Mod: CPTII,S$GLB,, | Performed by: INTERNAL MEDICINE

## 2021-01-01 PROCEDURE — 93306 TTE W/DOPPLER COMPLETE: CPT

## 2021-01-01 PROCEDURE — 1157F ADVNC CARE PLAN IN RCRD: CPT | Mod: CPTII,S$GLB,, | Performed by: INTERNAL MEDICINE

## 2021-01-01 PROCEDURE — 1159F PR MEDICATION LIST DOCUMENTED IN MEDICAL RECORD: ICD-10-PCS | Mod: S$GLB,,, | Performed by: INTERNAL MEDICINE

## 2021-01-01 PROCEDURE — 99214 PR OFFICE/OUTPT VISIT, EST, LEVL IV, 30-39 MIN: ICD-10-PCS | Mod: S$GLB,,, | Performed by: INTERNAL MEDICINE

## 2021-01-01 PROCEDURE — 93306 TTE W/DOPPLER COMPLETE: CPT | Mod: 26,,, | Performed by: INTERNAL MEDICINE

## 2021-01-01 PROCEDURE — 4010F ACE/ARB THERAPY RXD/TAKEN: CPT | Mod: CPTII,S$GLB,, | Performed by: INTERNAL MEDICINE

## 2021-01-01 PROCEDURE — 4010F PR ACE/ARB THEARPY RXD/TAKEN: ICD-10-PCS | Mod: CPTII,S$GLB,, | Performed by: INTERNAL MEDICINE

## 2021-01-01 PROCEDURE — 99999 PR PBB SHADOW E&M-EST. PATIENT-LVL III: ICD-10-PCS | Mod: PBBFAC,,, | Performed by: INTERNAL MEDICINE

## 2021-01-01 PROCEDURE — 1159F PR MEDICATION LIST DOCUMENTED IN MEDICAL RECORD: ICD-10-PCS | Mod: CPTII,S$GLB,, | Performed by: INTERNAL MEDICINE

## 2021-01-01 PROCEDURE — 1159F MED LIST DOCD IN RCRD: CPT | Mod: CPTII,S$GLB,, | Performed by: INTERNAL MEDICINE

## 2021-01-01 PROCEDURE — 1160F RVW MEDS BY RX/DR IN RCRD: CPT | Mod: CPTII,S$GLB,, | Performed by: INTERNAL MEDICINE

## 2021-01-01 PROCEDURE — 0013A COVID-19, MRNA, LNP-S, PF, 100 MCG/0.5 ML DOSE VACCINE: CPT | Mod: CV19,PBBFAC | Performed by: INTERNAL MEDICINE

## 2021-01-01 PROCEDURE — 1126F AMNT PAIN NOTED NONE PRSNT: CPT | Mod: S$GLB,,, | Performed by: INTERNAL MEDICINE

## 2021-01-01 PROCEDURE — 1126F PR PAIN SEVERITY QUANTIFIED, NO PAIN PRESENT: ICD-10-PCS | Mod: CPTII,S$GLB,, | Performed by: INTERNAL MEDICINE

## 2021-01-01 PROCEDURE — 99215 OFFICE O/P EST HI 40 MIN: CPT | Mod: S$GLB,,, | Performed by: INTERNAL MEDICINE

## 2021-01-01 RX ORDER — MONTELUKAST SODIUM 10 MG/1
TABLET ORAL
COMMUNITY
Start: 2021-01-01

## 2021-01-01 RX ORDER — PANTOPRAZOLE SODIUM 40 MG/1
TABLET, DELAYED RELEASE ORAL
COMMUNITY

## 2021-01-01 RX ORDER — OXYMETAZOLINE HCL 0.05 %
2 SPRAY, NON-AEROSOL (ML) NASAL 2 TIMES DAILY
COMMUNITY

## 2021-01-06 ENCOUNTER — TELEPHONE (OUTPATIENT)
Dept: TRANSPLANT | Facility: CLINIC | Age: 71
End: 2021-01-06

## 2021-01-07 ENCOUNTER — TELEPHONE (OUTPATIENT)
Dept: TRANSPLANT | Facility: CLINIC | Age: 71
End: 2021-01-07

## 2021-01-15 ENCOUNTER — TELEPHONE (OUTPATIENT)
Dept: TRANSPLANT | Facility: CLINIC | Age: 71
End: 2021-01-15

## 2021-02-12 ENCOUNTER — TELEPHONE (OUTPATIENT)
Dept: TRANSPLANT | Facility: CLINIC | Age: 71
End: 2021-02-12

## 2021-02-19 DIAGNOSIS — I27.20 PULMONARY HYPERTENSION: Primary | ICD-10-CM

## 2021-02-22 ENCOUNTER — TELEPHONE (OUTPATIENT)
Dept: TRANSPLANT | Facility: CLINIC | Age: 71
End: 2021-02-22

## 2021-02-24 RX ORDER — MACITENTAN 10 MG/1
10 TABLET, FILM COATED ORAL DAILY
Qty: 30 TABLET | Refills: 11 | Status: SHIPPED | OUTPATIENT
Start: 2021-02-24

## 2022-01-01 ENCOUNTER — TELEPHONE (OUTPATIENT)
Dept: TRANSPLANT | Facility: CLINIC | Age: 72
End: 2022-01-01
Payer: MEDICARE

## 2022-01-01 ENCOUNTER — OFFICE VISIT (OUTPATIENT)
Dept: TRANSPLANT | Facility: CLINIC | Age: 72
End: 2022-01-01
Payer: MEDICARE

## 2022-01-01 ENCOUNTER — OFFICE VISIT (OUTPATIENT)
Dept: HOME HEALTH SERVICES | Facility: CLINIC | Age: 72
End: 2022-01-01
Payer: MEDICARE

## 2022-01-01 ENCOUNTER — TELEPHONE (OUTPATIENT)
Dept: PALLIATIVE MEDICINE | Facility: CLINIC | Age: 72
End: 2022-01-01
Payer: MEDICARE

## 2022-01-01 ENCOUNTER — OFFICE VISIT (OUTPATIENT)
Dept: INTERVENTIONAL RADIOLOGY/VASCULAR | Facility: CLINIC | Age: 72
End: 2022-01-01
Payer: MEDICARE

## 2022-01-01 ENCOUNTER — LAB VISIT (OUTPATIENT)
Dept: LAB | Facility: HOSPITAL | Age: 72
End: 2022-01-01
Payer: MEDICARE

## 2022-01-01 ENCOUNTER — HOSPITAL ENCOUNTER (EMERGENCY)
Facility: HOSPITAL | Age: 72
Discharge: HOME | End: 2022-01-08
Attending: EMERGENCY MEDICINE
Payer: MEDICARE

## 2022-01-01 ENCOUNTER — HOSPITAL ENCOUNTER (INPATIENT)
Facility: HOSPITAL | Age: 72
LOS: 2 days | Discharge: HOME | DRG: 181 | End: 2022-01-19
Attending: EMERGENCY MEDICINE | Admitting: INTERNAL MEDICINE
Payer: MEDICARE

## 2022-01-01 ENCOUNTER — LAB VISIT (OUTPATIENT)
Dept: LAB | Facility: HOSPITAL | Age: 72
End: 2022-01-01
Attending: INTERNAL MEDICINE
Payer: MEDICARE

## 2022-01-01 ENCOUNTER — PATIENT OUTREACH (OUTPATIENT)
Dept: ADMINISTRATIVE | Facility: CLINIC | Age: 72
End: 2022-01-01
Payer: MEDICARE

## 2022-01-01 VITALS
SYSTOLIC BLOOD PRESSURE: 104 MMHG | OXYGEN SATURATION: 94 % | WEIGHT: 173.31 LBS | TEMPERATURE: 99 F | BODY MASS INDEX: 27.85 KG/M2 | HEART RATE: 97 BPM | HEIGHT: 66 IN | DIASTOLIC BLOOD PRESSURE: 53 MMHG | RESPIRATION RATE: 16 BRPM

## 2022-01-01 VITALS
HEIGHT: 66 IN | DIASTOLIC BLOOD PRESSURE: 63 MMHG | WEIGHT: 173 LBS | SYSTOLIC BLOOD PRESSURE: 132 MMHG | OXYGEN SATURATION: 90 % | BODY MASS INDEX: 26.91 KG/M2 | HEART RATE: 95 BPM | SYSTOLIC BLOOD PRESSURE: 101 MMHG | HEIGHT: 66 IN | WEIGHT: 167.44 LBS | HEART RATE: 96 BPM | DIASTOLIC BLOOD PRESSURE: 63 MMHG | BODY MASS INDEX: 27.8 KG/M2

## 2022-01-01 VITALS
DIASTOLIC BLOOD PRESSURE: 81 MMHG | SYSTOLIC BLOOD PRESSURE: 140 MMHG | RESPIRATION RATE: 18 BRPM | BODY MASS INDEX: 27.8 KG/M2 | HEART RATE: 77 BPM | WEIGHT: 173 LBS | OXYGEN SATURATION: 96 % | HEIGHT: 66 IN | TEMPERATURE: 98 F

## 2022-01-01 VITALS
HEART RATE: 90 BPM | SYSTOLIC BLOOD PRESSURE: 122 MMHG | OXYGEN SATURATION: 97 % | TEMPERATURE: 99 F | RESPIRATION RATE: 20 BRPM | DIASTOLIC BLOOD PRESSURE: 60 MMHG

## 2022-01-01 DIAGNOSIS — J43.8 OTHER EMPHYSEMA: Primary | ICD-10-CM

## 2022-01-01 DIAGNOSIS — Z79.899 POLYPHARMACY: ICD-10-CM

## 2022-01-01 DIAGNOSIS — I27.20 PULMONARY HYPERTENSION: Primary | ICD-10-CM

## 2022-01-01 DIAGNOSIS — Z01.812 PRE-PROCEDURE LAB EXAM: ICD-10-CM

## 2022-01-01 DIAGNOSIS — R06.02 SHORTNESS OF BREATH: ICD-10-CM

## 2022-01-01 DIAGNOSIS — R16.0 LIVER MASS: Primary | ICD-10-CM

## 2022-01-01 DIAGNOSIS — I27.20 PULMONARY HYPERTENSION: ICD-10-CM

## 2022-01-01 DIAGNOSIS — R10.31 RIGHT LOWER QUADRANT ABDOMINAL PAIN: ICD-10-CM

## 2022-01-01 DIAGNOSIS — R04.2 HEMOPTYSIS: ICD-10-CM

## 2022-01-01 DIAGNOSIS — I50.9 HEART FAILURE: ICD-10-CM

## 2022-01-01 DIAGNOSIS — R04.2 HEMOPTYSIS: Primary | ICD-10-CM

## 2022-01-01 DIAGNOSIS — I27.9 CHRONIC PULMONARY HEART DISEASE: Primary | ICD-10-CM

## 2022-01-01 DIAGNOSIS — R06.82 TACHYPNEA: ICD-10-CM

## 2022-01-01 DIAGNOSIS — R91.8 PULMONARY MASS: Primary | ICD-10-CM

## 2022-01-01 DIAGNOSIS — M54.50 ACUTE RIGHT-SIDED LOW BACK PAIN, UNSPECIFIED WHETHER SCIATICA PRESENT: ICD-10-CM

## 2022-01-01 DIAGNOSIS — Z79.899 POLYPHARMACY: Primary | ICD-10-CM

## 2022-01-01 LAB
ALBUMIN SERPL BCP-MCNC: 2.6 G/DL (ref 3.5–5.2)
ALBUMIN SERPL BCP-MCNC: 3 G/DL (ref 3.5–5.2)
ALBUMIN SERPL BCP-MCNC: 3.1 G/DL (ref 3.5–5.2)
ALP SERPL-CCNC: 116 U/L (ref 55–135)
ALP SERPL-CCNC: 132 U/L (ref 55–135)
ALP SERPL-CCNC: 132 U/L (ref 55–135)
ALP SERPL-CCNC: 92 U/L (ref 55–135)
ALP SERPL-CCNC: 96 U/L (ref 55–135)
ALT SERPL W/O P-5'-P-CCNC: 12 U/L (ref 10–44)
ALT SERPL W/O P-5'-P-CCNC: 13 U/L (ref 10–44)
ALT SERPL W/O P-5'-P-CCNC: 8 U/L (ref 10–44)
ALT SERPL W/O P-5'-P-CCNC: 9 U/L (ref 10–44)
ALT SERPL W/O P-5'-P-CCNC: 9 U/L (ref 10–44)
ANION GAP SERPL CALC-SCNC: 10 MMOL/L (ref 8–16)
ANION GAP SERPL CALC-SCNC: 10 MMOL/L (ref 8–16)
ANION GAP SERPL CALC-SCNC: 11 MMOL/L (ref 8–16)
ANION GAP SERPL CALC-SCNC: 12 MMOL/L (ref 8–16)
ANION GAP SERPL CALC-SCNC: 9 MMOL/L (ref 8–16)
ASCENDING AORTA: 3.14 CM
AST SERPL-CCNC: 17 U/L (ref 10–40)
AST SERPL-CCNC: 17 U/L (ref 10–40)
AST SERPL-CCNC: 18 U/L (ref 10–40)
AST SERPL-CCNC: 18 U/L (ref 10–40)
AST SERPL-CCNC: 20 U/L (ref 10–40)
AV INDEX (PROSTH): 0.8
AV MEAN GRADIENT: 9 MMHG
AV PEAK GRADIENT: 18 MMHG
AV VALVE AREA: 2.4 CM2
AV VELOCITY RATIO: 0.77
BACTERIA #/AREA URNS AUTO: ABNORMAL /HPF
BACTERIA BLD CULT: NORMAL
BACTERIA BLD CULT: NORMAL
BASOPHILS # BLD AUTO: 0.02 K/UL (ref 0–0.2)
BASOPHILS # BLD AUTO: 0.03 K/UL (ref 0–0.2)
BASOPHILS # BLD AUTO: 0.04 K/UL (ref 0–0.2)
BASOPHILS # BLD AUTO: 0.04 K/UL (ref 0–0.2)
BASOPHILS # BLD AUTO: 0.05 K/UL (ref 0–0.2)
BASOPHILS NFR BLD: 0.3 % (ref 0–1.9)
BASOPHILS NFR BLD: 0.5 % (ref 0–1.9)
BASOPHILS NFR BLD: 0.6 % (ref 0–1.9)
BILIRUB SERPL-MCNC: 0.3 MG/DL (ref 0.1–1)
BILIRUB SERPL-MCNC: 0.4 MG/DL (ref 0.1–1)
BILIRUB UR QL STRIP: NEGATIVE
BILIRUB UR QL STRIP: NEGATIVE
BNP SERPL-MCNC: 16 PG/ML (ref 0–99)
BNP SERPL-MCNC: 37 PG/ML (ref 0–99)
BNP SERPL-MCNC: 37 PG/ML (ref 0–99)
BNP SERPL-MCNC: 40 PG/ML (ref 0–99)
BSA FOR ECHO PROCEDURE: 2 M2
BUN SERPL-MCNC: 19 MG/DL (ref 8–23)
BUN SERPL-MCNC: 24 MG/DL (ref 8–23)
BUN SERPL-MCNC: 26 MG/DL (ref 8–23)
BUN SERPL-MCNC: 26 MG/DL (ref 8–23)
BUN SERPL-MCNC: 27 MG/DL (ref 8–23)
BUN SERPL-MCNC: 27 MG/DL (ref 8–23)
BUN SERPL-MCNC: 38 MG/DL (ref 8–23)
BUN SERPL-MCNC: 48 MG/DL (ref 6–30)
BUN SERPL-MCNC: 51 MG/DL (ref 8–23)
BUN SERPL-MCNC: 68 MG/DL (ref 8–23)
CALCIUM SERPL-MCNC: 8.8 MG/DL (ref 8.7–10.5)
CALCIUM SERPL-MCNC: 9.2 MG/DL (ref 8.7–10.5)
CALCIUM SERPL-MCNC: 9.2 MG/DL (ref 8.7–10.5)
CALCIUM SERPL-MCNC: 9.3 MG/DL (ref 8.7–10.5)
CALCIUM SERPL-MCNC: 9.3 MG/DL (ref 8.7–10.5)
CALCIUM SERPL-MCNC: 9.6 MG/DL (ref 8.7–10.5)
CALCIUM SERPL-MCNC: 9.7 MG/DL (ref 8.7–10.5)
CALCIUM SERPL-MCNC: 9.7 MG/DL (ref 8.7–10.5)
CALCIUM SERPL-MCNC: 9.8 MG/DL (ref 8.7–10.5)
CHLORIDE SERPL-SCNC: 101 MMOL/L (ref 95–110)
CHLORIDE SERPL-SCNC: 102 MMOL/L (ref 95–110)
CHLORIDE SERPL-SCNC: 103 MMOL/L (ref 95–110)
CHLORIDE SERPL-SCNC: 104 MMOL/L (ref 95–110)
CHLORIDE SERPL-SCNC: 96 MMOL/L (ref 95–110)
CLARITY UR REFRACT.AUTO: ABNORMAL
CLARITY UR REFRACT.AUTO: CLEAR
CO2 SERPL-SCNC: 22 MMOL/L (ref 23–29)
CO2 SERPL-SCNC: 23 MMOL/L (ref 23–29)
CO2 SERPL-SCNC: 24 MMOL/L (ref 23–29)
CO2 SERPL-SCNC: 26 MMOL/L (ref 23–29)
CO2 SERPL-SCNC: 27 MMOL/L (ref 23–29)
CO2 SERPL-SCNC: 27 MMOL/L (ref 23–29)
CO2 SERPL-SCNC: 29 MMOL/L (ref 23–29)
COLOR UR AUTO: NORMAL
COLOR UR AUTO: YELLOW
CREAT SERPL-MCNC: 1.2 MG/DL (ref 0.5–1.4)
CREAT SERPL-MCNC: 1.3 MG/DL (ref 0.5–1.4)
CREAT SERPL-MCNC: 1.4 MG/DL (ref 0.5–1.4)
CREAT SERPL-MCNC: 1.5 MG/DL (ref 0.5–1.4)
CREAT SERPL-MCNC: 1.8 MG/DL (ref 0.5–1.4)
CREAT SERPL-MCNC: 2.2 MG/DL (ref 0.5–1.4)
CREAT SERPL-MCNC: 2.2 MG/DL (ref 0.5–1.4)
CREAT SERPL-MCNC: 2.7 MG/DL (ref 0.5–1.4)
CRP SERPL-MCNC: 143.6 MG/L (ref 0–8.2)
CTP QC/QA: YES
CV ECHO LV RWT: 0.36 CM
DIFFERENTIAL METHOD: ABNORMAL
DOP CALC AO PEAK VEL: 2.15 M/S
DOP CALC AO VTI: 36.26 CM
DOP CALC LVOT AREA: 3 CM2
DOP CALC LVOT DIAMETER: 1.96 CM
DOP CALC LVOT PEAK VEL: 1.66 M/S
DOP CALC LVOT STROKE VOLUME: 87.03 CM3
DOP CALCLVOT PEAK VEL VTI: 28.86 CM
E WAVE DECELERATION TIME: 134.83 MSEC
E/A RATIO: 0.79
E/E' RATIO: 15.69 M/S
ECHO LV POSTERIOR WALL: 0.91 CM (ref 0.6–1.1)
EJECTION FRACTION: 55 %
EOSINOPHIL # BLD AUTO: 0 K/UL (ref 0–0.5)
EOSINOPHIL # BLD AUTO: 0 K/UL (ref 0–0.5)
EOSINOPHIL # BLD AUTO: 0.1 K/UL (ref 0–0.5)
EOSINOPHIL NFR BLD: 0.4 % (ref 0–8)
EOSINOPHIL NFR BLD: 0.5 % (ref 0–8)
EOSINOPHIL NFR BLD: 0.8 % (ref 0–8)
EOSINOPHIL NFR BLD: 0.9 % (ref 0–8)
EOSINOPHIL NFR BLD: 1.5 % (ref 0–8)
ERYTHROCYTE [DISTWIDTH] IN BLOOD BY AUTOMATED COUNT: 15.1 % (ref 11.5–14.5)
ERYTHROCYTE [DISTWIDTH] IN BLOOD BY AUTOMATED COUNT: 15.3 % (ref 11.5–14.5)
ERYTHROCYTE [DISTWIDTH] IN BLOOD BY AUTOMATED COUNT: 15.4 % (ref 11.5–14.5)
ERYTHROCYTE [DISTWIDTH] IN BLOOD BY AUTOMATED COUNT: 15.4 % (ref 11.5–14.5)
ERYTHROCYTE [DISTWIDTH] IN BLOOD BY AUTOMATED COUNT: 16.3 % (ref 11.5–14.5)
ERYTHROCYTE [SEDIMENTATION RATE] IN BLOOD BY WESTERGREN METHOD: >120 MM/HR (ref 0–36)
EST. GFR  (AFRICAN AMERICAN): 19.8 ML/MIN/1.73 M^2
EST. GFR  (AFRICAN AMERICAN): 25.4 ML/MIN/1.73 M^2
EST. GFR  (AFRICAN AMERICAN): 32.4 ML/MIN/1.73 M^2
EST. GFR  (AFRICAN AMERICAN): 40.4 ML/MIN/1.73 M^2
EST. GFR  (AFRICAN AMERICAN): 43.9 ML/MIN/1.73 M^2
EST. GFR  (AFRICAN AMERICAN): 48 ML/MIN/1.73 M^2
EST. GFR  (AFRICAN AMERICAN): 52.9 ML/MIN/1.73 M^2
EST. GFR  (NON AFRICAN AMERICAN): 17.2 ML/MIN/1.73 M^2
EST. GFR  (NON AFRICAN AMERICAN): 22.1 ML/MIN/1.73 M^2
EST. GFR  (NON AFRICAN AMERICAN): 28.1 ML/MIN/1.73 M^2
EST. GFR  (NON AFRICAN AMERICAN): 35 ML/MIN/1.73 M^2
EST. GFR  (NON AFRICAN AMERICAN): 38.1 ML/MIN/1.73 M^2
EST. GFR  (NON AFRICAN AMERICAN): 41.7 ML/MIN/1.73 M^2
EST. GFR  (NON AFRICAN AMERICAN): 45.9 ML/MIN/1.73 M^2
FRACTIONAL SHORTENING: 32 % (ref 28–44)
GLUCOSE SERPL-MCNC: 105 MG/DL (ref 70–110)
GLUCOSE SERPL-MCNC: 114 MG/DL (ref 70–110)
GLUCOSE SERPL-MCNC: 119 MG/DL (ref 70–110)
GLUCOSE SERPL-MCNC: 120 MG/DL (ref 70–110)
GLUCOSE SERPL-MCNC: 121 MG/DL (ref 70–110)
GLUCOSE SERPL-MCNC: 121 MG/DL (ref 70–110)
GLUCOSE SERPL-MCNC: 87 MG/DL (ref 70–110)
GLUCOSE SERPL-MCNC: 89 MG/DL (ref 70–110)
GLUCOSE SERPL-MCNC: 94 MG/DL (ref 70–110)
GLUCOSE SERPL-MCNC: 95 MG/DL (ref 70–110)
GLUCOSE UR QL STRIP: NEGATIVE
GLUCOSE UR QL STRIP: NEGATIVE
HCT VFR BLD AUTO: 29.1 % (ref 37–48.5)
HCT VFR BLD AUTO: 31.1 % (ref 37–48.5)
HCT VFR BLD AUTO: 32.1 % (ref 37–48.5)
HCT VFR BLD AUTO: 32.5 % (ref 37–48.5)
HCT VFR BLD AUTO: 32.5 % (ref 37–48.5)
HCT VFR BLD CALC: 31 %PCV (ref 36–54)
HGB BLD-MCNC: 8.9 G/DL (ref 12–16)
HGB BLD-MCNC: 9.5 G/DL (ref 12–16)
HGB BLD-MCNC: 9.5 G/DL (ref 12–16)
HGB BLD-MCNC: 9.7 G/DL (ref 12–16)
HGB BLD-MCNC: 9.9 G/DL (ref 12–16)
HGB UR QL STRIP: NEGATIVE
HGB UR QL STRIP: NEGATIVE
HYALINE CASTS UR QL AUTO: 1 /LPF
HYALINE CASTS UR QL AUTO: 2 /LPF
IMM GRANULOCYTES # BLD AUTO: 0.04 K/UL (ref 0–0.04)
IMM GRANULOCYTES # BLD AUTO: 0.07 K/UL (ref 0–0.04)
IMM GRANULOCYTES # BLD AUTO: 0.08 K/UL (ref 0–0.04)
IMM GRANULOCYTES # BLD AUTO: 0.1 K/UL (ref 0–0.04)
IMM GRANULOCYTES # BLD AUTO: 0.32 K/UL (ref 0–0.04)
IMM GRANULOCYTES NFR BLD AUTO: 0.6 % (ref 0–0.5)
IMM GRANULOCYTES NFR BLD AUTO: 1.2 % (ref 0–0.5)
IMM GRANULOCYTES NFR BLD AUTO: 1.2 % (ref 0–0.5)
IMM GRANULOCYTES NFR BLD AUTO: 1.5 % (ref 0–0.5)
IMM GRANULOCYTES NFR BLD AUTO: 4 % (ref 0–0.5)
INR PPP: 1 (ref 0.8–1.2)
INTERVENTRICULAR SEPTUM: 0.91 CM (ref 0.6–1.1)
IVRT: 71.36 MSEC
KETONES UR QL STRIP: NEGATIVE
KETONES UR QL STRIP: NEGATIVE
LA MAJOR: 5.99 CM
LA MINOR: 6.1 CM
LA WIDTH: 3.43 CM
LEFT ATRIUM SIZE: 4.62 CM
LEFT ATRIUM VOLUME INDEX MOD: 37.7 ML/M2
LEFT ATRIUM VOLUME INDEX: 41.8 ML/M2
LEFT ATRIUM VOLUME MOD: 73.58 CM3
LEFT ATRIUM VOLUME: 81.42 CM3
LEFT INTERNAL DIMENSION IN SYSTOLE: 3.41 CM (ref 2.1–4)
LEFT VENTRICLE DIASTOLIC VOLUME INDEX: 62.03 ML/M2
LEFT VENTRICLE DIASTOLIC VOLUME: 120.96 ML
LEFT VENTRICLE MASS INDEX: 84 G/M2
LEFT VENTRICLE SYSTOLIC VOLUME INDEX: 24.5 ML/M2
LEFT VENTRICLE SYSTOLIC VOLUME: 47.69 ML
LEFT VENTRICULAR INTERNAL DIMENSION IN DIASTOLE: 5.05 CM (ref 3.5–6)
LEFT VENTRICULAR MASS: 163.22 G
LEUKOCYTE ESTERASE UR QL STRIP: NEGATIVE
LEUKOCYTE ESTERASE UR QL STRIP: NEGATIVE
LV LATERAL E/E' RATIO: 12.75 M/S
LV SEPTAL E/E' RATIO: 20.4 M/S
LYMPHOCYTES # BLD AUTO: 0.7 K/UL (ref 1–4.8)
LYMPHOCYTES # BLD AUTO: 0.8 K/UL (ref 1–4.8)
LYMPHOCYTES # BLD AUTO: 0.9 K/UL (ref 1–4.8)
LYMPHOCYTES NFR BLD: 10.3 % (ref 18–48)
LYMPHOCYTES NFR BLD: 10.6 % (ref 18–48)
LYMPHOCYTES NFR BLD: 12.4 % (ref 18–48)
LYMPHOCYTES NFR BLD: 13.1 % (ref 18–48)
LYMPHOCYTES NFR BLD: 9.5 % (ref 18–48)
MAGNESIUM SERPL-MCNC: 1.7 MG/DL (ref 1.6–2.6)
MAGNESIUM SERPL-MCNC: 1.8 MG/DL (ref 1.6–2.6)
MAGNESIUM SERPL-MCNC: 1.8 MG/DL (ref 1.6–2.6)
MAGNESIUM SERPL-MCNC: 1.9 MG/DL (ref 1.6–2.6)
MAGNESIUM SERPL-MCNC: 2 MG/DL (ref 1.6–2.6)
MCH RBC QN AUTO: 26 PG (ref 27–31)
MCH RBC QN AUTO: 26 PG (ref 27–31)
MCH RBC QN AUTO: 26.1 PG (ref 27–31)
MCH RBC QN AUTO: 26.4 PG (ref 27–31)
MCH RBC QN AUTO: 26.7 PG (ref 27–31)
MCHC RBC AUTO-ENTMCNC: 29.2 G/DL (ref 32–36)
MCHC RBC AUTO-ENTMCNC: 29.8 G/DL (ref 32–36)
MCHC RBC AUTO-ENTMCNC: 30.5 G/DL (ref 32–36)
MCHC RBC AUTO-ENTMCNC: 30.6 G/DL (ref 32–36)
MCHC RBC AUTO-ENTMCNC: 30.8 G/DL (ref 32–36)
MCV RBC AUTO: 85 FL (ref 82–98)
MCV RBC AUTO: 86 FL (ref 82–98)
MCV RBC AUTO: 87 FL (ref 82–98)
MCV RBC AUTO: 87 FL (ref 82–98)
MCV RBC AUTO: 89 FL (ref 82–98)
MICROSCOPIC COMMENT: ABNORMAL
MICROSCOPIC COMMENT: NORMAL
MONOCYTES # BLD AUTO: 0.5 K/UL (ref 0.3–1)
MONOCYTES # BLD AUTO: 0.5 K/UL (ref 0.3–1)
MONOCYTES # BLD AUTO: 0.6 K/UL (ref 0.3–1)
MONOCYTES # BLD AUTO: 0.6 K/UL (ref 0.3–1)
MONOCYTES # BLD AUTO: 0.8 K/UL (ref 0.3–1)
MONOCYTES NFR BLD: 10.1 % (ref 4–15)
MONOCYTES NFR BLD: 7.5 % (ref 4–15)
MONOCYTES NFR BLD: 8.4 % (ref 4–15)
MONOCYTES NFR BLD: 9.5 % (ref 4–15)
MONOCYTES NFR BLD: 9.6 % (ref 4–15)
MV A" WAVE DURATION": 9.13 MSEC
MV PEAK A VEL: 1.29 M/S
MV PEAK E VEL: 1.02 M/S
MV STENOSIS PRESSURE HALF TIME: 39.1 MS
MV VALVE AREA P 1/2 METHOD: 5.63 CM2
NEUTROPHILS # BLD AUTO: 4.4 K/UL (ref 1.8–7.7)
NEUTROPHILS # BLD AUTO: 4.9 K/UL (ref 1.8–7.7)
NEUTROPHILS # BLD AUTO: 5 K/UL (ref 1.8–7.7)
NEUTROPHILS # BLD AUTO: 5.1 K/UL (ref 1.8–7.7)
NEUTROPHILS # BLD AUTO: 6.1 K/UL (ref 1.8–7.7)
NEUTROPHILS NFR BLD: 75.5 % (ref 38–73)
NEUTROPHILS NFR BLD: 75.8 % (ref 38–73)
NEUTROPHILS NFR BLD: 75.9 % (ref 38–73)
NEUTROPHILS NFR BLD: 78.2 % (ref 38–73)
NEUTROPHILS NFR BLD: 78.4 % (ref 38–73)
NITRITE UR QL STRIP: NEGATIVE
NITRITE UR QL STRIP: NEGATIVE
NRBC BLD-RTO: 0 /100 WBC
PH UR STRIP: 5 [PH] (ref 5–8)
PH UR STRIP: 5 [PH] (ref 5–8)
PHOSPHATE SERPL-MCNC: 3.2 MG/DL (ref 2.7–4.5)
PISA TR MAX VEL: 4.04 M/S
PLATELET # BLD AUTO: 275 K/UL (ref 150–450)
PLATELET # BLD AUTO: 297 K/UL (ref 150–450)
PLATELET # BLD AUTO: 298 K/UL (ref 150–450)
PLATELET # BLD AUTO: 303 K/UL (ref 150–450)
PLATELET # BLD AUTO: 365 K/UL (ref 150–450)
PMV BLD AUTO: 8.8 FL (ref 9.2–12.9)
PMV BLD AUTO: 9 FL (ref 9.2–12.9)
PMV BLD AUTO: 9.2 FL (ref 9.2–12.9)
PMV BLD AUTO: 9.2 FL (ref 9.2–12.9)
PMV BLD AUTO: 9.6 FL (ref 9.2–12.9)
POC IONIZED CALCIUM: 1.14 MMOL/L (ref 1.06–1.42)
POC MOLECULAR INFLUENZA A AGN: NEGATIVE
POC MOLECULAR INFLUENZA B AGN: NEGATIVE
POC TCO2 (MEASURED): 24 MMOL/L (ref 23–29)
POTASSIUM BLD-SCNC: 4.7 MMOL/L (ref 3.5–5.1)
POTASSIUM SERPL-SCNC: 3.8 MMOL/L (ref 3.5–5.1)
POTASSIUM SERPL-SCNC: 3.9 MMOL/L (ref 3.5–5.1)
POTASSIUM SERPL-SCNC: 4.2 MMOL/L (ref 3.5–5.1)
POTASSIUM SERPL-SCNC: 4.3 MMOL/L (ref 3.5–5.1)
POTASSIUM SERPL-SCNC: 4.5 MMOL/L (ref 3.5–5.1)
POTASSIUM SERPL-SCNC: 4.8 MMOL/L (ref 3.5–5.1)
POTASSIUM SERPL-SCNC: 5 MMOL/L (ref 3.5–5.1)
PROT SERPL-MCNC: 6.7 G/DL (ref 6–8.4)
PROT SERPL-MCNC: 6.7 G/DL (ref 6–8.4)
PROT SERPL-MCNC: 6.8 G/DL (ref 6–8.4)
PROT SERPL-MCNC: 7 G/DL (ref 6–8.4)
PROT SERPL-MCNC: 7 G/DL (ref 6–8.4)
PROT UR QL STRIP: NEGATIVE
PROT UR QL STRIP: NEGATIVE
PROTHROMBIN TIME: 10.9 SEC (ref 9–12.5)
PULM VEIN S/D RATIO: 1.36
PV PEAK D VEL: 0.53 M/S
PV PEAK S VEL: 0.72 M/S
RA MAJOR: 5.86 CM
RA PRESSURE: 3 MMHG
RA WIDTH: 4.37 CM
RBC # BLD AUTO: 3.42 M/UL (ref 4–5.4)
RBC # BLD AUTO: 3.6 M/UL (ref 4–5.4)
RBC # BLD AUTO: 3.66 M/UL (ref 4–5.4)
RBC # BLD AUTO: 3.71 M/UL (ref 4–5.4)
RBC # BLD AUTO: 3.72 M/UL (ref 4–5.4)
RBC #/AREA URNS AUTO: 2 /HPF (ref 0–4)
RBC #/AREA URNS AUTO: 3 /HPF (ref 0–4)
RIGHT VENTRICULAR END-DIASTOLIC DIMENSION: 4.11 CM
RV TISSUE DOPPLER FREE WALL SYSTOLIC VELOCITY 1 (APICAL 4 CHAMBER VIEW): 16.73 CM/S
SAMPLE: ABNORMAL
SARS-COV-2 RDRP RESP QL NAA+PROBE: NEGATIVE
SARS-COV-2 RDRP RESP QL NAA+PROBE: NEGATIVE
SINUS: 2.79 CM
SODIUM BLD-SCNC: 137 MMOL/L (ref 136–145)
SODIUM SERPL-SCNC: 132 MMOL/L (ref 136–145)
SODIUM SERPL-SCNC: 134 MMOL/L (ref 136–145)
SODIUM SERPL-SCNC: 136 MMOL/L (ref 136–145)
SODIUM SERPL-SCNC: 137 MMOL/L (ref 136–145)
SODIUM SERPL-SCNC: 138 MMOL/L (ref 136–145)
SODIUM SERPL-SCNC: 139 MMOL/L (ref 136–145)
SODIUM SERPL-SCNC: 139 MMOL/L (ref 136–145)
SODIUM SERPL-SCNC: 140 MMOL/L (ref 136–145)
SODIUM SERPL-SCNC: 140 MMOL/L (ref 136–145)
SP GR UR STRIP: 1.01 (ref 1–1.03)
SP GR UR STRIP: 1.01 (ref 1–1.03)
SQUAMOUS #/AREA URNS AUTO: 12 /HPF
SQUAMOUS #/AREA URNS AUTO: 2 /HPF
STJ: 2.4 CM
TDI LATERAL: 0.08 M/S
TDI SEPTAL: 0.05 M/S
TDI: 0.07 M/S
TR MAX PG: 65 MMHG
TRICUSPID ANNULAR PLANE SYSTOLIC EXCURSION: 2.4 CM
TROPONIN I SERPL DL<=0.01 NG/ML-MCNC: 0.01 NG/ML (ref 0–0.03)
TROPONIN I SERPL DL<=0.01 NG/ML-MCNC: 0.01 NG/ML (ref 0–0.03)
TV REST PULMONARY ARTERY PRESSURE: 68 MMHG
URN SPEC COLLECT METH UR: ABNORMAL
URN SPEC COLLECT METH UR: NORMAL
WBC # BLD AUTO: 5.88 K/UL (ref 3.9–12.7)
WBC # BLD AUTO: 6.32 K/UL (ref 3.9–12.7)
WBC # BLD AUTO: 6.43 K/UL (ref 3.9–12.7)
WBC # BLD AUTO: 6.66 K/UL (ref 3.9–12.7)
WBC # BLD AUTO: 8.09 K/UL (ref 3.9–12.7)
WBC #/AREA URNS AUTO: 0 /HPF (ref 0–5)
WBC #/AREA URNS AUTO: 3 /HPF (ref 0–5)

## 2022-01-01 PROCEDURE — 3078F PR MOST RECENT DIASTOLIC BLOOD PRESSURE < 80 MM HG: ICD-10-PCS | Mod: CPTII,S$GLB,,

## 2022-01-01 PROCEDURE — 4010F ACE/ARB THERAPY RXD/TAKEN: CPT | Mod: CPTII,S$GLB,, | Performed by: NURSE PRACTITIONER

## 2022-01-01 PROCEDURE — 93010 ELECTROCARDIOGRAM REPORT: CPT | Mod: ,,, | Performed by: INTERNAL MEDICINE

## 2022-01-01 PROCEDURE — 94761 N-INVAS EAR/PLS OXIMETRY MLT: CPT

## 2022-01-01 PROCEDURE — 3288F PR FALLS RISK ASSESSMENT DOCUMENTED: ICD-10-PCS | Mod: CPTII,S$GLB,, | Performed by: NURSE PRACTITIONER

## 2022-01-01 PROCEDURE — 25500020 PHARM REV CODE 255: Performed by: INTERNAL MEDICINE

## 2022-01-01 PROCEDURE — 87502 INFLUENZA DNA AMP PROBE: CPT

## 2022-01-01 PROCEDURE — 1126F AMNT PAIN NOTED NONE PRSNT: CPT | Mod: CPTII,S$GLB,, | Performed by: NURSE PRACTITIONER

## 2022-01-01 PROCEDURE — 80048 BASIC METABOLIC PNL TOTAL CA: CPT

## 2022-01-01 PROCEDURE — 99495 TRANSJ CARE MGMT MOD F2F 14D: CPT | Mod: S$GLB,,, | Performed by: NURSE PRACTITIONER

## 2022-01-01 PROCEDURE — 3074F PR MOST RECENT SYSTOLIC BLOOD PRESSURE < 130 MM HG: ICD-10-PCS | Mod: CPTII,S$GLB,, | Performed by: FAMILY MEDICINE

## 2022-01-01 PROCEDURE — 99285 PR EMERGENCY DEPT VISIT,LEVEL V: ICD-10-PCS | Mod: ,,, | Performed by: EMERGENCY MEDICINE

## 2022-01-01 PROCEDURE — 83735 ASSAY OF MAGNESIUM: CPT | Performed by: STUDENT IN AN ORGANIZED HEALTH CARE EDUCATION/TRAINING PROGRAM

## 2022-01-01 PROCEDURE — 99497 ADVNCD CARE PLAN 30 MIN: CPT | Mod: S$GLB,,, | Performed by: NURSE PRACTITIONER

## 2022-01-01 PROCEDURE — 25000003 PHARM REV CODE 250: Performed by: EMERGENCY MEDICINE

## 2022-01-01 PROCEDURE — 99233 PR SUBSEQUENT HOSPITAL CARE,LEVL III: ICD-10-PCS | Mod: 95,,, | Performed by: PHYSICIAN ASSISTANT

## 2022-01-01 PROCEDURE — 99223 1ST HOSP IP/OBS HIGH 75: CPT | Mod: GC,,, | Performed by: INTERNAL MEDICINE

## 2022-01-01 PROCEDURE — 93005 ELECTROCARDIOGRAM TRACING: CPT

## 2022-01-01 PROCEDURE — 1125F PR PAIN SEVERITY QUANTIFIED, PAIN PRESENT: ICD-10-PCS | Mod: CPTII,S$GLB,,

## 2022-01-01 PROCEDURE — 84484 ASSAY OF TROPONIN QUANT: CPT | Performed by: EMERGENCY MEDICINE

## 2022-01-01 PROCEDURE — 99497 PR ADVNCD CARE PLAN 30 MIN: ICD-10-PCS | Mod: S$GLB,,, | Performed by: NURSE PRACTITIONER

## 2022-01-01 PROCEDURE — 3078F DIAST BP <80 MM HG: CPT | Mod: CPTII,S$GLB,, | Performed by: FAMILY MEDICINE

## 2022-01-01 PROCEDURE — 1159F PR MEDICATION LIST DOCUMENTED IN MEDICAL RECORD: ICD-10-PCS | Mod: CPTII,S$GLB,, | Performed by: FAMILY MEDICINE

## 2022-01-01 PROCEDURE — 27100171 HC OXYGEN HIGH FLOW UP TO 24 HOURS

## 2022-01-01 PROCEDURE — 3074F SYST BP LT 130 MM HG: CPT | Mod: CPTII,S$GLB,, | Performed by: FAMILY MEDICINE

## 2022-01-01 PROCEDURE — 36415 COLL VENOUS BLD VENIPUNCTURE: CPT | Performed by: INTERNAL MEDICINE

## 2022-01-01 PROCEDURE — 99900035 HC TECH TIME PER 15 MIN (STAT)

## 2022-01-01 PROCEDURE — 27000190 HC CPAP FULL FACE MASK W/VALVE

## 2022-01-01 PROCEDURE — 25000003 PHARM REV CODE 250: Performed by: STUDENT IN AN ORGANIZED HEALTH CARE EDUCATION/TRAINING PROGRAM

## 2022-01-01 PROCEDURE — 99285 EMERGENCY DEPT VISIT HI MDM: CPT | Mod: ,,, | Performed by: EMERGENCY MEDICINE

## 2022-01-01 PROCEDURE — 3075F PR MOST RECENT SYSTOLIC BLOOD PRESS GE 130-139MM HG: ICD-10-PCS | Mod: CPTII,S$GLB,,

## 2022-01-01 PROCEDURE — 1111F PR DISCHARGE MEDS RECONCILED W/ CURRENT OUTPATIENT MED LIST: ICD-10-PCS | Mod: CPTII,S$GLB,, | Performed by: FAMILY MEDICINE

## 2022-01-01 PROCEDURE — 3008F BODY MASS INDEX DOCD: CPT | Mod: CPTII,S$GLB,,

## 2022-01-01 PROCEDURE — 25000003 PHARM REV CODE 250: Performed by: INTERNAL MEDICINE

## 2022-01-01 PROCEDURE — 99214 PR OFFICE/OUTPT VISIT, EST, LEVL IV, 30-39 MIN: ICD-10-PCS | Mod: S$GLB,CS,, | Performed by: FAMILY MEDICINE

## 2022-01-01 PROCEDURE — 99999 PR PBB SHADOW E&M-EST. PATIENT-LVL III: CPT | Mod: PBBFAC,,,

## 2022-01-01 PROCEDURE — 99285 EMERGENCY DEPT VISIT HI MDM: CPT | Mod: CS,,, | Performed by: EMERGENCY MEDICINE

## 2022-01-01 PROCEDURE — 1157F ADVNC CARE PLAN IN RCRD: CPT | Mod: CPTII,S$GLB,,

## 2022-01-01 PROCEDURE — 3077F PR MOST RECENT SYSTOLIC BLOOD PRESSURE >= 140 MM HG: ICD-10-PCS | Mod: CPTII,S$GLB,, | Performed by: NURSE PRACTITIONER

## 2022-01-01 PROCEDURE — 85610 PROTHROMBIN TIME: CPT | Performed by: EMERGENCY MEDICINE

## 2022-01-01 PROCEDURE — 36415 COLL VENOUS BLD VENIPUNCTURE: CPT

## 2022-01-01 PROCEDURE — 1159F PR MEDICATION LIST DOCUMENTED IN MEDICAL RECORD: ICD-10-PCS | Mod: CPTII,S$GLB,,

## 2022-01-01 PROCEDURE — 85025 COMPLETE CBC W/AUTO DIFF WBC: CPT | Performed by: STUDENT IN AN ORGANIZED HEALTH CARE EDUCATION/TRAINING PROGRAM

## 2022-01-01 PROCEDURE — 3078F PR MOST RECENT DIASTOLIC BLOOD PRESSURE < 80 MM HG: ICD-10-PCS | Mod: CPTII,S$GLB,, | Performed by: FAMILY MEDICINE

## 2022-01-01 PROCEDURE — 83880 ASSAY OF NATRIURETIC PEPTIDE: CPT

## 2022-01-01 PROCEDURE — 83735 ASSAY OF MAGNESIUM: CPT | Performed by: INTERNAL MEDICINE

## 2022-01-01 PROCEDURE — 36415 COLL VENOUS BLD VENIPUNCTURE: CPT | Performed by: PHYSICIAN ASSISTANT

## 2022-01-01 PROCEDURE — 99214 PR OFFICE/OUTPT VISIT, EST, LEVL IV, 30-39 MIN: ICD-10-PCS | Mod: S$GLB,,,

## 2022-01-01 PROCEDURE — 3008F BODY MASS INDEX DOCD: CPT | Mod: CPTII,S$GLB,, | Performed by: FAMILY MEDICINE

## 2022-01-01 PROCEDURE — 94640 AIRWAY INHALATION TREATMENT: CPT

## 2022-01-01 PROCEDURE — 3079F PR MOST RECENT DIASTOLIC BLOOD PRESSURE 80-89 MM HG: ICD-10-PCS | Mod: CPTII,S$GLB,, | Performed by: NURSE PRACTITIONER

## 2022-01-01 PROCEDURE — 1160F RVW MEDS BY RX/DR IN RCRD: CPT | Mod: CPTII,S$GLB,, | Performed by: FAMILY MEDICINE

## 2022-01-01 PROCEDURE — U0002 COVID-19 LAB TEST NON-CDC: HCPCS | Performed by: EMERGENCY MEDICINE

## 2022-01-01 PROCEDURE — 36415 COLL VENOUS BLD VENIPUNCTURE: CPT | Mod: PO | Performed by: INTERNAL MEDICINE

## 2022-01-01 PROCEDURE — 80053 COMPREHEN METABOLIC PANEL: CPT | Performed by: INTERNAL MEDICINE

## 2022-01-01 PROCEDURE — 3008F PR BODY MASS INDEX (BMI) DOCUMENTED: ICD-10-PCS | Mod: CPTII,S$GLB,, | Performed by: NURSE PRACTITIONER

## 2022-01-01 PROCEDURE — 85652 RBC SED RATE AUTOMATED: CPT | Performed by: STUDENT IN AN ORGANIZED HEALTH CARE EDUCATION/TRAINING PROGRAM

## 2022-01-01 PROCEDURE — 4010F PR ACE/ARB THEARPY RXD/TAKEN: ICD-10-PCS | Mod: CPTII,S$GLB,, | Performed by: FAMILY MEDICINE

## 2022-01-01 PROCEDURE — 1157F PR ADVANCE CARE PLAN OR EQUIV PRESENT IN MEDICAL RECORD: ICD-10-PCS | Mod: CPTII,S$GLB,, | Performed by: FAMILY MEDICINE

## 2022-01-01 PROCEDURE — 1159F MED LIST DOCD IN RCRD: CPT | Mod: CPTII,S$GLB,,

## 2022-01-01 PROCEDURE — 99223 PR INITIAL HOSPITAL CARE,LEVL III: ICD-10-PCS | Mod: GC,,, | Performed by: INTERNAL MEDICINE

## 2022-01-01 PROCEDURE — 25000242 PHARM REV CODE 250 ALT 637 W/ HCPCS: Performed by: STUDENT IN AN ORGANIZED HEALTH CARE EDUCATION/TRAINING PROGRAM

## 2022-01-01 PROCEDURE — 3078F DIAST BP <80 MM HG: CPT | Mod: CPTII,S$GLB,,

## 2022-01-01 PROCEDURE — 4010F PR ACE/ARB THEARPY RXD/TAKEN: ICD-10-PCS | Mod: CPTII,S$GLB,,

## 2022-01-01 PROCEDURE — 3008F PR BODY MASS INDEX (BMI) DOCUMENTED: ICD-10-PCS | Mod: CPTII,S$GLB,,

## 2022-01-01 PROCEDURE — 84100 ASSAY OF PHOSPHORUS: CPT | Performed by: STUDENT IN AN ORGANIZED HEALTH CARE EDUCATION/TRAINING PROGRAM

## 2022-01-01 PROCEDURE — 27100098 HC SPACER

## 2022-01-01 PROCEDURE — 82330 ASSAY OF CALCIUM: CPT

## 2022-01-01 PROCEDURE — 80047 BASIC METABLC PNL IONIZED CA: CPT

## 2022-01-01 PROCEDURE — 99285 EMERGENCY DEPT VISIT HI MDM: CPT | Mod: 25

## 2022-01-01 PROCEDURE — 87040 BLOOD CULTURE FOR BACTERIA: CPT | Mod: 59 | Performed by: INTERNAL MEDICINE

## 2022-01-01 PROCEDURE — 1160F PR REVIEW ALL MEDS BY PRESCRIBER/CLIN PHARMACIST DOCUMENTED: ICD-10-PCS | Mod: CPTII,S$GLB,, | Performed by: NURSE PRACTITIONER

## 2022-01-01 PROCEDURE — 1126F PR PAIN SEVERITY QUANTIFIED, NO PAIN PRESENT: ICD-10-PCS | Mod: CPTII,S$GLB,, | Performed by: NURSE PRACTITIONER

## 2022-01-01 PROCEDURE — 1157F ADVNC CARE PLAN IN RCRD: CPT | Mod: CPTII,S$GLB,, | Performed by: FAMILY MEDICINE

## 2022-01-01 PROCEDURE — 3075F SYST BP GE 130 - 139MM HG: CPT | Mod: CPTII,S$GLB,,

## 2022-01-01 PROCEDURE — 36415 COLL VENOUS BLD VENIPUNCTURE: CPT | Performed by: STUDENT IN AN ORGANIZED HEALTH CARE EDUCATION/TRAINING PROGRAM

## 2022-01-01 PROCEDURE — 3288F PR FALLS RISK ASSESSMENT DOCUMENTED: ICD-10-PCS | Mod: CPTII,S$GLB,,

## 2022-01-01 PROCEDURE — 94660 CPAP INITIATION&MGMT: CPT

## 2022-01-01 PROCEDURE — 4010F PR ACE/ARB THEARPY RXD/TAKEN: ICD-10-PCS | Mod: CPTII,S$GLB,, | Performed by: NURSE PRACTITIONER

## 2022-01-01 PROCEDURE — 3077F SYST BP >= 140 MM HG: CPT | Mod: CPTII,S$GLB,, | Performed by: NURSE PRACTITIONER

## 2022-01-01 PROCEDURE — 1160F RVW MEDS BY RX/DR IN RCRD: CPT | Mod: CPTII,S$GLB,, | Performed by: NURSE PRACTITIONER

## 2022-01-01 PROCEDURE — 85025 COMPLETE CBC W/AUTO DIFF WBC: CPT | Performed by: PHYSICIAN ASSISTANT

## 2022-01-01 PROCEDURE — 99999 PR PBB SHADOW E&M-EST. PATIENT-LVL III: ICD-10-PCS | Mod: PBBFAC,,,

## 2022-01-01 PROCEDURE — 99214 OFFICE O/P EST MOD 30 MIN: CPT | Mod: S$GLB,,,

## 2022-01-01 PROCEDURE — 99233 SBSQ HOSP IP/OBS HIGH 50: CPT | Mod: ,,, | Performed by: INTERNAL MEDICINE

## 2022-01-01 PROCEDURE — 99233 SBSQ HOSP IP/OBS HIGH 50: CPT | Mod: ,,, | Performed by: PHYSICIAN ASSISTANT

## 2022-01-01 PROCEDURE — 27000221 HC OXYGEN, UP TO 24 HOURS

## 2022-01-01 PROCEDURE — 99233 PR SUBSEQUENT HOSPITAL CARE,LEVL III: ICD-10-PCS | Mod: ,,, | Performed by: PHYSICIAN ASSISTANT

## 2022-01-01 PROCEDURE — 99495 TCM SERVICES (MODERATE COMPLEXITY): ICD-10-PCS | Mod: S$GLB,,, | Performed by: NURSE PRACTITIONER

## 2022-01-01 PROCEDURE — 3079F DIAST BP 80-89 MM HG: CPT | Mod: CPTII,S$GLB,, | Performed by: NURSE PRACTITIONER

## 2022-01-01 PROCEDURE — 85025 COMPLETE CBC W/AUTO DIFF WBC: CPT | Performed by: EMERGENCY MEDICINE

## 2022-01-01 PROCEDURE — 83880 ASSAY OF NATRIURETIC PEPTIDE: CPT | Performed by: EMERGENCY MEDICINE

## 2022-01-01 PROCEDURE — 1159F MED LIST DOCD IN RCRD: CPT | Mod: CPTII,S$GLB,, | Performed by: FAMILY MEDICINE

## 2022-01-01 PROCEDURE — 25000003 PHARM REV CODE 250: Performed by: HOSPITALIST

## 2022-01-01 PROCEDURE — 20600001 HC STEP DOWN PRIVATE ROOM

## 2022-01-01 PROCEDURE — 1157F PR ADVANCE CARE PLAN OR EQUIV PRESENT IN MEDICAL RECORD: ICD-10-PCS | Mod: CPTII,S$GLB,,

## 2022-01-01 PROCEDURE — 85025 COMPLETE CBC W/AUTO DIFF WBC: CPT | Performed by: INTERNAL MEDICINE

## 2022-01-01 PROCEDURE — 80053 COMPREHEN METABOLIC PANEL: CPT | Performed by: STUDENT IN AN ORGANIZED HEALTH CARE EDUCATION/TRAINING PROGRAM

## 2022-01-01 PROCEDURE — 84484 ASSAY OF TROPONIN QUANT: CPT | Performed by: STUDENT IN AN ORGANIZED HEALTH CARE EDUCATION/TRAINING PROGRAM

## 2022-01-01 PROCEDURE — 1157F PR ADVANCE CARE PLAN OR EQUIV PRESENT IN MEDICAL RECORD: ICD-10-PCS | Mod: CPTII,S$GLB,, | Performed by: NURSE PRACTITIONER

## 2022-01-01 PROCEDURE — 1111F DSCHRG MED/CURRENT MED MERGE: CPT | Mod: CPTII,S$GLB,,

## 2022-01-01 PROCEDURE — 99214 OFFICE O/P EST MOD 30 MIN: CPT | Mod: S$GLB,CS,, | Performed by: FAMILY MEDICINE

## 2022-01-01 PROCEDURE — 99999 PR PBB SHADOW E&M-EST. PATIENT-LVL IV: CPT | Mod: PBBFAC,,, | Performed by: FAMILY MEDICINE

## 2022-01-01 PROCEDURE — 1101F PT FALLS ASSESS-DOCD LE1/YR: CPT | Mod: CPTII,S$GLB,, | Performed by: NURSE PRACTITIONER

## 2022-01-01 PROCEDURE — 80048 BASIC METABOLIC PNL TOTAL CA: CPT | Performed by: STUDENT IN AN ORGANIZED HEALTH CARE EDUCATION/TRAINING PROGRAM

## 2022-01-01 PROCEDURE — 63600175 PHARM REV CODE 636 W HCPCS: Performed by: INTERNAL MEDICINE

## 2022-01-01 PROCEDURE — 99223 PR INITIAL HOSPITAL CARE,LEVL III: ICD-10-PCS | Mod: ,,, | Performed by: INTERNAL MEDICINE

## 2022-01-01 PROCEDURE — 4010F ACE/ARB THERAPY RXD/TAKEN: CPT | Mod: CPTII,S$GLB,, | Performed by: FAMILY MEDICINE

## 2022-01-01 PROCEDURE — 99223 1ST HOSP IP/OBS HIGH 75: CPT | Mod: ,,, | Performed by: INTERNAL MEDICINE

## 2022-01-01 PROCEDURE — 81001 URINALYSIS AUTO W/SCOPE: CPT | Performed by: EMERGENCY MEDICINE

## 2022-01-01 PROCEDURE — 1111F PR DISCHARGE MEDS RECONCILED W/ CURRENT OUTPATIENT MED LIST: ICD-10-PCS | Mod: CPTII,S$GLB,,

## 2022-01-01 PROCEDURE — 1159F MED LIST DOCD IN RCRD: CPT | Mod: CPTII,S$GLB,, | Performed by: NURSE PRACTITIONER

## 2022-01-01 PROCEDURE — 1157F ADVNC CARE PLAN IN RCRD: CPT | Mod: CPTII,S$GLB,, | Performed by: NURSE PRACTITIONER

## 2022-01-01 PROCEDURE — 93010 EKG 12-LEAD: ICD-10-PCS | Mod: ,,, | Performed by: INTERNAL MEDICINE

## 2022-01-01 PROCEDURE — 80053 COMPREHEN METABOLIC PANEL: CPT | Performed by: EMERGENCY MEDICINE

## 2022-01-01 PROCEDURE — 3008F BODY MASS INDEX DOCD: CPT | Mod: CPTII,S$GLB,, | Performed by: NURSE PRACTITIONER

## 2022-01-01 PROCEDURE — 99233 PR SUBSEQUENT HOSPITAL CARE,LEVL III: ICD-10-PCS | Mod: ,,, | Performed by: INTERNAL MEDICINE

## 2022-01-01 PROCEDURE — 3288F FALL RISK ASSESSMENT DOCD: CPT | Mod: CPTII,S$GLB,, | Performed by: NURSE PRACTITIONER

## 2022-01-01 PROCEDURE — 1125F AMNT PAIN NOTED PAIN PRSNT: CPT | Mod: CPTII,S$GLB,,

## 2022-01-01 PROCEDURE — 1159F PR MEDICATION LIST DOCUMENTED IN MEDICAL RECORD: ICD-10-PCS | Mod: CPTII,S$GLB,, | Performed by: NURSE PRACTITIONER

## 2022-01-01 PROCEDURE — 3288F FALL RISK ASSESSMENT DOCD: CPT | Mod: CPTII,S$GLB,,

## 2022-01-01 PROCEDURE — 1160F PR REVIEW ALL MEDS BY PRESCRIBER/CLIN PHARMACIST DOCUMENTED: ICD-10-PCS | Mod: CPTII,S$GLB,, | Performed by: FAMILY MEDICINE

## 2022-01-01 PROCEDURE — 1101F PR PT FALLS ASSESS DOC 0-1 FALLS W/OUT INJ PAST YR: ICD-10-PCS | Mod: CPTII,S$GLB,, | Performed by: NURSE PRACTITIONER

## 2022-01-01 PROCEDURE — 4010F ACE/ARB THERAPY RXD/TAKEN: CPT | Mod: CPTII,S$GLB,,

## 2022-01-01 PROCEDURE — 1101F PT FALLS ASSESS-DOCD LE1/YR: CPT | Mod: CPTII,S$GLB,,

## 2022-01-01 PROCEDURE — 99233 SBSQ HOSP IP/OBS HIGH 50: CPT | Mod: 95,,, | Performed by: PHYSICIAN ASSISTANT

## 2022-01-01 PROCEDURE — 1101F PR PT FALLS ASSESS DOC 0-1 FALLS W/OUT INJ PAST YR: ICD-10-PCS | Mod: CPTII,S$GLB,,

## 2022-01-01 PROCEDURE — 99999 PR PBB SHADOW E&M-EST. PATIENT-LVL IV: ICD-10-PCS | Mod: PBBFAC,,, | Performed by: FAMILY MEDICINE

## 2022-01-01 PROCEDURE — 99285 PR EMERGENCY DEPT VISIT,LEVEL V: ICD-10-PCS | Mod: CS,,, | Performed by: EMERGENCY MEDICINE

## 2022-01-01 PROCEDURE — 83880 ASSAY OF NATRIURETIC PEPTIDE: CPT | Performed by: INTERNAL MEDICINE

## 2022-01-01 PROCEDURE — 86140 C-REACTIVE PROTEIN: CPT | Performed by: STUDENT IN AN ORGANIZED HEALTH CARE EDUCATION/TRAINING PROGRAM

## 2022-01-01 PROCEDURE — 1111F DSCHRG MED/CURRENT MED MERGE: CPT | Mod: CPTII,S$GLB,, | Performed by: FAMILY MEDICINE

## 2022-01-01 PROCEDURE — 3008F PR BODY MASS INDEX (BMI) DOCUMENTED: ICD-10-PCS | Mod: CPTII,S$GLB,, | Performed by: FAMILY MEDICINE

## 2022-01-01 PROCEDURE — 81001 URINALYSIS AUTO W/SCOPE: CPT | Performed by: STUDENT IN AN ORGANIZED HEALTH CARE EDUCATION/TRAINING PROGRAM

## 2022-01-01 PROCEDURE — 83880 ASSAY OF NATRIURETIC PEPTIDE: CPT | Performed by: STUDENT IN AN ORGANIZED HEALTH CARE EDUCATION/TRAINING PROGRAM

## 2022-01-01 RX ORDER — ALBUTEROL SULFATE 90 UG/1
2 AEROSOL, METERED RESPIRATORY (INHALATION) 4 TIMES DAILY
Status: DISCONTINUED | OUTPATIENT
Start: 2022-01-01 | End: 2022-01-01 | Stop reason: HOSPADM

## 2022-01-01 RX ORDER — ATORVASTATIN CALCIUM 20 MG/1
80 TABLET, FILM COATED ORAL NIGHTLY
Status: DISCONTINUED | OUTPATIENT
Start: 2022-01-01 | End: 2022-01-01 | Stop reason: HOSPADM

## 2022-01-01 RX ORDER — IBUPROFEN 200 MG
16 TABLET ORAL
Status: DISCONTINUED | OUTPATIENT
Start: 2022-01-01 | End: 2022-01-01

## 2022-01-01 RX ORDER — BUDESONIDE AND FORMOTEROL FUMARATE DIHYDRATE 160; 4.5 UG/1; UG/1
2 AEROSOL RESPIRATORY (INHALATION) EVERY 12 HOURS
Status: DISCONTINUED | OUTPATIENT
Start: 2022-01-01 | End: 2022-01-01 | Stop reason: HOSPADM

## 2022-01-01 RX ORDER — LANOLIN ALCOHOL/MO/W.PET/CERES
1 CREAM (GRAM) TOPICAL DAILY
Status: DISCONTINUED | OUTPATIENT
Start: 2022-01-01 | End: 2022-01-01 | Stop reason: HOSPADM

## 2022-01-01 RX ORDER — HYDROCODONE BITARTRATE AND ACETAMINOPHEN 5; 325 MG/1; MG/1
1 TABLET ORAL EVERY 6 HOURS PRN
Status: DISCONTINUED | OUTPATIENT
Start: 2022-01-01 | End: 2022-01-01

## 2022-01-01 RX ORDER — GUAIFENESIN AND CODEINE PHOSPHATE 10; 100 MG/5ML; MG/5ML
LIQUID ORAL
COMMUNITY
Start: 2022-01-01

## 2022-01-01 RX ORDER — BUMETANIDE 1 MG/1
1 TABLET ORAL DAILY
Status: DISCONTINUED | OUTPATIENT
Start: 2022-01-01 | End: 2022-01-01 | Stop reason: HOSPADM

## 2022-01-01 RX ORDER — ALLOPURINOL 100 MG/1
100 TABLET ORAL DAILY
Status: DISCONTINUED | OUTPATIENT
Start: 2022-01-01 | End: 2022-01-01 | Stop reason: HOSPADM

## 2022-01-01 RX ORDER — PANTOPRAZOLE SODIUM 40 MG/1
40 TABLET, DELAYED RELEASE ORAL DAILY
Status: DISCONTINUED | OUTPATIENT
Start: 2022-01-01 | End: 2022-01-01 | Stop reason: HOSPADM

## 2022-01-01 RX ORDER — CETIRIZINE HYDROCHLORIDE 5 MG/1
5 TABLET ORAL DAILY
Status: DISCONTINUED | OUTPATIENT
Start: 2022-01-01 | End: 2022-01-01 | Stop reason: HOSPADM

## 2022-01-01 RX ORDER — LANOLIN ALCOHOL/MO/W.PET/CERES
400 CREAM (GRAM) TOPICAL DAILY
Status: DISCONTINUED | OUTPATIENT
Start: 2022-01-01 | End: 2022-01-01 | Stop reason: HOSPADM

## 2022-01-01 RX ORDER — TRAMADOL HYDROCHLORIDE 50 MG/1
50 TABLET ORAL EVERY 6 HOURS PRN
Status: DISCONTINUED | OUTPATIENT
Start: 2022-01-01 | End: 2022-01-01 | Stop reason: HOSPADM

## 2022-01-01 RX ORDER — LOSARTAN POTASSIUM 50 MG/1
50 TABLET ORAL DAILY
Refills: 1 | Status: DISCONTINUED | OUTPATIENT
Start: 2022-01-01 | End: 2022-01-01 | Stop reason: HOSPADM

## 2022-01-01 RX ORDER — LEVOFLOXACIN 5 MG/ML
750 INJECTION, SOLUTION INTRAVENOUS
Status: DISCONTINUED | OUTPATIENT
Start: 2022-01-01 | End: 2022-01-01

## 2022-01-01 RX ORDER — IBUPROFEN 200 MG
24 TABLET ORAL
Status: DISCONTINUED | OUTPATIENT
Start: 2022-01-01 | End: 2022-01-01

## 2022-01-01 RX ORDER — GLUCAGON 1 MG
1 KIT INJECTION
Status: DISCONTINUED | OUTPATIENT
Start: 2022-01-01 | End: 2022-01-01

## 2022-01-01 RX ORDER — HYDROCODONE BITARTRATE AND ACETAMINOPHEN 5; 325 MG/1; MG/1
1 TABLET ORAL
Status: COMPLETED | OUTPATIENT
Start: 2022-01-01 | End: 2022-01-01

## 2022-01-01 RX ORDER — ACETAMINOPHEN 325 MG/1
650 TABLET ORAL EVERY 6 HOURS PRN
Status: DISCONTINUED | OUTPATIENT
Start: 2022-01-01 | End: 2022-01-01 | Stop reason: HOSPADM

## 2022-01-01 RX ORDER — INSULIN ASPART 100 [IU]/ML
0-5 INJECTION, SOLUTION INTRAVENOUS; SUBCUTANEOUS
Status: DISCONTINUED | OUTPATIENT
Start: 2022-01-01 | End: 2022-01-01

## 2022-01-01 RX ORDER — SODIUM CHLORIDE 0.9 % (FLUSH) 0.9 %
10 SYRINGE (ML) INJECTION
Status: DISCONTINUED | OUTPATIENT
Start: 2022-01-01 | End: 2022-01-01 | Stop reason: HOSPADM

## 2022-01-01 RX ORDER — FOLIC ACID 1 MG/1
1000 TABLET ORAL DAILY
Status: DISCONTINUED | OUTPATIENT
Start: 2022-01-01 | End: 2022-01-01 | Stop reason: HOSPADM

## 2022-01-01 RX ORDER — MONTELUKAST SODIUM 10 MG/1
10 TABLET ORAL DAILY
Status: DISCONTINUED | OUTPATIENT
Start: 2022-01-01 | End: 2022-01-01 | Stop reason: HOSPADM

## 2022-01-01 RX ORDER — FLUTICASONE PROPIONATE 50 MCG
2 SPRAY, SUSPENSION (ML) NASAL DAILY
Status: DISCONTINUED | OUTPATIENT
Start: 2022-01-01 | End: 2022-01-01 | Stop reason: HOSPADM

## 2022-01-01 RX ADMIN — LEVOFLOXACIN 750 MG: 750 INJECTION, SOLUTION INTRAVENOUS at 08:01

## 2022-01-01 RX ADMIN — TRAMADOL HYDROCHLORIDE 50 MG: 50 TABLET ORAL at 11:01

## 2022-01-01 RX ADMIN — RIOCIGUAT 2.5 MG: 2.5 TABLET, FILM COATED ORAL at 09:01

## 2022-01-01 RX ADMIN — FOLIC ACID 1000 MCG: 1 TABLET ORAL at 08:01

## 2022-01-01 RX ADMIN — LOSARTAN POTASSIUM 50 MG: 50 TABLET, FILM COATED ORAL at 08:01

## 2022-01-01 RX ADMIN — RIOCIGUAT 2.5 MG: 2.5 TABLET, FILM COATED ORAL at 08:01

## 2022-01-01 RX ADMIN — RIOCIGUAT 2.5 MG: 2.5 TABLET, FILM COATED ORAL at 03:01

## 2022-01-01 RX ADMIN — MACITENTAN 10 MG: 10 TABLET, FILM COATED ORAL at 08:01

## 2022-01-01 RX ADMIN — MONTELUKAST 10 MG: 10 TABLET, FILM COATED ORAL at 08:01

## 2022-01-01 RX ADMIN — FERROUS SULFATE TAB 325 MG (65 MG ELEMENTAL FE) 1 EACH: 325 (65 FE) TAB at 08:01

## 2022-01-01 RX ADMIN — BUDESONIDE AND FORMOTEROL FUMARATE DIHYDRATE 2 PUFF: 160; 4.5 AEROSOL RESPIRATORY (INHALATION) at 09:01

## 2022-01-01 RX ADMIN — ATORVASTATIN CALCIUM 80 MG: 20 TABLET, FILM COATED ORAL at 08:01

## 2022-01-01 RX ADMIN — ALLOPURINOL 100 MG: 100 TABLET ORAL at 08:01

## 2022-01-01 RX ADMIN — RIOCIGUAT 2.5 MG: 2.5 TABLET, FILM COATED ORAL at 04:01

## 2022-01-01 RX ADMIN — PANTOPRAZOLE SODIUM 40 MG: 40 TABLET, DELAYED RELEASE ORAL at 08:01

## 2022-01-01 RX ADMIN — VANCOMYCIN HYDROCHLORIDE 1500 MG: 1.5 INJECTION, POWDER, LYOPHILIZED, FOR SOLUTION INTRAVENOUS at 08:01

## 2022-01-01 RX ADMIN — ACETAMINOPHEN 650 MG: 325 TABLET ORAL at 06:01

## 2022-01-01 RX ADMIN — ALBUTEROL SULFATE 2 PUFF: 108 INHALANT RESPIRATORY (INHALATION) at 06:01

## 2022-01-01 RX ADMIN — ALBUTEROL SULFATE 2 PUFF: 108 INHALANT RESPIRATORY (INHALATION) at 10:01

## 2022-01-01 RX ADMIN — BUMETANIDE 1 MG: 1 TABLET ORAL at 08:01

## 2022-01-01 RX ADMIN — TIOTROPIUM BROMIDE INHALATION SPRAY 2 PUFF: 3.12 SPRAY, METERED RESPIRATORY (INHALATION) at 09:01

## 2022-01-01 RX ADMIN — ALBUTEROL SULFATE 2 PUFF: 108 INHALANT RESPIRATORY (INHALATION) at 12:01

## 2022-01-01 RX ADMIN — BUDESONIDE AND FORMOTEROL FUMARATE DIHYDRATE 2 PUFF: 160; 4.5 AEROSOL RESPIRATORY (INHALATION) at 08:01

## 2022-01-01 RX ADMIN — CETIRIZINE HYDROCHLORIDE 5 MG: 5 TABLET ORAL at 08:01

## 2022-01-01 RX ADMIN — Medication 400 MG: at 08:01

## 2022-01-01 RX ADMIN — ALBUTEROL SULFATE 2 PUFF: 108 INHALANT RESPIRATORY (INHALATION) at 11:01

## 2022-01-01 RX ADMIN — MACITENTAN 10 MG: 10 TABLET, FILM COATED ORAL at 03:01

## 2022-01-01 RX ADMIN — IOHEXOL 75 ML: 350 INJECTION, SOLUTION INTRAVENOUS at 02:01

## 2022-01-01 RX ADMIN — HYDROCODONE BITARTRATE AND ACETAMINOPHEN 1 TABLET: 5; 325 TABLET ORAL at 11:01

## 2022-01-01 RX ADMIN — RIOCIGUAT 2.5 MG: 2.5 TABLET, FILM COATED ORAL at 02:01

## 2022-01-01 RX ADMIN — ALBUTEROL SULFATE 2 PUFF: 108 INHALANT RESPIRATORY (INHALATION) at 05:01

## 2022-01-01 RX ADMIN — ATORVASTATIN CALCIUM 80 MG: 20 TABLET, FILM COATED ORAL at 09:01

## 2022-01-01 RX ADMIN — TIOTROPIUM BROMIDE INHALATION SPRAY 2 PUFF: 3.12 SPRAY, METERED RESPIRATORY (INHALATION) at 08:01

## 2022-01-01 RX ADMIN — FLUTICASONE PROPIONATE 100 MCG: 50 SPRAY, METERED NASAL at 08:01

## 2022-01-09 NOTE — ED PROVIDER NOTES
Encounter Date: 1/8/2022       History     Chief Complaint   Patient presents with    Pain     Reports all over pain    Chills     Ms. Davis is a 68 y.o. AAF with mild obstructive lung disease, severe pulmonary hypertension (WHO group 1), moderate to severe RV dysfunction, chronic hypercapnic respiratory failure, obesity, active tobacco dependence, GLENN on CPAP, and diabetes who presents to the emergency department due to generalized body aches, abdominal pain and shortness of breath.  She states that for a week she has been having pain especially in her shoulders and bilateral hips. Additionally she has been having abdominal pain which she says is all over her abdomen, she also has been feeling extremely fatigued. She states that she is sob due to her pulmonary hypertension but this is much worse. She denies fever, chills, nausea, vomiting or chest pain.         Review of patient's allergies indicates:   Allergen Reactions    Ace inhibitors     Penicillin     Penicillins Itching    Sulfa (sulfonamide antibiotics) Itching    Sulfadiazine      Past Medical History:   Diagnosis Date    Asthma     COPD (chronic obstructive pulmonary disease)     Diastolic dysfunction with heart failure 5/20/2015    Occl RCA, high grade prox LAD and LCfx cath [facility] Dec 2014 5/11/2015    Pulmonary HTN     Sleep apnea     Sleep apnea      Past Surgical History:   Procedure Laterality Date    BACK SURGERY      HYSTERECTOMY      RIGHT HEART CATHETERIZATION Right 9/17/2018    Procedure: HEART CATH-RIGHT;  Surgeon: Le Salmeron MD;  Location: [facility] CATH LAB;  Service: Cardiology;  Laterality: Right;    RIGHT HEART CATHETERIZATION Right 7/8/2019    Procedure: HEART CATH-RIGHT;  Surgeon: Le Salmeron MD;  Location: [facility] CATH LAB;  Service: Cardiology;  Laterality: Right;     Family History   Problem Relation Age of Onset    Cancer Sister      Social History     Tobacco Use    Smoking status: Former Smoker      Packs/day: 0.25     Years: 15.00     Pack years: 3.75     Types: Cigarettes     Quit date: 2016     Years since quittin.1    Smokeless tobacco: Never Used   Substance Use Topics    Alcohol use: No    Drug use: No     Review of Systems   Constitutional: Positive for fatigue. Negative for activity change, appetite change, chills and fever.   HENT: Negative for congestion, drooling, ear pain, nosebleeds, postnasal drip, rhinorrhea, sinus pain, sore throat and trouble swallowing.    Respiratory: Positive for shortness of breath. Negative for cough, choking and wheezing.    Cardiovascular: Negative for chest pain, palpitations and leg swelling.   Gastrointestinal: Negative for abdominal pain, constipation, diarrhea, nausea and vomiting.   Genitourinary: Negative for difficulty urinating, dysuria, flank pain, frequency, hematuria, pelvic pain and urgency.   Musculoskeletal: Positive for myalgias. Negative for arthralgias, back pain, gait problem and joint swelling.   Skin: Negative for color change.   Neurological: Negative for dizziness, seizures, syncope, weakness, light-headedness, numbness and headaches.   Psychiatric/Behavioral: Negative for agitation, confusion and decreased concentration. The patient is not nervous/anxious.        Physical Exam     Initial Vitals [22 1638]   BP Pulse Resp Temp SpO2   (!) 127/59 101 20 99.5 °F (37.5 °C) (!) 91 %      MAP       --         Physical Exam    Nursing note and vitals reviewed.  Constitutional: She appears well-developed and well-nourished. She is not diaphoretic. No distress.   HENT:   Head: Normocephalic and atraumatic.   Mouth/Throat: Oropharynx is clear and moist.   Eyes: Conjunctivae and EOM are normal. Pupils are equal, round, and reactive to light. Right eye exhibits no discharge. Left eye exhibits no discharge.   Neck: No tracheal deviation present. No JVD present.   Normal range of motion.  Cardiovascular: Normal rate, regular rhythm, normal heart  sounds and intact distal pulses. Exam reveals no gallop and no friction rub.    No murmur heard.  Pulmonary/Chest: Breath sounds normal. She has no wheezes. She has no rales. She exhibits no tenderness.   Abdominal: Abdomen is soft. Bowel sounds are normal. She exhibits no distension and no mass. There is abdominal tenderness.   Generalized abdominal tenderness There is no rebound and no guarding.   Musculoskeletal:         General: No tenderness or edema. Normal range of motion.      Cervical back: Normal range of motion.     Neurological: She is alert and oriented to person, place, and time. She has normal strength. She displays normal reflexes. No sensory deficit.   Skin: Skin is warm. Capillary refill takes less than 2 seconds. No erythema.   Psychiatric: She has a normal mood and affect.         ED Course   Procedures  Labs Reviewed   CBC W/ AUTO DIFFERENTIAL - Abnormal; Notable for the following components:       Result Value    RBC 3.71 (*)     Hemoglobin 9.9 (*)     Hematocrit 32.1 (*)     MCH 26.7 (*)     MCHC 30.8 (*)     RDW 15.1 (*)     Immature Granulocytes 0.6 (*)     Lymph # 0.7 (*)     Gran % 78.2 (*)     Lymph % 10.3 (*)     All other components within normal limits   COMPREHENSIVE METABOLIC PANEL - Abnormal; Notable for the following components:    Sodium 134 (*)     Glucose 120 (*)     BUN 51 (*)     Creatinine 2.2 (*)     Albumin 3.0 (*)     eGFR if  25.4 (*)     eGFR if non  22.1 (*)     All other components within normal limits   URINALYSIS, REFLEX TO URINE CULTURE - Abnormal; Notable for the following components:    Appearance, UA Hazy (*)     All other components within normal limits    Narrative:     Specimen Source->Urine   SEDIMENTATION RATE - Abnormal; Notable for the following components:    Sed Rate >120 (*)     All other components within normal limits   C-REACTIVE PROTEIN - Abnormal; Notable for the following components:    .6 (*)     All other  components within normal limits   URINALYSIS MICROSCOPIC - Abnormal; Notable for the following components:    Bacteria Moderate (*)     Hyaline Casts, UA 2 (*)     All other components within normal limits    Narrative:     Specimen Source->Urine   ISTAT PROCEDURE - Abnormal; Notable for the following components:    POC Glucose 119 (*)     POC BUN 48 (*)     POC Creatinine 2.2 (*)     POC Hematocrit 31 (*)     All other components within normal limits   TROPONIN I   B-TYPE NATRIURETIC PEPTIDE   MAGNESIUM   PHOSPHORUS   SARS-COV-2 RDRP GENE    Narrative:     This test utilizes isothermal nucleic acid amplification   technology to detect the SARS-CoV-2 RdRp nucleic acid segment.   The analytical sensitivity (limit of detection) is 125 genome   equivalents/mL.   A POSITIVE result implies infection with the SARS-CoV-2 virus;   the patient is presumed to be contagious.     A NEGATIVE result means that SARS-CoV-2 nucleic acids are not   present above the limit of detection. A NEGATIVE result should be   treated as presumptive. It does not rule out the possibility of   COVID-19 and should not be the sole basis for treatment decisions.   If COVID-19 is strongly suspected based on clinical and exposure   history, re-testing using an alternate molecular assay should be   considered.   This test is only for use under the Food and Drug   Administration s Emergency Use Authorization (EUA).   Commercial kits are provided by [employer].   Performance characteristics of the EUA have been independently   verified by [facility] Department of   Pathology and Laboratory Medicine.   _________________________________________________________________   The authorized Fact Sheet for Healthcare Providers and the authorized Fact   Sheet for Patients of the ID NOW COVID-19 are available on the FDA   website:     https://www.fda.gov/media/844229/download  https://www.fda.gov/media/313435/download         POCT INFLUENZA A/B  MOLECULAR    Narrative:     This test utilizes isothermal nucleic acid amplification   technology to detect the SARS-CoV-2 RdRp nucleic acid segment.   The analytical sensitivity (limit of detection) is 125 genome   equivalents/mL.   A POSITIVE result implies infection with the SARS-CoV-2 virus;   the patient is presumed to be contagious.     A NEGATIVE result means that SARS-CoV-2 nucleic acids are not   present above the limit of detection. A NEGATIVE result should be   treated as presumptive. It does not rule out the possibility of   COVID-19 and should not be the sole basis for treatment decisions.   If COVID-19 is strongly suspected based on clinical and exposure   history, re-testing using an alternate molecular assay should be   considered.   This test is only for use under the Food and Drug   Administration s Emergency Use Authorization (EUA).   Commercial kits are provided by [employer].   Performance characteristics of the EUA have been independently   verified by [facility] Department of   Pathology and Laboratory Medicine.   _________________________________________________________________   The authorized Fact Sheet for Healthcare Providers and the authorized Fact   Sheet for Patients of the ID NOW COVID-19 are available on the FDA   website:     https://www.fda.gov/media/212325/download  https://www.fda.gov/media/092042/download                Imaging Results          X-Ray Chest AP Portable (Final result)  Result time 01/08/22 19:53:36    Final result by Cam Lugo MD (01/08/22 19:53:36)                 Impression:      Pulmonary findings may reflect chronic edema however interval viral process is not excluded in this setting.  Obscuration of the left costophrenic angle may reflect effusion versus accentuation from overlying soft tissue, correlation is advised.  Patient has known pericardial effusion, better evaluated 01/08/2022.    Electronically signed by resident: Mariana  Hannah  Date:    01/08/2022  Time:    19:41    Electronically signed by: Cam Lugo MD  Date:    01/08/2022  Time:    19:53             Narrative:    EXAMINATION:  XR CHEST AP PORTABLE    CLINICAL HISTORY:  Shortness of breath;.    TECHNIQUE:  Single frontal portable view of the chest was performed.    COMPARISON:  Chest radiograph 02/06/2016.    FINDINGS:  There is bilateral patchy increased parenchymal attenuation projected over the bilateral lower lung zones..  Coarse interstitial markings throughout the bilateral lung zones, similar to prior exam.No pneumothorax.  There is obscuration of the left costophrenic angle possibly reflecting effusion versus obscuration by overlying soft tissues    The cardiac silhouette is enlarged, similar to prior exam.  Calcification of the aortic arch..    Bones are intact.                                CT Abdomen Pelvis  Without Contrast (Final result)  Result time 01/08/22 19:29:30    Final result by Cam Lugo MD (01/08/22 19:29:30)                 Impression:      This report was flagged in Epic as abnormal.    1. Moderate to large pericardial effusion, correlation advised.  2. Bilateral perinephric fat stranding, correlation with urinalysis recommended as well as with renal function testing.  3. Strand-like fluid in the abdomen and pelvis, may reflect volume overload.  4. Colonic diverticulosis without diverticulitis.  5. Cholelithiasis without secondary findings to suggest acute cholecystitis.  6. Additional findings above.      Electronically signed by: Cam Lugo MD  Date:    01/08/2022  Time:    19:29             Narrative:    EXAMINATION:  CT ABDOMEN PELVIS WITHOUT CONTRAST    CLINICAL HISTORY:  Abdominal pain, acute, nonlocalized;    TECHNIQUE:  Low dose axial images, sagittal and coronal reformations were obtained from the lung bases to the pubic symphysis.  Oral contrast was not administered.    COMPARISON:  CT 11/30/2019    FINDINGS:  Images of  the lower thorax are remarkable for bilateral dependent atelectasis.  There is a moderate to large pericardial effusion.    Allowing for motion artifact, the liver, spleen, pancreas and adrenal glands are grossly unremarkable.  There is cholelithiasis without secondary findings to suggest acute cholecystitis.  There are a few scattered prominent abdominal lymph nodes.  There is no biliary dilation.  There is strand-like fluid in the pelvis.    There is bilateral perinephric fat stranding.  There is no hydronephrosis or nephrolithiasis.  There are several subcentimeter low attenuating lesions involving the left kidney, too small for characterization.  There is a 1.9 cm low attenuating lesion within the interpolar region of the left kidney, attenuation suggests cyst.  The bilateral ureters are unremarkable without calculi seen.  The urinary bladder is unremarkable.  The uterus is absent the adnexa is unremarkable.    There are several scattered colonic diverticula without convincing inflammatory change to suggest diverticulitis.  The terminal ileum is unremarkable noting lipomatous hypertrophy at the ileocecal valve.  The appendix is not confidently identified, no pericecal inflammation.  The small bowel is grossly unremarkable.  There is atherosclerotic calcification of the aorta and its branches.  Several scattered shotty periaortic and paracaval lymph nodes are noted.    Degenerative changes are noted of the spine.  There is osteopenia.  No significant inguinal lymphadenopathy.                                 Medications - No data to display  Medical Decision Making:   Initial Assessment:   Ms. Davis is a 68 y.o. AAF with mild obstructive lung disease, severe pulmonary hypertension (WHO group 1), moderate to severe RV dysfunction, chronic hypercapnic respiratory failure, obesity, active tobacco dependence, GLENN on CPAP, and diabetes who presents to the emergency department due to generalized body aches,  abdominal pain and shortness of breath.   Differential Diagnosis:   Pneumonia  UTI  Osteomyelitis  Viral URI   Sepsis  Clinical Tests:   Lab Tests: Ordered and Reviewed  Radiological Study: Ordered and Reviewed  ED Management:  Patient was examined, labs were ordered:  CBC was significant for hemoglobin of 9.9  CMP was significant for elevated BUN and creatinine  Urinalysis did not show convincing signs of infection.   EKG and troponin were within normal limits  Patient's CT was significant for Moderate to large pericardial effusion.   Discussion was had with Cardiology concerning the patient's CT scan results. They stated that patient would follow up with them outpatient but there were no emergent interventions at this time.   Patient was re-evaluated and she reported symptomatic improvement.   She was discharged in stable condition and return precautions were given.                         Clinical Impression:   Final diagnoses:  [R06.02] Shortness of breath  [I27.20] Pulmonary hypertension (Primary)          ED Disposition Condition    Discharge Stable        ED Prescriptions     None        Follow-up Information     Follow up With Specialties Details Why Contact Info    Suhas Beltran MD Internal Medicine Schedule an appointment as soon as possible for a visit in 3 days  [address] [facility]  [address]   INTERNAL MEDICINE  [facility] [address]  [phone]             Clive Dickens MD  Resident  01/09/22 0023

## 2022-01-09 NOTE — DISCHARGE INSTRUCTIONS
Thank you for visiting us Today!    Please follow up with cardiology concerning your CT exam findings.     CT Abdomen   1. Moderate to large pericardial effusion, correlation advised.  2. Bilateral perinephric fat stranding, correlation with urinalysis recommended as well as with renal function testing.  3. Strand-like fluid in the abdomen and pelvis, may reflect volume overload.  4. Colonic diverticulosis without diverticulitis.  5. Cholelithiasis without secondary findings to suggest acute cholecystitis.  6. Additional findings above

## 2022-01-09 NOTE — ED NOTES
I-STAT Chem-8+ Results:   Value Reference Range   Sodium 137 136-145 mmol/L   Potassium  4.7 3.5-5.1 mmol/L   Chloride 103  mmol/L   Ionized Calcium 1.14 1.06-1.42 mmol/L   CO2 (measured) 24 23-29 mmol/L   Glucose 119  mg/dL   BUN 48 6-30 mg/dL   Creatinine 2.2 0.5-1.4 mg/dL   Hematocrit 31 36-54%

## 2022-01-09 NOTE — ED TRIAGE NOTES
Patient presents to the ER with left shoulder pain, hip pain on both sides, and right sided flank pain that started about a week ago. Patient states she went to her primary care doctor and they couldn't find anything wrong. Patient is on 2L at all time.

## 2022-01-12 NOTE — TELEPHONE ENCOUNTER
Approvedon January 11  PA Case: [id number], Status: Approved, Coverage Starts on: 1/1/2022 12:00:00 AM, Coverage Ends on: 12/31/2022 12:00:00 AM. Questions? Contact [phone]. TONE

## 2022-01-16 NOTE — Clinical Note
Diagnosis: Shortness of breath [786.05.ICD-9-CM]   Admitting Provider:: LAVON STANTON [[id number]]   Future Attending Provider: LAVON STANTON [[id number]]   Reason for IP Medical Treatment  (Clinical interventions that can only be accomplished in the IP setting? ) :: hemoptysis   Estimated Length of Stay:: 2 midnights   I certify that Inpatient services for greater than or equal to 2 midnights are medically necessary:: Yes   Plans for Post-Acute care--if anticipated (pick the single best option):: A. No post acute care anticipated at this time

## 2022-01-17 PROBLEM — R04.2 HEMOPTYSIS: Status: ACTIVE | Noted: 2022-01-01

## 2022-01-17 PROBLEM — R91.8 PULMONARY MASS: Status: ACTIVE | Noted: 2022-01-01

## 2022-01-17 PROBLEM — M54.9 BACK PAIN: Status: ACTIVE | Noted: 2022-01-01

## 2022-01-17 NOTE — ASSESSMENT & PLAN NOTE
Last 2D echo from 3/2021: LVEF 60%, LVEDD 5.5 cm  Euvolemic on examination today  Will resume home Bumex dose  Irbesartan reordered as Losartan 2/2 pharmacy formulary preferences  Will continue with low-salt/fluid restriction and strict Is/Os

## 2022-01-17 NOTE — ASSESSMENT & PLAN NOTE
Complaining of worsening back pain as well as right flank pain  UA negative and pain not associated with dysuria/hematuria   Denies loss of bladder/bowel control, weakness, numbness/tingly sensation  Will order CT abdo for further eval

## 2022-01-17 NOTE — ASSESSMENT & PLAN NOTE
Hemoptysis  Complains of coughing up clots of blood since January 7th. Reports weight loss, but denies fever, chills, or known sick contacts. She is a former smoker who quit 5 years ago with 50 pack year history. CXR was unremarkable. CT chest/abdomen/pelvis revealed multiple pulmonary nodules (up to 1.1 cm), stable in comparison to CT in 2015 as well as 3.9 x 4.8 x 7.3 cm left upper lobe mass and 1.3 cm opacity in the left lower lobe. Additionally identifying increase in size of liver lesion increased in size in comparison to CT from 2019.     Recommendations  - CT chest with contrast for further visualization   - Discussion with staff on further intervention (bronchoscopy) pending results    - Consult IR for recommendations on liver lesion

## 2022-01-17 NOTE — SUBJECTIVE & OBJECTIVE
Past Medical History:   Diagnosis Date    Asthma     COPD (chronic obstructive pulmonary disease)     Diastolic dysfunction with heart failure 2015    Occl RCA, high grade prox LAD and LCfx cath [facility] Dec 2014 2015    Pulmonary HTN     Sleep apnea     Sleep apnea        Past Surgical History:   Procedure Laterality Date    BACK SURGERY      HYSTERECTOMY      RIGHT HEART CATHETERIZATION Right 2018    Procedure: HEART CATH-RIGHT;  Surgeon: Le Salmeron MD;  Location: [facility] CATH LAB;  Service: Cardiology;  Laterality: Right;    RIGHT HEART CATHETERIZATION Right 2019    Procedure: HEART CATH-RIGHT;  Surgeon: Le Salmeron MD;  Location: [facility] CATH LAB;  Service: Cardiology;  Laterality: Right;       Review of patient's allergies indicates:   Allergen Reactions    Ace inhibitors     Penicillin     Penicillins Itching    Sulfa (sulfonamide antibiotics) Itching    Sulfadiazine        Current Facility-Administered Medications   Medication    albuterol inhaler 2 puff    allopurinoL tablet 100 mg    atorvastatin tablet 80 mg    budesonide-formoterol 160-4.5 mcg inhaler 2 puff    bumetanide tablet 1 mg    cetirizine tablet 5 mg    ferrous sulfate tablet 1 each    fluticasone propionate 50 mcg/actuation nasal spray 100 mcg    folic acid tablet 1,000 mcg    losartan tablet 50 mg    macitentan tablet 10 mg    magnesium oxide tablet 400 mg    montelukast tablet 10 mg    pantoprazole EC tablet 40 mg    riociguat (ADEMPAS) tablet 2.5 mg    selexipag Tab 1,600 mcg    sodium chloride 0.9% flush 10 mL    tiotropium bromide 2.5 mcg/actuation inhaler 2 puff     Family History     Problem Relation (Age of Onset)    Cancer Sister        Tobacco Use    Smoking status: Former Smoker     Packs/day: 0.25     Years: 15.00     Pack years: 3.75     Types: Cigarettes     Quit date: 2016     Years since quittin.2    Smokeless tobacco: Never Used   Substance and Sexual Activity     Alcohol use: No    Drug use: No    Sexual activity: Not on file     Review of Systems   Respiratory: Positive for cough.         Hemoptysis   Musculoskeletal: Positive for back pain.   All other systems reviewed and are negative.    Objective:     Vital Signs (Most Recent):  Temp: 98.5 °F (36.9 °C) (01/17/22 0145)  Pulse: 78 (01/17/22 0420)  Resp: 20 (01/17/22 0400)  BP: 125/72 (01/17/22 0400)  SpO2: (!) 92 % (01/17/22 0400) Vital Signs (24h Range):  Temp:  [98.5 °F (36.9 °C)-98.7 °F (37.1 °C)] 98.5 °F (36.9 °C)  Pulse:  [78-92] 78  Resp:  [18-22] 20  SpO2:  [92 %-97 %] 92 %  BP: (114-125)/(57-72) 125/72     Patient Vitals for the past 72 hrs (Last 3 readings):   Weight   01/17/22 0145 78.8 kg (173 lb 11.6 oz)   01/16/22 2123 82.6 kg (182 lb)     Body mass index is 28.04 kg/m².      Intake/Output Summary (Last 24 hours) at 1/17/2022 0531  Last data filed at 1/17/2022 0200  Gross per 24 hour   Intake --   Output 200 ml   Net -200 ml       Physical Exam  Constitutional:       Appearance: Normal appearance.   HENT:      Head: Normocephalic and atraumatic.      Mouth/Throat:      Mouth: Mucous membranes are dry.   Eyes:      Extraocular Movements: Extraocular movements intact.      Pupils: Pupils are equal, round, and reactive to light.   Neck:      Comments: No JVD  Cardiovascular:      Rate and Rhythm: Normal rate and regular rhythm.   Pulmonary:      Breath sounds: Wheezing present.   Abdominal:      Palpations: Abdomen is soft.   Musculoskeletal:      Cervical back: Normal range of motion.      Right lower leg: No edema.      Left lower leg: No edema.   Skin:     General: Skin is warm and dry.   Neurological:      General: No focal deficit present.      Mental Status: She is alert.   Psychiatric:         Mood and Affect: Mood normal.         Significant Labs:  CBC:  Recent Labs   Lab 01/16/22  2303   WBC 6.66   RBC 3.60*   HGB 9.5*   HCT 31.1*      MCV 86   MCH 26.4*   MCHC 30.5*     BNP:  Recent Labs   Lab  01/16/22  2303   BNP 16     CMP:  Recent Labs   Lab 01/16/22  2303   *   CALCIUM 9.3   ALBUMIN 3.1*   PROT 6.8      K 3.9   CO2 22*      BUN 26*   CREATININE 1.3   ALKPHOS 92   ALT 12   AST 20   BILITOT 0.3      Coagulation:   Recent Labs   Lab 01/16/22 2303   INR 1.0       I have reviewed all pertinent labs within the past 24 hours.    Diagnostic Results:  I have reviewed all pertinent imaging results/findings within the past 24 hours.

## 2022-01-17 NOTE — ASSESSMENT & PLAN NOTE
PAH (WHO group 1) with most recent C 7/2019 - RA: 22/ 15/ 17 RV: 90/ 10/ 20 PA: 90/ 30/ 50 PWP: 25/ 25/ 25  Will continue home therapy of Adempas 2.5 mg TID, Uptravi 1600 mcg BID, and Opsumit 10 mg daily

## 2022-01-17 NOTE — ED PROVIDER NOTES
Encounter Date: 1/16/2022       History     Chief Complaint   Patient presents with    Hemoptysis     Pt states coughing up blood starting Friday. Pt states she feels like she has acid reflux and when she begins to cough she coughs up blood. Pt states she feels short winded, wears 2 L at home for pulmonary hypertension. 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  Patient reports symptoms began 48 hours prior.  Patient reports after eating she has coughing fits.  These also occur when lying back. During the coughing fits she reports productive phlegm with spots of blood.  She reports acute on chronic shortness of breath during this time.  She denies any fevers, chest pain, weight gain.  She is also complaining of flank pain.  Patient reports pain in the right greater than left flank for the past 3 weeks.  She was seen in the emergency department for this last week at which time CT abdomen/pelvis without contrast did not reveal any clear etiology of the pain but did reveal a moderate to large pericardial effusion.  She was evaluated by cardiology at that time.  Patient has tried acetaminophen without relief of the pain.  No urinary symptoms. No trauma, heavy lifting, or increased use. Pt is suspicious symptoms are related to a herniated disc in her back.  She has been working on following up with a spine specialist but has been hesitant to come to a healthcare setting for an epidural due to the COVID-19 pandemic.  Patient reports that she has had incomplete relief with Vicodin home and no relief with Tylenol.        Review of patient's allergies indicates:   Allergen  Reactions    Ace inhibitors     Penicillin     Penicillins Itching    Sulfa (sulfonamide antibiotics) Itching    Sulfadiazine      Past Medical History:   Diagnosis Date    Asthma     COPD (chronic obstructive pulmonary disease)     Diastolic dysfunction with heart failure 2015    Occl RCA, high grade prox LAD and LCfx cath [facility] Dec 2014 2015    Pulmonary HTN     Sleep apnea     Sleep apnea      Past Surgical History:   Procedure Laterality Date    BACK SURGERY      HYSTERECTOMY      RIGHT HEART CATHETERIZATION Right 2018    Procedure: HEART CATH-RIGHT;  Surgeon: Le Salmeron MD;  Location: [facility] CATH LAB;  Service: Cardiology;  Laterality: Right;    RIGHT HEART CATHETERIZATION Right 2019    Procedure: HEART CATH-RIGHT;  Surgeon: Le Salmeron MD;  Location: [facility] CATH LAB;  Service: Cardiology;  Laterality: Right;     Family History   Problem Relation Age of Onset    Cancer Sister      Social History     Tobacco Use    Smoking status: Former Smoker     Packs/day: 0.25     Years: 15.00     Pack years: 3.75     Types: Cigarettes     Quit date: 2016     Years since quittin.2    Smokeless tobacco: Never Used   Substance Use Topics    Alcohol use: No    Drug use: No     Review of Systems   Constitutional: Positive for fatigue. Negative for fever.   HENT: Negative for sore throat.    Respiratory: Positive for cough and shortness of breath.    Cardiovascular: Negative for chest pain.   Gastrointestinal: Negative for abdominal pain, nausea and vomiting.   Genitourinary: Positive for flank pain. Negative for dysuria, frequency and hematuria.   Musculoskeletal: Negative for back pain.   Skin: Negative for rash.   Neurological: Negative for weakness.   Hematological: Does not bruise/bleed easily.       Physical Exam     Initial Vitals [22 2123]   BP Pulse Resp Temp SpO2   123/60 92 18 98.7 °F (37.1 °C) (!) 93 %      MAP       --         Physical Exam    Nursing note  and vitals reviewed.  Constitutional: She appears well-developed and well-nourished. She is not diaphoretic. No distress.   HENT:   Head: Normocephalic and atraumatic.   Eyes: Right eye exhibits no discharge. Left eye exhibits no discharge. No scleral icterus.   Neck: Neck supple. No JVD present.   Normal range of motion.  Cardiovascular: Normal rate, regular rhythm and intact distal pulses. Exam reveals no gallop and no friction rub.    Murmur heard.  Pulmonary/Chest: She has no rhonchi. She has no rales. She exhibits no tenderness.   Tachypneic on 2 L with scattered expiratory rhonchi, increased work of breathing   Abdominal: Abdomen is soft. Bowel sounds are normal. She exhibits no distension and no mass. There is no abdominal tenderness.   No CVA tenderness to percussion   No right CVA tenderness.  No left CVA tenderness. There is no rebound and no guarding.   Musculoskeletal:         General: No tenderness or edema. Normal range of motion.      Cervical back: Normal range of motion and neck supple.      Comments: No tenderness to palpation throughout thoracic or lumbar spine or over muscles in back at site of flank pain bilaterally     Neurological: She is alert and oriented to person, place, and time.   Skin: Skin is warm and dry. Capillary refill takes 2 to 3 seconds.   Psychiatric: She has a normal mood and affect.         ED Course   Procedures  Labs Reviewed   CBC W/ AUTO DIFFERENTIAL - Abnormal; Notable for the following components:       Result Value    RBC 3.60 (*)     Hemoglobin 9.5 (*)     Hematocrit 31.1 (*)     MCH 26.4 (*)     MCHC 30.5 (*)     RDW 15.3 (*)     MPV 9.0 (*)     Immature Granulocytes 1.5 (*)     Immature Grans (Abs) 0.10 (*)     Lymph # 0.9 (*)     Gran % 75.8 (*)     Lymph % 13.1 (*)     All other components within normal limits   COMPREHENSIVE METABOLIC PANEL - Abnormal; Notable for the following components:    CO2 22 (*)     Glucose 114 (*)     BUN 26 (*)     Albumin 3.1 (*)      eGFR if  48.0 (*)     eGFR if non  41.7 (*)     All other components within normal limits   TROPONIN I   B-TYPE NATRIURETIC PEPTIDE   PROTIME-INR   URINALYSIS, REFLEX TO URINE CULTURE    Narrative:     Specimen Source->Urine   URINALYSIS MICROSCOPIC    Narrative:     Specimen Source->Urine   SARS-COV-2 RDRP GENE    Narrative:     This test utilizes isothermal nucleic acid amplification   technology to detect the SARS-CoV-2 RdRp nucleic acid segment.   The analytical sensitivity (limit of detection) is 125 genome   equivalents/mL.   A POSITIVE result implies infection with the SARS-CoV-2 virus;   the patient is presumed to be contagious.     A NEGATIVE result means that SARS-CoV-2 nucleic acids are not   present above the limit of detection. A NEGATIVE result should be   treated as presumptive. It does not rule out the possibility of   COVID-19 and should not be the sole basis for treatment decisions.   If COVID-19 is strongly suspected based on clinical and exposure   history, re-testing using an alternate molecular assay should be   considered.   This test is only for use under the Food and Drug   Administration s Emergency Use Authorization (EUA).   Commercial kits are provided by [employer].   Performance characteristics of the EUA have been independently   verified by [facility] Department of   Pathology and Laboratory Medicine.   _________________________________________________________________   The authorized Fact Sheet for Healthcare Providers and the authorized Fact   Sheet for Patients of the ID NOW COVID-19 are available on the FDA   website:     https://www.fda.gov/media/504934/download  https://www.fda.gov/media/248469/download           EKG Readings: (Independently Interpreted)   Initial Reading: No STEMI. Previous EKG: Compared with most recent EKG Rhythm: Normal Sinus Rhythm. Conduction: RBBB (incomplete). ST Segments: Normal ST Segments. T Waves:  Normal. Axis: Right Axis Deviation.       Imaging Results          X-Ray Chest AP Portable (Final result)  Result time 01/16/22 23:20:04    Final result by Yehuda Joseph MD (01/16/22 23:20:04)                 Impression:      1. No significant change in cardiopulmonary status.  2. Stable enlargement of the cardiac silhouette with bibasilar effusions and probable pulmonary edema.  Left basilar retrocardiac airspace disease is not excluded.  Recommend follow-up.      Electronically signed by: Yehuda Joseph  Date:    01/16/2022  Time:    23:20             Narrative:    EXAMINATION:  XR CHEST AP PORTABLE    CLINICAL HISTORY:  CHF;    TECHNIQUE:  Single frontal view of the chest was performed.    COMPARISON:  01/08/2022    FINDINGS:  Cardiac silhouette is enlarged.  Bibasilar pleural effusions.  Aortic atherosclerosis.    Mild interstitial and ground-glass changes bilaterally could be associated with mild pulmonary edema.    Findings are similar to the prior study.    Left basilar airspace disease is not excluded.                                 Medications   HYDROcodone-acetaminophen 5-325 mg per tablet 1 tablet (1 tablet Oral Given 1/16/22 2303)     Medical Decision Making:   History:   Old Medical Records: I decided to obtain old medical records.  Initial Assessment:   70 yo W with pmhx severe pulmonary hypertension (PA pressure 70; on uptravi, opsumit 10 mg daily and adempas 2.5 tid), moderate to severe RV dysfunction, chronic hypercapnic respiratory failure, obesity, GLENN on CPAP, diabetes, COPD, CKD presents with a chief complaint of hemoptysis.   Differential Diagnosis:   UTI, muscular strains, CHF, pulmonary hypertension flare, PE, pulmonary edema, pericardial effusion, anemia, pneumonia  Clinical Tests:   Lab Tests: Ordered  Radiological Study: Ordered  Medical Tests: Ordered  ED Management:  Will administer Norco.  Will obtain labs and chest x-ray.    Reassessment:  CBC without leukocytosis.  Hemoglobin 9.5  at baseline.  UA negative for blood or infection.  COVID is negative.  Chest x-ray with stable cardiomegaly, bibasilar effusions and pulmonary edema. CMP with creatinine 1.3 improved from baseline.  Troponin normal.  BNP normal.  Patient has no history of elevated BNP however.  Patient remains satting 93% on home O2.  Cardiology was consulted for recommendations and plan to admin for further eval, echo.                        Clinical Impression:   Final diagnoses:  [R06.02] Shortness of breath  [R04.2] Hemoptysis (Primary)          ED Disposition Condition    Admit               Jose Morley MD  01/17/22 0027       Jose Morley MD  01/17/22 0028

## 2022-01-17 NOTE — CONSULTS
[facility] - Transplant Stepdown  Pulmonology  Consult Note    Patient Name: Bessie Davis  MRN: [id number]  Admission Date: 1/16/2022  Hospital Length of Stay: 0 days  Code Status: Full Code  Attending Physician: Nadir Powell MD  Primary Care Provider: Suhas Beltran MD   Principal Problem: Hemoptysis    Inpatient consult to Pulmonology  Consult performed by: Shante Meyer PA-C  Consult ordered by: Claire Valencia MD    Inpatient consult to Pulmonology  Consult performed by: Shante Meyer PA-C  Consult ordered by: MARIBEL Kulkarni        Subjective:     HPI:  Patient is a 71 y.o. female former smoker (50 pack years) with PMHx of pulmonary hypertension WHO group 1, CHF, CKD stage 3b, GLENN on CPAP presenting with chief complaint of hemoptysis. On January 7th, she noticed worsening of dry cough which she has at baseline as well as hemoptysis, described as blood clots mixed with mucus. The next day (1/08), she came to [facility] [place] for evaluation and CT abdomen/pelvis done revealed perinephric fat stranding and a moderate-large sized pleural effusion. UA done was largely unremarkable. She was discharged from the ED with outpatient follow up. She states that in the last few days she has been using her rescue inhaler much more often. She has also been wearing her home O2 (2 LC NC) around the clock vs PRN as she was doing so before. Her niece urged her to go to the hospital for re-evaluation.     She denies use of blood thinners, hemetemesis, nausea, abdominal pain, diarrhea, BRBPR, dysuria, hematuria, or fevers, chills, night sweats or recent travel history. She reports weight loss of 10 lbs over past 3 weeks as well as diffuse back/flank pain       Vital signs upon arrival were stable although pt was requiring 2 L NC. Labs were remarkable for a hemoglobin 9.5 (similar to result from 1/8 however lower than pt's prior baseline from 4805-0328), no leukocytosis, INR 1, BNP 16, creatinine 1.5 (BL). UA largely  unremarkable.       Objective:     Vital Signs (Most Recent):  Temp: 99.7 °F (37.6 °C) (01/17/22 1156)  Pulse: 87 (01/17/22 1156)  Resp: 16 (01/17/22 1156)  BP: 126/60 (01/17/22 1156)  SpO2: (!) 94 % (01/17/22 1156) Vital Signs (24h Range):  Temp:  [98.5 °F (36.9 °C)-99.7 °F (37.6 °C)] 99.7 °F (37.6 °C)  Pulse:  [78-92] 87  Resp:  [16-24] 16  SpO2:  [92 %-97 %] 94 %  BP: (112-126)/(57-72) 126/60     Weight: 78.8 kg (173 lb 11.6 oz)  Body mass index is 28.04 kg/m².      Intake/Output Summary (Last 24 hours) at 1/17/2022 1215  Last data filed at 1/17/2022 0600  Gross per 24 hour   Intake 180 ml   Output 200 ml   Net -20 ml       Physical Exam  Constitutional:       General: She is not in acute distress.     Appearance: Normal appearance. She is not toxic-appearing.   HENT:      Head: Normocephalic and atraumatic.      Mouth/Throat:      Mouth: Mucous membranes are moist.      Pharynx: Oropharynx is clear.   Eyes:      Extraocular Movements: Extraocular movements intact.      Pupils: Pupils are equal, round, and reactive to light.   Cardiovascular:      Rate and Rhythm: Normal rate and regular rhythm.      Heart sounds: Normal heart sounds. No murmur heard.  No gallop.    Pulmonary:      Effort: Pulmonary effort is normal. No respiratory distress.      Breath sounds: Normal breath sounds. No wheezing.   Abdominal:      General: Abdomen is flat. There is no distension.      Palpations: Abdomen is soft.      Tenderness: There is no abdominal tenderness.   Skin:     General: Skin is warm and dry.   Neurological:      Mental Status: She is alert and oriented to person, place, and time. Mental status is at baseline.   Psychiatric:         Mood and Affect: Mood normal.         Behavior: Behavior normal.         Thought Content: Thought content normal.         Judgment: Judgment normal.       Lines/Drains/Airways     Peripheral Intravenous Line                 Peripheral IV - Single Lumen 01/16/22 2305 Anterior;Proximal;Right  Forearm <1 day              Significant Labs:    CBC/Anemia Profile:  Recent Labs   Lab 01/16/22  2303   WBC 6.66   HGB 9.5*   HCT 31.1*      MCV 86   RDW 15.3*      Chemistries:  Recent Labs   Lab 01/16/22  2303 01/17/22  0710    139   K 3.9 3.8    104   CO2 22* 24   BUN 26* 24*   CREATININE 1.3 1.2   CALCIUM 9.3 9.2   ALBUMIN 3.1*  --    PROT 6.8  --    BILITOT 0.3  --    ALKPHOS 92  --    ALT 12  --    AST 20  --    MG  --  1.8     Urine Studies:   Recent Labs   Lab 01/16/22  2246   COLORU Straw   APPEARANCEUA Clear   PHUR 5.0   SPECGRAV 1.010   PROTEINUA Negative   GLUCUA Negative   KETONESU Negative   BILIRUBINUA Negative   OCCULTUA Negative   NITRITE Negative   LEUKOCYTESUR Negative   RBCUA 2   WBCUA 0   SQUAMEPITHEL 2   HYALINECASTS 1     All pertinent labs within the past 24 hours have been reviewed.    Significant Imaging:    CT Chest/Abdomen/Pelvis: Left upper lobe mass measuring up to 7.3 cm concerning for malignancy, extending toward the left hilum and narrows the left upper lobe/lingular bronchi. Ill-defined hypoattenuating masses in the liver, as described above, increased in size from CT 11/30/2019 and concerning for metastatic disease.     CXR: Stable enlargement of the cardiac silhouette with bibasilar effusions and probable pulmonary edema without significant change in cardiopulmonary status    Echo: pending     I have reviewed all pertinent imaging results/findings within the past 24 hours.    Assessment/Plan:     * Hemoptysis  See pulmonary mass    Pulmonary mass  Hemoptysis  Complains of coughing up clots of blood since January 7th. Reports weight loss, but denies fever, chills, or known sick contacts. She is a former smoker who quit 5 years ago with 50 pack year history. CXR was unremarkable. CT chest/abdomen/pelvis revealed multiple pulmonary nodules (up to 1.1 cm), stable in comparison to CT in 2015 as well as 3.9 x 4.8 x 7.3 cm left upper lobe mass and 1.3 cm opacity in  the left lower lobe. Additionally identifying increase in size of liver lesion increased in size in comparison to CT from 2019.     Recommendations  - CT chest with contrast for further visualization   - Discussion with staff on further intervention (bronchoscopy) pending results    - Consult IR for recommendations on liver lesion    Sleep apnea  Patient reports history CPAP use at home    Recommendations  - Order CPAP     Thank you for your consult. I will follow-up with patient. Please contact us if you have any additional questions.     Shante Meyer PA-C  Pulmonology  [facility] - Transplant Stepdown

## 2022-01-17 NOTE — H&P
[facility] - Transplant Stepdown  Heart Transplant  H&P    Patient Name: Bessie Davis  MRN: [id number]  Admission Date: 1/16/2022  Attending Physician: Nadir Powell MD  Primary Care Provider: Suhas Beltran MD  Principal Problem:Hemoptysis    Subjective:     History of Present Illness:  Patient is a 71 y.o. 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    Patient states that since January 7th she has been experiencing a worsening of her baseline dry cough. Additionally, she has been consistently coughing up clots of blood. She is not on any blood thinners and denies any hemetemesis, nausea, abdominal pain, diarrhea, BRBPR or hematochezia. No chest pain or worsening shortness of breath compared to her baseline. No fevers, chills, weight loss, or night sweats and no recent travel history.  Associated systemic symptoms include diffuse back/flank pain. No complaints of dysuria or hematuria. She has been vaccinated and boosted against COVID-19. On January 8th patient came to [facility] for evaluation of this pain and a CT abdomen/pelvis done revealed perinephric fat stranding and a moderate-large sized pleural effusion. UA done during that visit was largely unremarkable. She was discharged from the ED with outpatient follow up.     Patient states that in the last few days she has been using her rescue inhaler much more often. She has also been wearing her home O2 around the clock vs PRN as she was doing so before. Her niece urged her to go to the hospital for re-evaluation.     Vital signs upon arrival were stable although pt was requiring 2 L NC. Labs were remarkable for a hemoglobin 9.5 (similar to result from 1/8 however lower than pt's prior baseline from 5720-0756), no  leukocytosis, INR 1, BNP 16, creatinine 1.5 (BL). UA largely unremarkable.       Past Medical History:   Diagnosis Date    Asthma     COPD (chronic obstructive pulmonary disease)     Diastolic dysfunction with heart failure 5/20/2015    Occl RCA, high grade prox LAD and LCfx cath [facility] Dec 2014 5/11/2015    Pulmonary HTN     Sleep apnea     Sleep apnea        Past Surgical History:   Procedure Laterality Date    BACK SURGERY      HYSTERECTOMY      RIGHT HEART CATHETERIZATION Right 9/17/2018    Procedure: HEART CATH-RIGHT;  Surgeon: Le Salmeron MD;  Location: [facility] CATH LAB;  Service: Cardiology;  Laterality: Right;    RIGHT HEART CATHETERIZATION Right 7/8/2019    Procedure: HEART CATH-RIGHT;  Surgeon: Le Salmeron MD;  Location: [facility] CATH LAB;  Service: Cardiology;  Laterality: Right;       Review of patient's allergies indicates:   Allergen Reactions    Ace inhibitors     Penicillin     Penicillins Itching    Sulfa (sulfonamide antibiotics) Itching    Sulfadiazine        Current Facility-Administered Medications   Medication    albuterol inhaler 2 puff    allopurinoL tablet 100 mg    atorvastatin tablet 80 mg    budesonide-formoterol 160-4.5 mcg inhaler 2 puff    bumetanide tablet 1 mg    cetirizine tablet 5 mg    ferrous sulfate tablet 1 each    fluticasone propionate 50 mcg/actuation nasal spray 100 mcg    folic acid tablet 1,000 mcg    losartan tablet 50 mg    macitentan tablet 10 mg    magnesium oxide tablet 400 mg    montelukast tablet 10 mg    pantoprazole EC tablet 40 mg    riociguat (ADEMPAS) tablet 2.5 mg    selexipag Tab 1,600 mcg    sodium chloride 0.9% flush 10 mL    tiotropium bromide 2.5 mcg/actuation inhaler 2 puff     Family History     Problem Relation (Age of Onset)    Cancer Sister        Tobacco Use    Smoking status: Former Smoker     Packs/day: 0.25     Years: 15.00     Pack years: 3.75     Types: Cigarettes     Quit date: 11/2016     Years since  quittin.2    Smokeless tobacco: Never Used   Substance and Sexual Activity    Alcohol use: No    Drug use: No    Sexual activity: Not on file     Review of Systems   Respiratory: Positive for cough.         Hemoptysis   Musculoskeletal: Positive for back pain.   All other systems reviewed and are negative.    Objective:     Vital Signs (Most Recent):  Temp: 98.5 °F (36.9 °C) (22 0145)  Pulse: 78 (22 0420)  Resp: 20 (22 0400)  BP: 125/72 (22 0400)  SpO2: (!) 92 % (22 0400) Vital Signs (24h Range):  Temp:  [98.5 °F (36.9 °C)-98.7 °F (37.1 °C)] 98.5 °F (36.9 °C)  Pulse:  [78-92] 78  Resp:  [18-22] 20  SpO2:  [92 %-97 %] 92 %  BP: (114-125)/(57-72) 125/72     Patient Vitals for the past 72 hrs (Last 3 readings):   Weight   22 014 78.8 kg (173 lb 11.6 oz)   223 82.6 kg (182 lb)     Body mass index is 28.04 kg/m².      Intake/Output Summary (Last 24 hours) at 2022 0531  Last data filed at 2022 0200  Gross per 24 hour   Intake --   Output 200 ml   Net -200 ml       Physical Exam  Constitutional:       Appearance: Normal appearance.   HENT:      Head: Normocephalic and atraumatic.      Mouth/Throat:      Mouth: Mucous membranes are dry.   Eyes:      Extraocular Movements: Extraocular movements intact.      Pupils: Pupils are equal, round, and reactive to light.   Neck:      Comments: No JVD  Cardiovascular:      Rate and Rhythm: Normal rate and regular rhythm.   Pulmonary:      Breath sounds: Wheezing present.   Abdominal:      Palpations: Abdomen is soft.   Musculoskeletal:      Cervical back: Normal range of motion.      Right lower leg: No edema.      Left lower leg: No edema.   Skin:     General: Skin is warm and dry.   Neurological:      General: No focal deficit present.      Mental Status: She is alert.   Psychiatric:         Mood and Affect: Mood normal.         Significant Labs:  CBC:  Recent Labs   Lab 22  2303   WBC 6.66   RBC 3.60*   HGB 9.5*    HCT 31.1*      MCV 86   MCH 26.4*   MCHC 30.5*     BNP:  Recent Labs   Lab 01/16/22 2303   BNP 16     CMP:  Recent Labs   Lab 01/16/22 2303   *   CALCIUM 9.3   ALBUMIN 3.1*   PROT 6.8      K 3.9   CO2 22*      BUN 26*   CREATININE 1.3   ALKPHOS 92   ALT 12   AST 20   BILITOT 0.3      Coagulation:   Recent Labs   Lab 01/16/22 2303   INR 1.0       I have reviewed all pertinent labs within the past 24 hours.    Diagnostic Results:  I have reviewed all pertinent imaging results/findings within the past 24 hours.    Assessment/Plan:     * Hemoptysis  Complaining of coughing up clots of blood since January 7th  No fevers, chills, chest pain, hematemesis, abdominal pain, significant weight loss, or travel history  Denies worsening SOB from baseline or LE edema   Using home rescue inhaler approx 4-6x daily in the last few days  Long-standing history of cigarette usage--50+pack years; quit 5 years ago  BNP 16 and sputum does not appear pink/frothy characteristic of heart failure exacerbations   CXR obtained on admission negative for acute changes    Plan:  Will order CT chest without contrast STAT to eval further -- possibly 2/2 malignancy given smoking history? Doubt pneumonia/TB as patient not experiencing any fevers, chest pain, SOB, chills, night sweats--no international travel  TTE ordered for reevaluation of moderate-large pericardial effusion noted on CT abdomen from 1/8--if pericardial effusion is large enough to be tapped, will consult IR/pulmonary        Back pain  Complaining of worsening back pain as well as right flank pain  UA negative and pain not associated with dysuria/hematuria   Denies loss of bladder/bowel control, weakness, numbness/tingly sensation  Will order CT abdo for further eval     Chronic diastolic heart failure  Last 2D echo from 3/2021: LVEF 60%, LVEDD 5.5 cm  Euvolemic on examination today  Will resume home Bumex dose  Irbesartan reordered as Losartan 2/2  pharmacy formulary preferences  Will continue with low-salt/fluid restriction and strict Is/Os    Pulmonary hypertension  PAH (WHO group 1) with most recent [facility] 7/2019 - RA: 22/ 15/ 17 RV: 90/ 10/ 20 PA: 90/ 30/ 50 PWP: 25/ 25/ 25  Will continue home therapy of Adempas 2.5 mg TID, Uptravi 1600 mcg BID, and Opsumit 10 mg daily    COPD (chronic obstructive pulmonary disease)  Long-standing smoking history--smoked approx 1 pack daily for over 50 years; quit 5 years ago  On 2 L home O2 PRN however requiring around the clock oxygen in the last few days  Will reorder home Albuterol PRN, Symbicort BID and Spiriva         Claire Valencia MD  Heart Transplant  [facility] - Trans[facility] Stepdown

## 2022-01-17 NOTE — HPI
Patient is a 71 y.o. female former smoker (50 pack years) with PMHx of pulmonary hypertension WHO group 1, CHF, CKD stage 3b, GLENN on CPAP presenting with chief complaint of hemoptysis. On January 7th, she noticed worsening of dry cough which she has at baseline as well as hemoptysis, described as blood clots mixed with mucus. The next day (1/08), she came to [facility] and CT abdomen/pelvis done revealed perinephric fat stranding and a moderate-large sized pericardial effusion. UA done was largely unremarkable. She was discharged from the ED with outpatient follow up. She states that in the last few days she has been using her rescue inhaler much more often. She has also been wearing her home O2 (2 LC NC) around the clock vs PRN as she was doing so before. Her niece urged her to go to the hospital for re-evaluation.     She denies use of blood thinners, hemetemesis, nausea, abdominal pain, diarrhea, BRBPR, dysuria, hematuria, or fevers, chills, night sweats or recent travel history. She reports weight loss of 10 lbs over past 3 weeks as well as diffuse back/flank pain       Vital signs upon arrival were stable although pt was requiring 2 L NC. Labs were remarkable for a hemoglobin 9.5 (similar to result from 1/8 however lower than pt's prior baseline from 5437-5992), no leukocytosis, INR 1, BNP 16, creatinine 1.5 (BL). UA largely unremarkable.

## 2022-01-17 NOTE — ASSESSMENT & PLAN NOTE
Complaining of coughing up clots of blood since January 7th  No fevers, chills, chest pain, hematemesis, abdominal pain, significant weight loss, or travel history  Denies worsening SOB from baseline or LE edema   Using home rescue inhaler approx 4-6x daily in the last few days  Long-standing history of cigarette usage--50+pack years; quit 5 years ago  BNP 16 and sputum does not appear pink/frothy characteristic of heart failure exacerbations   CXR obtained on admission negative for acute changes    Plan:  Will order CT chest without contrast STAT to eval further -- possibly 2/2 malignancy given smoking history? Doubt pneumonia/TB as patient not experiencing any fevers, chest pain, SOB, chills, night sweats--no international travel  TTE ordered for reevaluation of moderate-large pericardial effusion noted on CT abdomen from 1/8--if pericardial effusion is large enough to be tapped, will consult IR/pulmonary

## 2022-01-17 NOTE — PLAN OF CARE
Pt arrived to [facility] via stretcher with pt escort. VSS, afebrile, SpO2>92% on 2L NC. Telemetry monitoring SR. CT chest al pel done. Echo ordered. Pt up indepedent to bedside commode. Fall precautions maintained. See flowsheet for assessment findings.

## 2022-01-17 NOTE — SUBJECTIVE & OBJECTIVE
Objective:     Vital Signs (Most Recent):  Temp: 99.7 °F (37.6 °C) (01/17/22 1156)  Pulse: 87 (01/17/22 1156)  Resp: 16 (01/17/22 1156)  BP: 126/60 (01/17/22 1156)  SpO2: (!) 94 % (01/17/22 1156) Vital Signs (24h Range):  Temp:  [98.5 °F (36.9 °C)-99.7 °F (37.6 °C)] 99.7 °F (37.6 °C)  Pulse:  [78-92] 87  Resp:  [16-24] 16  SpO2:  [92 %-97 %] 94 %  BP: (112-126)/(57-72) 126/60     Weight: 78.8 kg (173 lb 11.6 oz)  Body mass index is 28.04 kg/m².      Intake/Output Summary (Last 24 hours) at 1/17/2022 1215  Last data filed at 1/17/2022 0600  Gross per 24 hour   Intake 180 ml   Output 200 ml   Net -20 ml       Physical Exam  Constitutional:       General: She is not in acute distress.     Appearance: Normal appearance. She is not toxic-appearing.   HENT:      Head: Normocephalic and atraumatic.      Mouth/Throat:      Mouth: Mucous membranes are moist.      Pharynx: Oropharynx is clear.   Eyes:      Extraocular Movements: Extraocular movements intact.      Pupils: Pupils are equal, round, and reactive to light.   Cardiovascular:      Rate and Rhythm: Normal rate and regular rhythm.      Heart sounds: Normal heart sounds. No murmur heard.  No gallop.    Pulmonary:      Effort: Pulmonary effort is normal. No respiratory distress.      Breath sounds: Normal breath sounds. No wheezing.   Abdominal:      General: Abdomen is flat. There is no distension.      Palpations: Abdomen is soft.      Tenderness: There is no abdominal tenderness.   Skin:     General: Skin is warm and dry.   Neurological:      Mental Status: She is alert and oriented to person, place, and time. Mental status is at baseline.   Psychiatric:         Mood and Affect: Mood normal.         Behavior: Behavior normal.         Thought Content: Thought content normal.         Judgment: Judgment normal.       Lines/Drains/Airways     Peripheral Intravenous Line                 Peripheral IV - Single Lumen 01/16/22 2305 Anterior;Proximal;Right Forearm <1 day               Significant Labs:    CBC/Anemia Profile:  Recent Labs   Lab 01/16/22  2303   WBC 6.66   HGB 9.5*   HCT 31.1*      MCV 86   RDW 15.3*      Chemistries:  Recent Labs   Lab 01/16/22  2303 01/17/22  0710    139   K 3.9 3.8    104   CO2 22* 24   BUN 26* 24*   CREATININE 1.3 1.2   CALCIUM 9.3 9.2   ALBUMIN 3.1*  --    PROT 6.8  --    BILITOT 0.3  --    ALKPHOS 92  --    ALT 12  --    AST 20  --    MG  --  1.8     Urine Studies:   Recent Labs   Lab 01/16/22  2246   COLORU Straw   APPEARANCEUA Clear   PHUR 5.0   SPECGRAV 1.010   PROTEINUA Negative   GLUCUA Negative   KETONESU Negative   BILIRUBINUA Negative   OCCULTUA Negative   NITRITE Negative   LEUKOCYTESUR Negative   RBCUA 2   WBCUA 0   SQUAMEPITHEL 2   HYALINECASTS 1     All pertinent labs within the past 24 hours have been reviewed.    Significant Imaging:    CT Chest/Abdomen/Pelvis: Left upper lobe mass measuring up to 7.3 cm concerning for malignancy, extending toward the left hilum and narrows the left upper lobe/lingular bronchi. Ill-defined hypoattenuating masses in the liver, as described above, increased in size from CT 11/30/2019 and concerning for metastatic disease.     CXR: Stable enlargement of the cardiac silhouette with bibasilar effusions and probable pulmonary edema without significant change in cardiopulmonary status    Echo: pending     I have reviewed all pertinent imaging results/findings within the past 24 hours.

## 2022-01-17 NOTE — ED TRIAGE NOTES
Bessie Davis, a 71 y.o. female presents to the ED c/o hemoptysis since friday. States she has been coughing up blood after any PO intake. Pt showed sample, appears to be blood tinged sputum. Pt has hx pulm hypertension, on 2L NC chronically.       Chief Complaint   Patient presents with    Hemoptysis     Pt states coughing up blood starting Friday. Pt states she feels like she has acid reflux and when she begins to cough she coughs up blood. Pt states she feels short winded, wears 2 L at home for pulmonary hypertension. Also complaining of R flank pain, seen here last week for same issue and dx with sciatica, pain not improving     Review of patient's allergies indicates:   Allergen Reactions    Ace inhibitors     Penicillin     Penicillins Itching    Sulfa (sulfonamide antibiotics) Itching    Sulfadiazine      Past Medical History:   Diagnosis Date    Asthma     COPD (chronic obstructive pulmonary disease)     Diastolic dysfunction with heart failure 5/20/2015    Occl RCA, high grade prox LAD and LCfx cath [facility] Dec 2014 5/11/2015    Pulmonary HTN     Sleep apnea     Sleep apnea        LOC: Patient name and date of birth verified. The patient is awake, alert and aware of environment with an appropriate affect, the patient is oriented x 3 and speaking appropriately.   APPEARANCE: Patient resting comfortably, patient is clean and well groomed, patient's clothing is properly fastened.  SKIN: The skin is warm and dry, color consistent with ethnicity, patient has normal skin turgor and moist mucus membranes, skin intact, no breakdown or bruising noted.  MUSCULOSKELETAL: Patient moving all extremities well, no obvious swelling or deformities noted.   RESPIRATORY: Respirations are spontaneous, patient has a normal effort and rate, no accessory muscle use noted.  CARDIAC: Patient has a normal rate and rhythm, no periphreal edema noted, capillary refill < 3 seconds.  ABDOMEN: Soft and non tender to  palpation, no distention noted. Bowel sounds present in all four quadrants.  NEUROLOGIC: Eyes open spontaneously, behavior appropriate to situation, follows commands

## 2022-01-17 NOTE — HPI
Patient is a 71 y.o. 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    Patient states that since January 7th she has been experiencing a worsening of her baseline dry cough. Additionally, she has been consistently coughing up clots of blood. She is not on any blood thinners and denies any hemetemesis, nausea, abdominal pain, diarrhea, BRBPR or hematochezia. No chest pain or worsening shortness of breath compared to her baseline. No fevers, chills, weight loss, or night sweats and no recent travel history.  Associated systemic symptoms include diffuse back/flank pain. No complaints of dysuria or hematuria. She has been vaccinated and boosted against COVID-19. On January 8th patient [place] [facility] [place] and a CT abdomen/pelvis done revealed perinephric fat stranding and a moderate-large sized pleural effusion. UA done during that visit was largely unremarkable. She was discharged from the ED with outpatient follow up.     Patient states that in the last few days she has been using her rescue inhaler much more often. She has also been wearing her home O2 around the clock vs PRN as she was doing so before. Her niece urged her to go to the hospital for re-evaluation.     Vital signs upon arrival were stable although pt was requiring 2 L NC. Labs were remarkable for a hemoglobin 9.5 (similar to result from 1/8 however lower than pt's prior baseline from 7971-5793), no leukocytosis, INR 1, BNP 16, creatinine 1.5 (BL). UA largely unremarkable.

## 2022-01-17 NOTE — PLAN OF CARE
Pt aao x3. Vss. No acute distress. Tele reveals NSR. Results of CT scan reviewed with pt per doctors. Pulmonology consulted. Pulmonology ordered a CT of the chest with contrast to assess pulmonary arteries. Pt has lesions to her lungs and liver. Pt steady on her feet. Bedside commode in place. Pt's home selexipag given late. See assessment for full chart details. Will continue to monitor, assess and adjust care as needed.

## 2022-01-17 NOTE — ASSESSMENT & PLAN NOTE
Long-standing smoking history--smoked approx 1 pack daily for over 50 years; quit 5 years ago  On 2 L home O2 PRN however requiring around the clock oxygen in the last few days  Will reorder home Albuterol PRN, Symbicort BID and Spiriva

## 2022-01-18 NOTE — PROGRESS NOTES
Pharmacokinetic Initial Assessment: IV Vancomycin    Assessment/Plan:    Initiate intravenous vancomycin with loading dose of 1500 mg once followed by a maintenance dose of vancomycin 1250mg IV every 24 hours  Desired empiric serum trough concentration is 15 to 20 mcg/mL  Draw vancomycin trough level 60 min prior to third dose on 1/20 at approximately 1730  Pharmacy will continue to follow and monitor vancomycin.      Please contact pharmacy at extension [phone] with any questions regarding this assessment.     Thank you for the consult,   Ugo Zapata       Patient brief summary:  Bessie Davis is a 71 y.o. female initiated on antimicrobial therapy with IV Vancomycin for treatment of suspected bacteremia per consult    Drug Allergies:   Review of patient's allergies indicates:   Allergen Reactions    Ace inhibitors     Penicillin     Penicillins Itching    Sulfa (sulfonamide antibiotics) Itching    Sulfadiazine        Actual Body Weight:   78.8 kg    Renal Function:   Estimated Creatinine Clearance: 46.2 mL/min (based on SCr of 1.2 mg/dL).,       CBC (last 72 hours):  Recent Labs   Lab Result Units 01/16/22  2303   WBC K/uL 6.66   Hemoglobin g/dL 9.5*   Hematocrit % 31.1*   Platelets K/uL 298   Gran % % 75.8*   Lymph % % 13.1*   Mono % % 7.5   Eosinophil % % 1.5   Basophil % % 0.6   Differential Method  Automated       Metabolic Panel (last 72 hours):  Recent Labs   Lab Result Units 01/16/22  2246 01/16/22  2303 01/17/22  0710   Sodium mmol/L  --  137 139   Potassium mmol/L  --  3.9 3.8   Chloride mmol/L  --  103 104   CO2 mmol/L  --  22* 24   Glucose mg/dL  --  114* 87   Glucose, UA  Negative  --   --    BUN mg/dL  --  26* 24*   Creatinine mg/dL  --  1.3 1.2   Albumin g/dL  --  3.1*  --    Total Bilirubin mg/dL  --  0.3  --    Alkaline Phosphatase U/L  --  92  --    AST U/L  --  20  --    ALT U/L  --  12  --    Magnesium mg/dL  --   --  1.8

## 2022-01-18 NOTE — ASSESSMENT & PLAN NOTE
-Complaining of worsening back pain as well as right flank pain  -UA negative and pain not associated with dysuria/hematuria   -Denies loss of bladder/bowel control, weakness, numbness/tingly sensation
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
## 2022-01-18 NOTE — PLAN OF CARE
Received call from Michelle Nicholas with primary team. She discussed case with IR, they are willing to perform liver biopsy but will be unable to fit patient into schedule this week. Primary team asking if okay to send patient home tomorrow AM. From a pulmonary perspective, patient is safe for discharge home at this time. She will need outpatient follow up in pulmonary clinic in 2-4 weeks, preferably after IR biopsy is completed. We will hopefully have path results back at that time and can have a better idea of next steps in medical management.    Tulio Fairbanks MD  Pulmonary / Critical Care, PGY-5

## 2022-01-18 NOTE — PLAN OF CARE
· Patient is AAOx4.  · 2-3 L NC  · Patient is up to BSC with standby assist.   · Telemetry monitoring is in place.   · IV and PO abx d/c'd.  · Patient's niece is at bedside.   · Echo and CT scan performed per order.   · Bed is in lowest position with wheels locked.   · Nonskid socks when oob.   · Instructed to call for assistance.   · WCTM

## 2022-01-18 NOTE — ASSESSMENT & PLAN NOTE
-Long-standing smoking history--smoked approx 1 pack daily for over 50 years; quit 5 years ago  -On 2 L home O2 PRN however requiring around the clock oxygen in the last few days  -Will reorder home Albuterol PRN, Symbicort BID and Spiriva

## 2022-01-18 NOTE — PROGRESS NOTES
Therapy with vancomycin and consult discontinued by provider.  Pharmacy will sign off, please re-consult as needed.    Ugo Zapata, PharmD  Ext: [phone]

## 2022-01-18 NOTE — SUBJECTIVE & OBJECTIVE
Interval History: patient reports no new complaints this morning.  Echo ordered and pending.  Discussed liver biopsy with interventional radiology and they are recommending it be done as an outpatient.  They do not have room on the schedule the next couple of days.  Discussed that with pulmonology and they are ok with that.  Will just need follow up as an outpatient after liver biopsy. Had a temp of 100.1 yesterday day and was started on Vanc and Levaquin.  Blood cultures negative and no signs of infection so will stop abx.  If she remains stable off abx today then will plan for discharge tomorrow with IR follow up for liver biopsy and pulm follow up after biopsy done.     Continuous Infusions:  Scheduled Meds:   albuterol  2 puff Inhalation QID    allopurinoL  100 mg Oral Daily    atorvastatin  80 mg Oral QHS    budesonide-formoterol 160-4.5 mcg  2 puff Inhalation Q12H    bumetanide  1 mg Oral Daily    cetirizine  5 mg Oral Daily    ferrous sulfate  1 tablet Oral Daily    fluticasone propionate  2 spray Each Nostril Daily    folic acid  1,000 mcg Oral Daily    losartan  50 mg Oral Daily    macitentan  10 mg Oral Daily    magnesium oxide  400 mg Oral Daily    montelukast  10 mg Oral Daily    pantoprazole  40 mg Oral Daily    riociguat  2.5 mg Oral TID    selexipag  1,600 mcg Oral BID    tiotropium bromide  2 puff Inhalation Daily     PRN Meds:acetaminophen, sodium chloride 0.9%, traMADoL    Review of patient's allergies indicates:   Allergen Reactions    Ace inhibitors     Penicillin     Penicillins Itching    Sulfa (sulfonamide antibiotics) Itching    Sulfadiazine      Objective:     Vital Signs (Most Recent):  Temp: 98.4 °F (36.9 °C) (01/18/22 1138)  Pulse: 88 (01/18/22 1220)  Resp: 18 (01/18/22 1220)  BP: 123/71 (01/18/22 1105)  SpO2: 95 % (01/18/22 1220) Vital Signs (24h Range):  Temp:  [98.1 °F (36.7 °C)-100.1 °F (37.8 °C)] 98.4 °F (36.9 °C)  Pulse:  [79-94] 88  Resp:  [16-26] 18  SpO2:   [72 %-99 %] 95 %  BP: ()/(55-71) 123/71     Patient Vitals for the past 72 hrs (Last 3 readings):   Weight   01/18/22 0400 85.8 kg (189 lb 2.5 oz)   01/17/22 0145 78.8 kg (173 lb 11.6 oz)   01/16/22 2123 82.6 kg (182 lb)     Body mass index is 30.53 kg/m².      Intake/Output Summary (Last 24 hours) at 1/18/2022 1235  Last data filed at 1/18/2022 0300  Gross per 24 hour   Intake 480 ml   Output 1200 ml   Net -720 ml       Hemodynamic Parameters:       Telemetry: reviewed  Physical Exam  Constitutional:       Appearance: Normal appearance.   HENT:      Head: Normocephalic and atraumatic.      Mouth/Throat:      Mouth: Mucous membranes are dry.   Eyes:      Extraocular Movements: Extraocular movements intact.      Pupils: Pupils are equal, round, and reactive to light.   Neck:      Comments: No JVD  Cardiovascular:      Rate and Rhythm: Normal rate and regular rhythm.   Pulmonary:      Breath sounds: Wheezing present.   Abdominal:      Palpations: Abdomen is soft.   Musculoskeletal:      Cervical back: Normal range of motion.      Right lower leg: No edema.      Left lower leg: No edema.   Skin:     General: Skin is warm and dry.   Neurological:      General: No focal deficit present.      Mental Status: She is alert.   Psychiatric:         Mood and Affect: Mood normal.    Significant Labs:  CBC:  Recent Labs   Lab 01/16/22 2303 01/18/22  0701   WBC 6.66 5.88   RBC 3.60* 3.66*   HGB 9.5* 9.5*   HCT 31.1* 32.5*    297   MCV 86 89   MCH 26.4* 26.0*   MCHC 30.5* 29.2*     BNP:  Recent Labs   Lab 01/16/22 2303   BNP 16     CMP:  Recent Labs   Lab 01/16/22 2303 01/17/22  0710 01/18/22  0701   * 87 94   CALCIUM 9.3 9.2 9.2   ALBUMIN 3.1*  --   --    PROT 6.8  --   --     139 136   K 3.9 3.8 4.2   CO2 22* 24 24    104 103   BUN 26* 24* 19   CREATININE 1.3 1.2 1.4   ALKPHOS 92  --   --    ALT 12  --   --    AST 20  --   --    BILITOT 0.3  --   --       Coagulation:   Recent Labs   Lab  01/16/22  2303   INR 1.0     LDH:  No results for input(s): LDH in the last 72 hours.  Microbiology:  Microbiology Results (last 7 days)     Procedure Component Value Units Date/Time    Blood culture [[id number]] Collected: 01/17/22 1938    Order Status: Completed Specimen: Blood from Peripheral, Left Hand Updated: 01/18/22 0315     Blood Culture, Routine No Growth to date    Blood culture [[id number]] Collected: 01/17/22 1938    Order Status: Completed Specimen: Blood from Peripheral, Right Arm Updated: 01/18/22 0315     Blood Culture, Routine No Growth to date    Culture, Respiratory with Gram Stain [[id number]]     Order Status: No result Specimen: Respiratory           I have reviewed all pertinent labs within the past 24 hours.    Estimated Creatinine Clearance: 41.3 mL/min (based on SCr of 1.4 mg/dL).    Diagnostic Results:  I have reviewed all pertinent imaging results/findings within the past 24 hours.

## 2022-01-18 NOTE — ASSESSMENT & PLAN NOTE
-PAH (WHO group 1) with most recent RHC 7/2019 - RA: 22/ 15/ 17 RV: 90/ 10/ 20 PA: 90/ 30/ 50 PWP: 25/ 25/ 25  -Will continue home therapy of Adempas 2.5 mg TID, Uptravi 1600 mcg BID, and Opsumit 10 mg daily  -Echo ordered and pending.  Pericardial effusion noted on CT scan

## 2022-01-18 NOTE — PLAN OF CARE
"VSS  3L NC, sats>92%  Tachypnic, NADN  NSR   PIVX1  Blood Cx drawn and IV abx initiated after  Pt refused to wear CPAP  Intermittent confusion, pt needing to be reoriented during the night  Pt kept thinking she was at her house, at one point asking me to go and get her CPAP machine, I asked her where it was and she said "in my car"- I asked her where she thought she was and she said "at my house". Pt able to be reoriented but this happened repeatedly through the night.  Pt also repeatedly asked if she was going to die and why was she here at the hospital.  Pt oriented to self and year, and answered correctly when asked who the current president is.  Again VSS remained stable and unchanged throughout the night.  Pt became anxious when it was time to go for her CT scan, no PRNs ordered and with her waxing waning mental status I felt it would be safer to send her for CT during the day.  WCTM        "

## 2022-01-18 NOTE — SIGNIFICANT EVENT
Febrile this afternoon. Left lung with upper lobe mass concerning for malignancy. WBC normal today. UA yesterday wnl.   - Sputum & blood cultures now  - Once cultures collected would start vancomycin and Levaquin for respiratory coverage  - Fever treatment with Tylenol    Jeff Scott MD  Cardiology  PGY-6  Pager: [phone]

## 2022-01-18 NOTE — ASSESSMENT & PLAN NOTE
-Last 2D echo from 3/2021: LVEF 60%, LVEDD 5.5 cm.  Repeat echo ordered for today   -Euvolemic on examination today  -continued home dose of Bumex   -Irbesartan reordered as Losartan 2/2 pharmacy formulary preferences  -Will continue with low-salt/fluid restriction and strict Is/Os

## 2022-01-18 NOTE — ASSESSMENT & PLAN NOTE
-Lung mass found on CT with liver hypo attenuation concerning for Mets.  -Pulm consulted  -CT of chest with contrast to be done today  -Liver biopsy to be done as an outpatient with pulm follow up after biopsy.   -Long-standing history of cigarette usage--50+pack years; quit 5 years ago

## 2022-01-18 NOTE — PROGRESS NOTES
[facility] - Transplant Stepdown  Heart Transplant  Progress Note    Patient Name: Bessie Davis  MRN: [id number]  Admission Date: 1/16/2022  Hospital Length of Stay: 1 days  Attending Physician: Efrain Solorio Jr.,*  Primary Care Provider: Suhas Beltran MD  Principal Problem:Hemoptysis    Subjective:     Interval History: patient reports no new complaints this morning.  Echo ordered and pending.  Discussed liver biopsy with interventional radiology and they are recommending it be done as an outpatient.  They do not have room on the schedule the next couple of days.  Discussed that with pulmonology and they are ok with that.  Will just need follow up as an outpatient after liver biopsy. Had a temp of 100.1 yesterday day and was started on Vanc and Levaquin.  Blood cultures negative and no signs of infection so will stop abx.  If she remains stable off abx today then will plan for discharge tomorrow with IR follow up for liver biopsy and pulm follow up after biopsy done.     Continuous Infusions:  Scheduled Meds:   albuterol  2 puff Inhalation QID    allopurinoL  100 mg Oral Daily    atorvastatin  80 mg Oral QHS    budesonide-formoterol 160-4.5 mcg  2 puff Inhalation Q12H    bumetanide  1 mg Oral Daily    cetirizine  5 mg Oral Daily    ferrous sulfate  1 tablet Oral Daily    fluticasone propionate  2 spray Each Nostril Daily    folic acid  1,000 mcg Oral Daily    losartan  50 mg Oral Daily    macitentan  10 mg Oral Daily    magnesium oxide  400 mg Oral Daily    montelukast  10 mg Oral Daily    pantoprazole  40 mg Oral Daily    riociguat  2.5 mg Oral TID    selexipag  1,600 mcg Oral BID    tiotropium bromide  2 puff Inhalation Daily     PRN Meds:acetaminophen, sodium chloride 0.9%, traMADoL    Review of patient's allergies indicates:   Allergen Reactions    Ace inhibitors     Penicillin     Penicillins Itching    Sulfa (sulfonamide antibiotics) Itching    Sulfadiazine      Objective:      Vital Signs (Most Recent):  Temp: 98.4 °F (36.9 °C) (01/18/22 1138)  Pulse: 88 (01/18/22 1220)  Resp: 18 (01/18/22 1220)  BP: 123/71 (01/18/22 1105)  SpO2: 95 % (01/18/22 1220) Vital Signs (24h Range):  Temp:  [98.1 °F (36.7 °C)-100.1 °F (37.8 °C)] 98.4 °F (36.9 °C)  Pulse:  [79-94] 88  Resp:  [16-26] 18  SpO2:  [72 %-99 %] 95 %  BP: ()/(55-71) 123/71     Patient Vitals for the past 72 hrs (Last 3 readings):   Weight   01/18/22 0400 85.8 kg (189 lb 2.5 oz)   01/17/22 0145 78.8 kg (173 lb 11.6 oz)   01/16/22 2123 82.6 kg (182 lb)     Body mass index is 30.53 kg/m².      Intake/Output Summary (Last 24 hours) at 1/18/2022 1235  Last data filed at 1/18/2022 0300  Gross per 24 hour   Intake 480 ml   Output 1200 ml   Net -720 ml       Hemodynamic Parameters:       Telemetry: reviewed  Physical Exam  Constitutional:       Appearance: Normal appearance.   HENT:      Head: Normocephalic and atraumatic.      Mouth/Throat:      Mouth: Mucous membranes are dry.   Eyes:      Extraocular Movements: Extraocular movements intact.      Pupils: Pupils are equal, round, and reactive to light.   Neck:      Comments: No JVD  Cardiovascular:      Rate and Rhythm: Normal rate and regular rhythm.   Pulmonary:      Breath sounds: Wheezing present.   Abdominal:      Palpations: Abdomen is soft.   Musculoskeletal:      Cervical back: Normal range of motion.      Right lower leg: No edema.      Left lower leg: No edema.   Skin:     General: Skin is warm and dry.   Neurological:      General: No focal deficit present.      Mental Status: She is alert.   Psychiatric:         Mood and Affect: Mood normal.    Significant Labs:  CBC:  Recent Labs   Lab 01/16/22  2303 01/18/22  0701   WBC 6.66 5.88   RBC 3.60* 3.66*   HGB 9.5* 9.5*   HCT 31.1* 32.5*    297   MCV 86 89   MCH 26.4* 26.0*   MCHC 30.5* 29.2*     BNP:  Recent Labs   Lab 01/16/22 2303   BNP 16     CMP:  Recent Labs   Lab 01/16/22 2303 01/17/22  0710 01/18/22  0701   GLU  114* 87 94   CALCIUM 9.3 9.2 9.2   ALBUMIN 3.1*  --   --    PROT 6.8  --   --     139 136   K 3.9 3.8 4.2   CO2 22* 24 24    104 103   BUN 26* 24* 19   CREATININE 1.3 1.2 1.4   ALKPHOS 92  --   --    ALT 12  --   --    AST 20  --   --    BILITOT 0.3  --   --       Coagulation:   Recent Labs   Lab 01/16/22  2303   INR 1.0     LDH:  No results for input(s): LDH in the last 72 hours.  Microbiology:  Microbiology Results (last 7 days)     Procedure Component Value Units Date/Time    Blood culture [[id number]] Collected: 01/17/22 1938    Order Status: Completed Specimen: Blood from Peripheral, Left Hand Updated: 01/18/22 0315     Blood Culture, Routine No Growth to date    Blood culture [[id number]] Collected: 01/17/22 1938    Order Status: Completed Specimen: Blood from Peripheral, Right Arm Updated: 01/18/22 0315     Blood Culture, Routine No Growth to date    Culture, Respiratory with Gram Stain [[id number]]     Order Status: No result Specimen: Respiratory           I have reviewed all pertinent labs within the past 24 hours.    Estimated Creatinine Clearance: 41.3 mL/min (based on SCr of 1.4 mg/dL).    Diagnostic Results:  I have reviewed all pertinent imaging results/findings within the past 24 hours.    Assessment and Plan:     Patient is a 71 y.o. 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    Patient states that since January 7th she has been experiencing a worsening of her baseline dry cough. Additionally, she has been consistently coughing up clots of blood. She is not on any blood thinners and denies any hemetemesis, nausea, abdominal pain, diarrhea, BRBPR or hematochezia. No chest pain or worsening shortness of breath compared to her baseline. No fevers, chills,  weight loss, or night sweats and no recent travel history.  Associated systemic symptoms include diffuse back/flank pain. No complaints of dysuria or hematuria. She has been vaccinated and boosted against COVID-19. On January 8th patient came to [facility] for evaluation of this pain and a CT abdomen/pelvis done revealed perinephric fat stranding and a moderate-large sized pleural effusion. UA done during that visit was largely unremarkable. She was discharged from the ED with outpatient follow up.     Patient states that in the last few days she has been using her rescue inhaler much more often. She has also been wearing her home O2 around the clock vs PRN as she was doing so before. Her niece urged her to go to the hospital for re-evaluation.     Vital signs upon arrival were stable although pt was requiring 2 L NC. Labs were remarkable for a hemoglobin 9.5 (similar to result from 1/8 however lower than pt's prior baseline from [id number]), no leukocytosis, INR 1, BNP 16, creatinine 1.5 (BL). UA largely unremarkable.       * Hemoptysis  -Lung mass found on CT with liver hypo attenuation concerning for Mets.  -Pulm consulted  -CT of chest with contrast to be done today  -Liver biopsy to be done as an outpatient with pulm follow up after biopsy.   -Long-standing history of cigarette usage--50+pack years; quit 5 years ago      Pulmonary hypertension  -PAH (WHO group 1) with most recent RHC 7/2019 - RA: 22/ 15/ 17 RV: 90/ 10/ 20 PA: 90/ 30/ 50 PWP: 25/ 25/ 25  -Will continue home therapy of Adempas 2.5 mg TID, Uptravi 1600 mcg BID, and Opsumit 10 mg daily  -Echo ordered and pending.  Pericardial effusion noted on CT scan     Chronic diastolic heart failure  -Last 2D echo from 3/2021: LVEF 60%, LVEDD 5.5 cm.  Repeat echo ordered for today   -Euvolemic on examination today  -continued home dose of Bumex   -Irbesartan reordered as Losartan 2/2 pharmacy formulary preferences  -Will continue with low-salt/fluid restriction and  strict Is/Os    Pulmonary mass  -see above hemoptysis     Back pain  -Complaining of worsening back pain as well as right flank pain  -UA negative and pain not associated with dysuria/hematuria   -Denies loss of bladder/bowel control, weakness, numbness/tingly sensation      COPD (chronic obstructive pulmonary disease)  -Long-standing smoking history--smoked approx 1 pack daily for over 50 years; quit 5 years ago  -On 2 L home O2 PRN however requiring around the clock oxygen in the last few days  -Will reorder home Albuterol PRN, Symbicort BID and Spiriva         MARIBEL Larkin  Heart Transplant  [facility] - Transplant Stepdown

## 2022-01-19 NOTE — PROGRESS NOTES
[facility]  Heart Transplant  Progress Note    Patient Name: Bessie Davis  MRN: [id number]  Admission Date: 1/16/2022  Hospital Length of Stay: 2 days  Attending Physician: Efarin Solorio Jr.,*  Primary Care Provider: Suhas Beltran MD  Principal Problem:Hemoptysis    Subjective:     Interval History: No complaints. NEON. Remains afebrile. WBC 6.43 and BCx NGTD. Will plan to discharge today with ambulatory referrals to IR and Pulm. Will not resume ASA 81 mg (unknown indication and recent hemoptysis).     Continuous Infusions:  Scheduled Meds:   albuterol  2 puff Inhalation QID    allopurinoL  100 mg Oral Daily    atorvastatin  80 mg Oral QHS    budesonide-formoterol 160-4.5 mcg  2 puff Inhalation Q12H    bumetanide  1 mg Oral Daily    cetirizine  5 mg Oral Daily    ferrous sulfate  1 tablet Oral Daily    fluticasone propionate  2 spray Each Nostril Daily    folic acid  1,000 mcg Oral Daily    losartan  50 mg Oral Daily    macitentan  10 mg Oral Daily    magnesium oxide  400 mg Oral Daily    montelukast  10 mg Oral Daily    pantoprazole  40 mg Oral Daily    riociguat  2.5 mg Oral TID    selexipag  1,600 mcg Oral BID    tiotropium bromide  2 puff Inhalation Daily     PRN Meds:acetaminophen, sodium chloride 0.9%, traMADoL    Review of patient's allergies indicates:   Allergen Reactions    Ace inhibitors     Penicillin     Penicillins Itching    Sulfa (sulfonamide antibiotics) Itching    Sulfadiazine      Objective:     Vital Signs (Most Recent):  Temp: 98.8 °F (37.1 °C) (01/19/22 0830)  Pulse: 87 (01/19/22 1120)  Resp: 20 (01/19/22 1120)  BP: (!) 100/58 (01/19/22 0830)  SpO2: 95 % (01/19/22 1120) Vital Signs (24h Range):  Temp:  [98.4 °F (36.9 °C)-99.2 °F (37.3 °C)] 98.8 °F (37.1 °C)  Pulse:  [87-98] 87  Resp:  [17-26] 20  SpO2:  [91 %-97 %] 95 %  BP: (100-113)/(49-65) 100/58     Patient Vitals for the past 72 hrs (Last 3 readings):   Weight   01/19/22 0300 78.6 kg (173  lb 4.5 oz)   01/18/22 1220 85.7 kg (189 lb)   01/18/22 0400 85.8 kg (189 lb 2.5 oz)     Body mass index is 27.97 kg/m².      Intake/Output Summary (Last 24 hours) at 1/19/2022 1123  Last data filed at 1/19/2022 0400  Gross per 24 hour   Intake 300 ml   Output 900 ml   Net -600 ml       Hemodynamic Parameters:       Telemetry: reviewed  Physical Exam    Constitutional:       Appearance: Normal appearance.   HENT:      Head: Normocephalic and atraumatic.      Mouth/Throat:      Mouth: Mucous membranes are dry.   Eyes:      Extraocular Movements: Extraocular movements intact.      Pupils: Pupils are equal, round, and reactive to light.   Neck:      Comments: No JVD  Cardiovascular:      Rate and Rhythm: Normal rate and regular rhythm.   Pulmonary:      Breath sounds: Wheezing present.   Abdominal:      Palpations: Abdomen is soft.   Musculoskeletal:      Cervical back: Normal range of motion.      Right lower leg: No edema.      Left lower leg: No edema.   Skin:     General: Skin is warm and dry.   Neurological:      General: No focal deficit present.      Mental Status: She is alert.   Psychiatric:         Mood and Affect: Mood normal.    Significant Labs:  CBC:  Recent Labs   Lab 01/16/22 2303 01/18/22  0701 01/19/22  0849   WBC 6.66 5.88 6.43   RBC 3.60* 3.66* 3.72*   HGB 9.5* 9.5* 9.7*   HCT 31.1* 32.5* 32.5*    297 303   MCV 86 89 87   MCH 26.4* 26.0* 26.1*   MCHC 30.5* 29.2* 29.8*     BNP:  Recent Labs   Lab 01/16/22 2303   BNP 16     CMP:  Recent Labs   Lab 01/16/22 2303 01/16/22  2303 01/17/22  0710 01/18/22  0701 01/19/22  0624   *   < > 87 94 95   CALCIUM 9.3   < > 9.2 9.2 8.8   ALBUMIN 3.1*  --   --   --   --    PROT 6.8  --   --   --   --       < > 139 136 138   K 3.9   < > 3.8 4.2 4.3   CO2 22*   < > 24 24 24      < > 104 103 104   BUN 26*   < > 24* 19 26*   CREATININE 1.3   < > 1.2 1.4 1.5*   ALKPHOS 92  --   --   --   --    ALT 12  --   --   --   --    AST 20  --   --   --    --    BILITOT 0.3  --   --   --   --     < > = values in this interval not displayed.      Coagulation:   Recent Labs   Lab 01/16/22  2303   INR 1.0     LDH:  No results for input(s): LDH in the last 72 hours.  Microbiology:  Microbiology Results (last 7 days)     Procedure Component Value Units Date/Time    Blood culture [[id number]] Collected: 01/17/22 1938    Order Status: Completed Specimen: Blood from Peripheral, Left Hand Updated: 01/18/22 2212     Blood Culture, Routine No Growth to date      No Growth to date    Blood culture [[id number]] Collected: 01/17/22 1938    Order Status: Completed Specimen: Blood from Peripheral, Right Arm Updated: 01/18/22 2212     Blood Culture, Routine No Growth to date      No Growth to date    Culture, Respiratory with Gram Stain [[id number]]     Order Status: No result Specimen: Respiratory           I have reviewed all pertinent labs within the past 24 hours.    Estimated Creatinine Clearance: 36.9 mL/min (A) (based on SCr of 1.5 mg/dL (H)).    Diagnostic Results:  I have reviewed all pertinent imaging results/findings within the past 24 hours.    Assessment and Plan:     Patient is a 71 y.o. 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    Patient states that since January 7th she has been experiencing a worsening of her baseline dry cough. Additionally, she has been consistently coughing up clots of blood. She is not on any blood thinners and denies any hemetemesis, nausea, abdominal pain, diarrhea, BRBPR or hematochezia. No chest pain or worsening shortness of breath compared to her baseline. No fevers, chills, weight loss, or night sweats and no recent travel history.  Associated systemic symptoms include diffuse back/flank pain. No  complaints of dysuria or hematuria. She has been vaccinated and boosted against COVID-19. On January 8th [facility] of this pain and a CT abdomen/pelvis done revealed perinephric fat stranding and a moderate-large sized pleural effusion. UA done during that visit was largely unremarkable. She was discharged from the ED with outpatient follow up.     Patient states that in the last few days she has been using her rescue inhaler much more often. She has also been wearing her home O2 around the clock vs PRN as she was doing so before. Her niece urged her to go to the hospital for re-evaluation.     Vital signs upon arrival were stable although pt was requiring 2 L NC. Labs were remarkable for a hemoglobin 9.5 (similar to result from 1/8 however lower than pt's prior baseline from 9925-0008), no leukocytosis, INR 1, BNP 16, creatinine 1.5 (BL). UA largely unremarkable.       * Hemoptysis  - CT of chest with CARLENE mass abutting a large pulmonary artery.  Patient has mediastinal adenopathy and liver densities that have enlarged in two years time.   -IR consulted- plan for liver biopsy as outpatient   -Pulm consulted- Hilar structures are indistinct. CT with contrast complete. Recommend outpatient follow up in pulmonary clinic in 2-4 weeks, preferably after IR biopsy is completed.  -Long-standing history of cigarette usage--50+pack years; quit 5 years ago            Pulmonary mass  -see above hemoptysis     Back pain  -Complaining of worsening back pain as well as right flank pain  -UA negative and pain not associated with dysuria/hematuria   -Denies loss of bladder/bowel control, weakness, numbness/tingly sensation      Chronic diastolic heart failure  -Last 2D echo from 3/2021: LVEF 60%, LVEDD 5.5 cm.  Repeat echo ordered for today   -Euvolemic on examination today  -continued home dose of Bumex   -Irbesartan reordered as Losartan 2/2 pharmacy formulary preferences  -Will continue with  low-salt/fluid restriction and strict Is/Os    Pulmonary hypertension  -PAH (WHO group 1) with most recent RHC 7/2019 - RA: 22/ 15/ 17 RV: 90/ 10/ 20 PA: 90/ 30/ 50 PWP: 25/ 25/ 25  -Will continue home therapy of Adempas 2.5 mg TID, Uptravi 1600 mcg BID, and Opsumit 10 mg daily  -Echo ordered and pending.  Pericardial effusion noted on CT scan     COPD (chronic obstructive pulmonary disease)  -Long-standing smoking history--smoked approx 1 pack daily for over 50 years; quit 5 years ago  -On 2 L home O2 PRN however requiring around the clock oxygen in the last few days  -Will reorder home Albuterol PRN, Symbicort BID and Spiriva         Laure Dawson PA-C  Heart Transplant  [facility]

## 2022-01-19 NOTE — SUBJECTIVE & OBJECTIVE
Interval History: No complaints. NEON. Remains afebrile. WBC 6.43 and BCx NGTD. Will plan to discharge today with ambulatory referrals to IR and Pulm. Will not resume ASA 81 mg (unknown indication and recent hemoptysis).     Continuous Infusions:  Scheduled Meds:   albuterol  2 puff Inhalation QID    allopurinoL  100 mg Oral Daily    atorvastatin  80 mg Oral QHS    budesonide-formoterol 160-4.5 mcg  2 puff Inhalation Q12H    bumetanide  1 mg Oral Daily    cetirizine  5 mg Oral Daily    ferrous sulfate  1 tablet Oral Daily    fluticasone propionate  2 spray Each Nostril Daily    folic acid  1,000 mcg Oral Daily    losartan  50 mg Oral Daily    macitentan  10 mg Oral Daily    magnesium oxide  400 mg Oral Daily    montelukast  10 mg Oral Daily    pantoprazole  40 mg Oral Daily    riociguat  2.5 mg Oral TID    selexipag  1,600 mcg Oral BID    tiotropium bromide  2 puff Inhalation Daily     PRN Meds:acetaminophen, sodium chloride 0.9%, traMADoL    Review of patient's allergies indicates:   Allergen Reactions    Ace inhibitors     Penicillin     Penicillins Itching    Sulfa (sulfonamide antibiotics) Itching    Sulfadiazine      Objective:     Vital Signs (Most Recent):  Temp: 98.8 °F (37.1 °C) (01/19/22 0830)  Pulse: 87 (01/19/22 1120)  Resp: 20 (01/19/22 1120)  BP: (!) 100/58 (01/19/22 0830)  SpO2: 95 % (01/19/22 1120) Vital Signs (24h Range):  Temp:  [98.4 °F (36.9 °C)-99.2 °F (37.3 °C)] 98.8 °F (37.1 °C)  Pulse:  [87-98] 87  Resp:  [17-26] 20  SpO2:  [91 %-97 %] 95 %  BP: (100-113)/(49-65) 100/58     Patient Vitals for the past 72 hrs (Last 3 readings):   Weight   01/19/22 0300 78.6 kg (173 lb 4.5 oz)   01/18/22 1220 85.7 kg (189 lb)   01/18/22 0400 85.8 kg (189 lb 2.5 oz)     Body mass index is 27.97 kg/m².      Intake/Output Summary (Last 24 hours) at 1/19/2022 1123  Last data filed at 1/19/2022 0400  Gross per 24 hour   Intake 300 ml   Output 900 ml   Net -600 ml       Hemodynamic Parameters:        Telemetry: reviewed  Physical Exam    Constitutional:       Appearance: Normal appearance.   HENT:      Head: Normocephalic and atraumatic.      Mouth/Throat:      Mouth: Mucous membranes are dry.   Eyes:      Extraocular Movements: Extraocular movements intact.      Pupils: Pupils are equal, round, and reactive to light.   Neck:      Comments: No JVD  Cardiovascular:      Rate and Rhythm: Normal rate and regular rhythm.   Pulmonary:      Breath sounds: Wheezing present.   Abdominal:      Palpations: Abdomen is soft.   Musculoskeletal:      Cervical back: Normal range of motion.      Right lower leg: No edema.      Left lower leg: No edema.   Skin:     General: Skin is warm and dry.   Neurological:      General: No focal deficit present.      Mental Status: She is alert.   Psychiatric:         Mood and Affect: Mood normal.    Significant Labs:  CBC:  Recent Labs   Lab 01/16/22 2303 01/18/22  0701 01/19/22  0849   WBC 6.66 5.88 6.43   RBC 3.60* 3.66* 3.72*   HGB 9.5* 9.5* 9.7*   HCT 31.1* 32.5* 32.5*    297 303   MCV 86 89 87   MCH 26.4* 26.0* 26.1*   MCHC 30.5* 29.2* 29.8*     BNP:  Recent Labs   Lab 01/16/22 2303   BNP 16     CMP:  Recent Labs   Lab 01/16/22 2303 01/16/22 2303 01/17/22  0710 01/18/22  0701 01/19/22  0624   *   < > 87 94 95   CALCIUM 9.3   < > 9.2 9.2 8.8   ALBUMIN 3.1*  --   --   --   --    PROT 6.8  --   --   --   --       < > 139 136 138   K 3.9   < > 3.8 4.2 4.3   CO2 22*   < > 24 24 24      < > 104 103 104   BUN 26*   < > 24* 19 26*   CREATININE 1.3   < > 1.2 1.4 1.5*   ALKPHOS 92  --   --   --   --    ALT 12  --   --   --   --    AST 20  --   --   --   --    BILITOT 0.3  --   --   --   --     < > = values in this interval not displayed.      Coagulation:   Recent Labs   Lab 01/16/22 2303   INR 1.0     LDH:  No results for input(s): LDH in the last 72 hours.  Microbiology:  Microbiology Results (last 7 days)     Procedure Component Value Units Date/Time     Blood culture [[id number]] Collected: 01/17/22 1938    Order Status: Completed Specimen: Blood from Peripheral, Left Hand Updated: 01/18/22 2212     Blood Culture, Routine No Growth to date      No Growth to date    Blood culture [[id number]] Collected: 01/17/22 1938    Order Status: Completed Specimen: Blood from Peripheral, Right Arm Updated: 01/18/22 2212     Blood Culture, Routine No Growth to date      No Growth to date    Culture, Respiratory with Gram Stain [[id number]]     Order Status: No result Specimen: Respiratory           I have reviewed all pertinent labs within the past 24 hours.    Estimated Creatinine Clearance: 36.9 mL/min (A) (based on SCr of 1.5 mg/dL (H)).    Diagnostic Results:  I have reviewed all pertinent imaging results/findings within the past 24 hours.

## 2022-01-19 NOTE — PROGRESS NOTES
Patient discharged per order. Telemetry monitoring, visi monitoring and PIV d/c'd per order. No medications to be picked up from pharmacy. No acute distress noted. Patient awaiting niece for ride home. AVS given to patient and patient verbalizes complete understanding of all discharge instructions.

## 2022-01-19 NOTE — DISCHARGE SUMMARY
[facility]  Heart Transplant  Discharge Summary      Patient Name: Bessie Davis  MRN: [id number]  Admission Date: 1/16/2022  Hospital Length of Stay: 2 days  Discharge Date and Time: 01/19/2022 11:42 AM  Attending Physician: Efrain Solorio Jr.,*   Discharging Provider: Laure Dawson PA-C  Primary Care Provider: Suhas Beltran MD     HPI: Patient is a 71 y.o. 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    Patient states that since January 7th she has been experiencing a worsening of her baseline dry cough. Additionally, she has been consistently coughing up clots of blood. She is not on any blood thinners and denies any hemetemesis, nausea, abdominal pain, diarrhea, BRBPR or hematochezia. No chest pain or worsening shortness of breath compared to her baseline. No fevers, chills, weight loss, or night sweats and no recent travel history.  Associated systemic symptoms include diffuse back/flank pain. No complaints of dysuria or hematuria. She has been vaccinated and boosted against COVID-19. On January 8th patient came to [facility] for evaluation of this pain and a CT abdomen/pelvis done revealed perinephric fat stranding and a moderate-large sized pleural effusion. UA done during that visit was largely unremarkable. She was discharged from the ED with outpatient follow up.     Patient states that in the last few days she has been using her rescue inhaler much more often. She has also been wearing her home O2 around the clock vs PRN as she was doing so before. Her niece urged her to go to the hospital for re-evaluation.     Vital signs upon arrival were stable although pt was requiring 2 L NC. Labs were remarkable for a hemoglobin 9.5 (similar to result from  1/8 however lower than pt's prior baseline from 0882-6665), no leukocytosis, INR 1, BNP 16, creatinine 1.5 (BL). UA largely unremarkable.       * No surgery found *     Hospital Course: Admitted with hemoptysis and flank/back pain. CT of chest with CARLENE mass abutting a large pulmonary artery.  Patient has mediastinal adenopathy and liver densities that have enlarged in two years time. IR consulted, will plan for liver biopsy as outpatient. Pulm consulted, recommended follow up in pulmonary clinic in 2-4 weeks (preferably after IR biopsy complete). Of note, had low grade fever of 100.1 F while admitted, empiric IV antibiotics were initiated but discontinued the next day as no other signs or symptoms of infection. WBC 6.43 and blood cultures NGTD. Discharged with ambulatory referrals to IR and pulm. Discontinued home ASA 81 mg with recent hemoptysis and unknown indication for medication (no hx of ischemia).       Goals of Care Treatment Preferences:  Code Status: Full Code    Living Will: Yes              Consults (From admission, onward)        Status Ordering Provider     Inpatient consult to Pulmonology  Once        Provider:  (Not yet assigned)    Completed RAYSHAWN NGUYEN     Inpatient consult to Pulmonology  Once        Provider:  (Not yet assigned)    Completed SOHAIL CEJA          Significant Diagnostic Studies: Labs:   BMP:   Recent Labs   Lab 01/18/22  0701 01/19/22  0624   GLU 94 95    138   K 4.2 4.3    104   CO2 24 24   BUN 19 26*   CREATININE 1.4 1.5*   CALCIUM 9.2 8.8   MG 1.8 1.9    and CBC   Recent Labs   Lab 01/18/22  0701 01/18/22  0701 01/19/22  0849   WBC 5.88  --  6.43   HGB 9.5*  --  9.7*   HCT 32.5*   < > 32.5*     --  303    < > = values in this interval not displayed.       Pending Diagnostic Studies:     None        Final Active Diagnoses:    Diagnosis Date Noted POA    PRINCIPAL PROBLEM:  Hemoptysis [R04.2] 01/17/2022 Yes    Back pain [M54.9] 01/17/2022 Yes     Pulmonary mass [R91.8] 01/17/2022 Yes    Sleep apnea [G47.30]  Yes    Chronic diastolic heart failure [I50.32] 12/02/2019 Yes    Pulmonary hypertension [I27.20] 05/11/2015 Yes    COPD (chronic obstructive pulmonary disease) [J44.9] 05/01/2015 Yes      Problems Resolved During this Admission:      Discharged Condition: stable    Disposition: Home or Self Care    Follow Up:   Follow-up Information     [id number] In 3 weeks.    Specialty: Transplant  Contact information:  [facility]  [facility] [address] [phone]  Additional information:  [facility] - Main Building, [address] [address] Floor   Please park in [facility] and use [facility] elevator                     Patient Instructions:      Ambulatory referral/consult to Pulmonology   Standing Status: Future   Referral Priority: Routine Referral Type: Consultation   Referral Reason: Specialty Services Required   Requested Specialty: Pulmonary Disease     Ambulatory referral/consult to Interventional Radiology   Standing Status: Future   Referral Priority: Urgent Referral Type: Consultation   Referral Reason: Specialty Services Required   Requested Specialty: Interventional Radiology   Number of Visits Requested: 1     Notify your health care provider if you experience any of the following:  increased confusion or weakness     Notify your health care provider if you experience any of the following:  persistent dizziness, light-headedness, or visual disturbances     Notify your health care provider if you experience any of the following:  worsening rash     Notify your health care provider if you experience any of the following:  severe persistent headache     Notify your health care provider if you experience any of the following:  difficulty breathing or increased cough     Notify your health care provider if you experience any of the following:  redness, tenderness, or signs of infection (pain, swelling,  redness, odor or green/yellow discharge around incision site)     Notify your health care provider if you experience any of the following:  severe uncontrolled pain     Notify your health care provider if you experience any of the following:  persistent nausea and vomiting or diarrhea     Notify your health care provider if you experience any of the following:  temperature >100.4     Activity as tolerated     Medications:  Reconciled Home Medications:      Medication List      CHANGE how you take these medications    cetirizine 5 MG tablet  Commonly known as: ZYRTEC  Take 1 tablet (5 mg total) by mouth once daily.  What changed:   · when to take this  · reasons to take this        CONTINUE taking these medications    ADEMPAS 2.5 mg tablet  Generic drug: riociguat  Take 2.5 mg by mouth 3 (three) times daily.     AFRIN SINUS (OXYMETAZOLINE) 0.05 % nasal spray  Generic drug: oxymetazoline  2 sprays by Nasal route 2 (two) times daily.     albuterol 90 mcg/actuation inhaler  Commonly known as: PROVENTIL/VENTOLIN HFA  Inhale 2 puffs into the lungs every 6 (six) hours as needed for Wheezing.     allopurinoL 100 MG tablet  Commonly known as: ZYLOPRIM  Take 100 mg by mouth once daily.     ammonium lactate 12 % Crea  Apply to feet daily after showering     atorvastatin 80 MG tablet  Commonly known as: LIPITOR  Take 1 tablet (80 mg total) by mouth every evening. DO NOT TAKE UNTIL FOLLOWING UP WITH MD.     bumetanide 1 MG tablet  Commonly known as: BUMEX  Take 1 tablet (1 mg total) by mouth once daily.     CALCIUM ACETATE ORAL  Take 1 tablet by mouth once daily.     COLCRYS 0.6 mg tablet  Generic drug: colchicine  Take 0.6 mg by mouth as needed.     ferrous sulfate 325 mg (65 mg iron) Tab tablet  Commonly known as: FEOSOL  Take 1 tablet by mouth once daily.     fluticasone propionate 50 mcg/actuation nasal spray  Commonly known as: FLONASE     folic acid 1 MG tablet  Commonly known as: FOLVITE  Take 1,000 mcg by mouth once  daily.     guaiFENesin 600 mg 12 hr tablet  Commonly known as: MUCINEX  Take 1,200 mg by mouth 2 (two) times daily as needed for Congestion.     HYDROcodone-acetaminophen 5-300 mg Tab  Take 1 tablet by mouth 2 (two) times daily as needed (for pain).     irbesartan 150 MG tablet  Commonly known as: AVAPRO  Take 150 mg by mouth once daily.     magnesium oxide 400 mg (241.3 mg magnesium) tablet  Commonly known as: MAG-OX  Take 1 tablet (400 mg total) by mouth once daily.     metFORMIN 500 MG tablet  Commonly known as: GLUCOPHAGE  Take 500 mg by mouth 2 (two) times daily.     montelukast 10 mg tablet  Commonly known as: SINGULAIR     OCEAN NASAL 0.65 % nasal spray  Generic drug: sodium chloride  1 spray by Nasal route.     OPSUMIT 10 mg Tab  Generic drug: macitentan  Take 1 tablet (10 mg total) by mouth once daily.     pantoprazole 40 MG tablet  Commonly known as: PROTONIX  pantoprazole 40 mg tablet,delayed release     promethazine-codeine 6.25-10 mg/5 ml 6.25-10 mg/5 mL syrup  Commonly known as: PHENERGAN with CODEINE  TAKE 1-2 TEASPOONSFUL BY MOUTH 4 TIMES DAILY AS NEEDED FOR COUGH     SPIRIVA RESPIMAT 2.5 mcg/actuation inhaler  Generic drug: tiotropium bromide     SYMBICORT 160-4.5 mcg/actuation Hfaa  Generic drug: budesonide-formoterol 160-4.5 mcg  2 puffs.     UPTRAVI 1,600 mcg Tab  Generic drug: selexipag  Take by mouth 2 (two) times a day.     VITAMIN D3 COMPLETE ORAL  Take 500 mg by mouth.        STOP taking these medications    aspirin 81 MG EC tablet  Commonly known as: ECOLAZARO Dawson PA-C  Heart Transplant  [facility]

## 2022-01-19 NOTE — ASSESSMENT & PLAN NOTE
- CT of chest with CARLENE mass abutting a large pulmonary artery.  Patient has mediastinal adenopathy and liver densities that have enlarged in two years time.   -IR consulted- plan for liver biopsy as outpatient   -Pulm consulted- Hilar structures are indistinct. CT with contrast complete. Recommend outpatient follow up in pulmonary clinic in 2-4 weeks, preferably after IR biopsy is completed.  -Long-standing history of cigarette usage--50+pack years; quit 5 years ago

## 2022-01-19 NOTE — PROGRESS NOTES
Discharge    Pt presents in room with niece/goddgmalika Kemp. Pt aaox4 with pleasant affect. Pt states in agreement with plan to discharge to home today. No home needs voiced by pt or identified by medical team. Viridiana will transport pt home. Pt reports coping well and denies further needs, questions, concerns at this time and none indicated. Providing psychosocial and counseling support, education, resources, assistance and discharge planning as indicated. SW remains available.

## 2022-01-19 NOTE — PLAN OF CARE
AAOx3, afebrile, w/o c/o pain. Telemetry monitor in place (NSR). CT and ECHO done yesterday. Plan for outpt liver bx. Pt refused CPAP- Dr. Alvarez made aware. Pt on 2-3L NC. Inhaler done with respiratory. Awaiting resp cx. Pt able to position self independently in bed. X1 person assist OOB. Pt in lowest position, side rails up x2, non-skid foot wear in place, call light within reach, pt verbalized understanding to call RN when needed.  Hand hygiene practiced per protocol.  Will continue to monitor.

## 2022-01-21 NOTE — PATIENT INSTRUCTIONS
"Patient Education       Coughing up Blood   The Basics   Written by the doctors and editors at [facility]   What happens when a person coughs up blood? -- When a person coughs up blood, it usually means the blood is coming from their airways or lungs (figure 1). A person might cough up just blood, or blood mixed with mucus. When blood is mixed with mucus, it can look red or pink, or it can be almost all mucus with streaks of blood.  The term doctors use for coughing up blood is "hemoptysis." Coughing up blood can happen in adults and older children, but it is uncommon in young children.  Sometimes, a person might cough up blood that does not come from the airways or lungs. Blood from the nose, mouth, or stomach can drip into the throat and be coughed up. People can often feel where the blood is coming from, but not always.  If a person coughs up a lot of blood, doctors call it "massive hemoptysis." This can be a medical emergency. This article discusses coughing up blood that is not a medical emergency.  What causes people to cough up blood? -- In adults, there are many causes of coughing up blood, but the most common causes are:  · Bronchitis - Bronchitis means inflammation of the bronchi. The bronchi are the tubes that carry air into the lungs (figure 1). There are 2 types of bronchitis. "Acute" bronchitis is an infection of the bronchi. "Chronic" bronchitis is a condition in which the bronchi get damaged, for example by cigarette smoking.  · Infections of the lungs, such as pneumonia  · Bronchiectasis - This is a condition in which the airways are damaged and get infected easily. It has different causes.  · Cancer that affects the bronchi or airways  In children, the most common causes of coughing up blood are:  · An infection of the bronchi or lungs  · Having an object or piece of food stuck in the airway - The object might be stuck for days or weeks before a child starts coughing up blood.  · Bronchiectasis - In " "children, this is usually caused by "cystic fibrosis," a condition some children are born with. Cystic fibrosis causes thick mucus to build up in the lungs, which leads to frequent lung infections.  Should I call a doctor or nurse? -- Yes. Any time you or your child coughs up blood or mucus mixed with blood, call the doctor or nurse right away.  Let the doctor or nurse know if you remember seeing your child choke on something, even if it was days or weeks ago.  If you cough up a very large amount of blood (about 1 cup or more), or have trouble breathing, call for an ambulance (in the [place] and [place], dial [id number]).  Will I need tests? -- Maybe. Your doctor or nurse will ask about your symptoms and do an exam. Based on your symptoms and other factors, they might do tests. These tests can help your doctor or nurse find out why you're bleeding and where the bleeding is coming from.  Tests can include:  · A chest X-ray  · Lab tests - These might include tests of your blood or a sample of the mucus you cough up.  · Bronchoscopy - This is a procedure in which a doctor uses a thin tube (called a "bronchoscope") to look inside your airways.  · A CT scan - This is an imaging test that creates pictures of the inside of your body.  Is there anything I can do on my own to stop coughing up blood? -- Yes. If you smoke cigarettes, the most helpful thing you can do is stop smoking. If you take a medicine that keeps blood clots from forming (a "blood thinning" or "anti-clotting" medicine), let your doctor or nurse know. They might change your dose.  Your doctor or nurse might also tell you to avoid "NSAID" medicines like ibuprofen (sample brand names: Advil, Motrin) and naproxen (sample brand names: Aleve, Naprosyn).   For teenagers and adults who have small streaks of blood in their mucus, a doctor might suggest trying an over-the-counter cough medicine to control the cough. But do not give any cough or cold medicines to young " "children. These medicines are unlikely to help and can have serious side effects in young children.  How is coughing up blood treated? -- If your symptoms are mild and you have had a normal chest X-ray, you might not need treatment.  If you do need treatment, your doctor will treat the condition that's causing you to cough up blood. They can also help stop the bleeding by:  · Prescribing a cough medicine to keep you from coughing  · Prescribing an antibiotic if you have bronchiectasis or an infection  · Changing or stopping your blood thinning medicine, if you take one  · Stopping your NSAID medicine, if you take one  If you are coughing up a lot of blood, your doctor might do a procedure to stop the bleeding. The type of procedure depends on the cause of your cough:  · If you have bronchiectasis, doctors can do a procedure called "bronchial artery embolization" to help stop the bleeding. During this procedure, the doctor puts a thin tube into an artery in the leg and moves it up to the lungs. Then they use tiny tools to block the artery in the bleeding area.  · If you have lung cancer, your doctor might prescribe radiation or chemotherapy. These treatments kill cancer cells or stop them from growing. The treatment can also help stop bleeding related to cancer.  · If you have an object or food stuck in your airway, your doctor can remove it during a bronchoscopy.   All topics are updated as new evidence becomes available and our peer review process is complete.  This topic retrieved from [employer] on: Sep 21, 2021.  Topic 59846 Version 10.0  Release: 29.4.2 - C29.263  © 2021 UpToDate, Inc. and/or its affiliates. All rights reserved.  figure 1: Normal lungs     The lungs sit in the chest, inside the ribcage. They are covered with a thin membrane called the "pleura." The windpipe, or trachea, branches into two smaller airways called the left and right "bronchi." The space between the lungs is called the "mediastinum." " Lymph nodes are located within and around the lungs and mediastinum.  Graphic 99074 Version 13.0    Consumer Information Use and Disclaimer   This information is not specific medical advice and does not replace information you receive from your health care provider. This is only a brief summary of general information. It does NOT include all information about conditions, illnesses, injuries, tests, procedures, treatments, therapies, discharge instructions or life-style choices that may apply to you. You must talk with your health care provider for complete information about your health and treatment options. This information should not be used to decide whether or not to accept your health care provider's advice, instructions or recommendations. Only your health care provider has the knowledge and training to provide advice that is right for you. The use of this information is governed by the [employer] End User License Agreement, available at https://www.[employer]/en/solutions/[employer]/about/elvia.The use of [employer] content is governed by the [employer] Terms of Use. ©2021 UpToDate, Inc. All rights reserved.  Copyright   © 2022 UpToDate, Inc. and/or its affiliates. All rights reserved.  [place] teaching reviewed with Viridiana Corey . She verbalized understanding.    Education was provided based on the patient's discharge diagnosis using the attached [place] patient education as a reference.

## 2022-01-21 NOTE — PROGRESS NOTES
C3 nurse spoke with Bessie Davis's niece, Viridiana Corey, for a TCC post hospital discharge follow up call. The patient has a scheduled HOSFU appointment with TANIKA Nazario NP on 1/27/2022 NP @ [place].

## 2022-01-21 NOTE — PROGRESS NOTES
C3 nurse attempted to contact Bessie Davis's niece, Viridiana Corey, for a TCC post hospital discharge follow up call. No answer. No voicemail available.The patient does not have a scheduled HOSFU appointment.

## 2022-01-21 NOTE — PROGRESS NOTES
C3 nurse attempted to contact Bessie Davis for a TCC post hospital discharge follow up call. The patient is unable to conduct the call @ this time. The patient's niece requested a callback.    The patient does not have a scheduled HOSFU appointment within 7 days post hospital discharge date 1/19/2022. Patient PCP not within [place].

## 2022-01-26 NOTE — TELEPHONE ENCOUNTER
Spoke with Lydia regarding scheduling patient hospital f/u visit.     Offered patient appointments    Appointment date and time selected    Appointment details provided informed to call facility with any questions or concerns.

## 2022-01-26 NOTE — TELEPHONE ENCOUNTER
----- Message from Verenice Navarrete sent at 1/26/2022  2:23 PM CST -----  Name Of Caller: Lydia     Provider Name: Chula Marinelli,     Does patient feel the need to be seen today?no     Relationship to the Pt?: pt     Contact Preference?: [phone] (Dede)    What is the nature of the call?Lydia called and would like to schedule a hospital fallow up to please call back to schedule appt

## 2022-01-27 NOTE — PROGRESS NOTES
"[facility]  Transition of Care Home Visit    Visit Date: 1/27/2022  Encounter Provider: Valarie Nazario NP  PCP:  Suhas Beltran MD    PRESENTING HISTORY      Patient ID: Bessie Davis is a 71 y.o. female.    Consult Requested By:  No ref. provider found  Reason for Consult:  Hospital follow up    Bessie is being seen at home due to   physical debility that presents a taxing effort to leave the home, to mitigate high risk of hospital readmission or due to the limited availability of reliable or safe options for transportation to the point of access to the provider. The visit meets the criteria for medical necessity as defined by CMS as "health-care services needed to prevent, diagnose, or treat an illness, injury, condition, disease, or its symptoms and that meet accepted standards of medicine."  Prior to treatment on this visit the chart was reviewed and patient consent was obtained.        Chief Complaint: No chief complaint on file.  History of Present Illness: Ms. Bessie Davis is a 71 y.o. female who was recently admitted to [facility].    Admission Date: 1/16/2022  Hospital Length of Stay: 2 days  Discharge Date and Time: 01/19/2022    HPI: Patient is a 71 y.o. 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     Patient states that since January 7th she has been experiencing a worsening of her baseline dry cough. Additionally, she has been consistently coughing up clots of blood. She is not on any blood thinners and denies any hemetemesis, nausea, abdominal pain, diarrhea, BRBPR or hematochezia. No chest pain or worsening shortness of breath compared to her baseline. No fevers, chills, weight loss, or night sweats and no recent travel history.  " Associated systemic symptoms include diffuse back/flank pain. No complaints of dysuria or hematuria. She has been vaccinated and boosted against COVID-19. On January 8th patient came [place] [facility] [place] of this pain and a CT abdomen/pelvis done revealed perinephric fat stranding and a moderate-large sized pleural effusion. UA done during that visit was largely unremarkable. She was discharged from the ED with outpatient follow up.      Patient states that in the last few days she has been using her rescue inhaler much more often. She has also been wearing her home O2 around the clock vs PRN as she was doing so before. Her niece urged her to go to the hospital for re-evaluation.      Vital signs upon arrival were stable although pt was requiring 2 L NC. Labs were remarkable for a hemoglobin 9.5 (similar to result from 1/8 however lower than pt's prior baseline from [id number]), no leukocytosis, INR 1, BNP 16, creatinine 1.5 (BL). UA largely unremarkable.         * No surgery found *      Hospital Course: Admitted with hemoptysis and flank/back pain. CT of chest with CARLENE mass abutting a large pulmonary artery.  Patient has mediastinal adenopathy and liver densities that have enlarged in two years time. IR consulted, will plan for liver biopsy as outpatient. Pulm consulted, recommended follow up in pulmonary clinic in 2-4 weeks (preferably after IR biopsy complete). Of note, had low grade fever of 100.1 F while admitted, empiric IV antibiotics were initiated but discontinued the next day as no other signs or symptoms of infection. WBC 6.43 and blood cultures NGTD. Discharged with ambulatory referrals to IR and pulm. Discontinued home ASA 81 mg with recent hemoptysis and unknown indication for medication (no hx of ischemia).   ________________________________________________________  Today: Patient is being evaluated at home for a transition of care visit s/p hospitalization, see hospital course above.     With this  visit today patient is found lying in her bed watching TV. Patient is AAOx3, able to verify her name and . Most of patients other history was received from her niece who is her primary caregiver and her medical record. She endorses eating x 3 small meals per day . SHe endorses regular BMs . She is ambulatory with a rolling walker and schedules activities in intervals due to poor tolerance. She is continent of B&B.       VSS. Denies fever, chest pain, shortness of breath, nausea, vomiting, diarrhea. Risks of environmental exposure to coronavirus discussed including: social distancing, hand hygiene, and limiting departures from the home for necessities only.  Reports understanding and willingness to comply.  All hospital discharge orders reviewed and being followed, all medications reconciled and reviewed, patient and family verbalized understanding. No other needs identified at this time.     I initiated the process of advance care planning today and explained the importance of this process to the patient and family.  I introduced the concept of advance directives to the patient, as well. Then the patient received detailed information about the importance of designating a Health Care Power of  (HCPOA). She was also instructed to communicate with this person about his wishes for future healthcare, should he become sick and lose decision-making capacity. FULL CODE STATUS    I Introduced [place] form with patient/family, explaining this is the patient's wishes, and this form will be uploaded into the patient's [facility] and the [place] Registry.     We spoke about ACP for 20 minutes.    Attestation: Screening criteria to assess the level of the patient's risk for infection with COVID-19 as recommended by the CDC at the time of the above documented home visit concluded appropriateness to proceed. Universal precautions were maintained at all times, including provider use of 60% alcohol gel hand   immediately prior to entry and upon departing the patient's home.        Review of Systems   Constitutional: Positive for activity change and fatigue. Negative for unexpected weight change.   HENT: Negative for congestion and trouble swallowing.    Eyes: Negative for redness and visual disturbance.   Respiratory: Positive for cough and shortness of breath.    Cardiovascular: Negative for chest pain and palpitations.   Gastrointestinal: Negative for abdominal distention, abdominal pain and nausea.   Endocrine: Negative for polydipsia and polyuria.   Genitourinary: Negative for difficulty urinating and dyspareunia.   Musculoskeletal: Positive for gait problem. Negative for back pain.   Skin: Negative for color change and rash.   Allergic/Immunologic: Negative for food allergies.   Neurological: Positive for weakness. Negative for dizziness and tremors.   Psychiatric/Behavioral: Negative for agitation and confusion.       Assessments:  · Environmental: Lives in single story apartment complex with no steps to entered, cluttered  · Functional Status: Min asst with ADLs, walker for mobility  · Safety: Fall precautions  · Nutritional: Fall precautions  · Home Health/DME/Supplies: walker    PAST HISTORY:     Past Medical History:   Diagnosis Date    Asthma     COPD (chronic obstructive pulmonary disease)     Diastolic dysfunction with heart failure 5/20/2015    Occl RCA, high grade prox LAD and LCfx cath [facility] Dec 2014 5/11/2015    Pulmonary HTN     Sleep apnea     Sleep apnea        Past Surgical History:   Procedure Laterality Date    BACK SURGERY      HYSTERECTOMY      RIGHT HEART CATHETERIZATION Right 9/17/2018    Procedure: HEART CATH-RIGHT;  Surgeon: Le Salmeron MD;  Location: [facility] CATH LAB;  Service: Cardiology;  Laterality: Right;    RIGHT HEART CATHETERIZATION Right 7/8/2019    Procedure: HEART CATH-RIGHT;  Surgeon: Le Salmeron MD;  Location: [facility] CATH LAB;  Service: Cardiology;  Laterality:  Right;       Family History   Problem Relation Age of Onset    Cancer Sister        Social History     Socioeconomic History    Marital status:    Tobacco Use    Smoking status: Former Smoker     Packs/day: 0.25     Years: 15.00     Pack years: 3.75     Types: Cigarettes     Quit date: 2016     Years since quittin.2    Smokeless tobacco: Never Used   Substance and Sexual Activity    Alcohol use: No    Drug use: No       MEDICATIONS & ALLERGIES:     Current Outpatient Medications on File Prior to Visit   Medication Sig Dispense Refill    albuterol 90 mcg/actuation inhaler Inhale 2 puffs into the lungs every 6 (six) hours as needed for Wheezing. 18 g 12    allopurinol (ZYLOPRIM) 100 MG tablet Take 100 mg by mouth once daily.       ammonium lactate 12 % Crea Apply to feet daily after showering  3    atorvastatin (LIPITOR) 80 MG tablet Take 1 tablet (80 mg total) by mouth every evening. DO NOT TAKE UNTIL FOLLOWING UP WITH MD. 30 tablet 12    budesonide-formoterol 160-4.5 mcg (SYMBICORT) 160-4.5 mcg/actuation HFAA 2 puffs.      bumetanide (BUMEX) 1 MG tablet Take 1 tablet (1 mg total) by mouth once daily. 180 tablet 3    CALCIUM ACETATE ORAL Take 1 tablet by mouth once daily.      cetirizine (ZYRTEC) 5 MG tablet Take 1 tablet (5 mg total) by mouth once daily. (Patient taking differently: Take 5 mg by mouth as needed.)  0    colchicine (COLCRYS) 0.6 mg tablet Take 0.6 mg by mouth as needed.       ferrous sulfate (FEOSOL) 325 mg (65 mg iron) Tab tablet Take 1 tablet by mouth once daily.      fluticasone (FLONASE) 50 mcg/actuation nasal spray       folic acid (FOLVITE) 1 MG tablet Take 1,000 mcg by mouth once daily.      guaifenesin (MUCINEX) 600 mg 12 hr tablet Take 1,200 mg by mouth 2 (two) times daily as needed for Congestion.      HYDROcodone-acetaminophen (NORCO) 7.5-325 mg per tablet Take 1 tablet by mouth 2 (two) times daily as needed (for pain).   0    irbesartan (AVAPRO) 150 MG  tablet Take 150 mg by mouth once daily.  1    macitentan (OPSUMIT) 10 mg Tab Take 1 tablet (10 mg total) by mouth once daily. 30 tablet 11    magnesium oxide (MAG-OX) 400 mg tablet Take 1 tablet (400 mg total) by mouth once daily.  0    metFORMIN (GLUCOPHAGE) 500 MG tablet Take 500 mg by mouth 2 (two) times daily.      montelukast (SINGULAIR) 10 mg tablet       mv-mn/iron/folic acid/herb 190 (VITAMIN D3 COMPLETE ORAL) Take 500 mg by mouth.      oxymetazoline (AFRIN) 0.05 % nasal spray 2 sprays by Nasal route 2 (two) times daily.      pantoprazole (PROTONIX) 40 MG tablet pantoprazole 40 mg tablet,delayed release      promethazine-codeine 6.25-10 mg/5 ml (PHENERGAN WITH CODEINE) 6.25-10 mg/5 mL syrup TAKE 1-2 TEASPOONSFUL BY MOUTH 4 TIMES DAILY AS NEEDED FOR COUGH  0    selexipag 1,600 mcg Tab Take by mouth 2 (two) times a day.       sodium chloride (OCEAN NASAL) 0.65 % nasal spray 1 spray by Nasal route.       SPIRIVA RESPIMAT 2.5 mcg/actuation Mist        No current facility-administered medications on file prior to visit.        Review of patient's allergies indicates:   Allergen Reactions    Ace inhibitors     Penicillin     Penicillins Itching    Sulfa (sulfonamide antibiotics) Itching    Sulfadiazine        OBJECTIVE:     Vital Signs:  Vitals:    01/27/22 1719   BP: (!) 140/81   Pulse: 77   Resp: 18   Temp: 98.1 °F (36.7 °C)     Body mass index is 27.92 kg/m².     Physical Exam:  Physical Exam  HENT:      Head: Normocephalic and atraumatic.      Right Ear: Tympanic membrane normal.      Left Ear: Tympanic membrane normal.      Nose: Nose normal.      Mouth/Throat:      Mouth: Mucous membranes are dry.   Eyes:      Pupils: Pupils are equal, round, and reactive to light.   Cardiovascular:      Rate and Rhythm: Normal rate.   Pulmonary:      Effort: Pulmonary effort is normal.   Abdominal:      General: Abdomen is flat.      Palpations: Abdomen is soft.   Musculoskeletal:         General: Normal  range of motion.   Skin:     General: Skin is warm and dry.      Capillary Refill: Capillary refill takes less than 2 seconds.   Neurological:      General: No focal deficit present.      Mental Status: She is alert.   Psychiatric:         Mood and Affect: Mood normal.         Laboratory  Lab Results   Component Value Date    WBC 8.09 02/01/2022    HGB 8.9 (L) 02/01/2022    HCT 29.1 (L) 02/01/2022    MCV 85 02/01/2022     02/01/2022     Lab Results   Component Value Date    INR 1.0 01/16/2022    INR 1.0 12/04/2019    INR 0.9 12/03/2019     Lab Results   Component Value Date    HGBA1C 6.1 05/02/2015     No results for input(s): POCTGLUCOSE in the last 72 hours.    Diagnostic Results:  n/a    TRANSITION OF CARE:     [facility] Contact Note: 1/21/2022    Family and/or Caretaker present at visit?  Yes.  Diagnostic tests reviewed/disposition: No diagnosic tests pending after this hospitalization.  Disease/illness education: Fall precautions  Home health/community services discussion/referrals: Patient does not have home health established from hospital visit.  They do not need home health.  If needed, we will set up home health for the patient.   Establishment or re-establishment of referral orders for community resources: No other necessary community resources.   Discussion with other health care providers: No discussion with other health care providers necessary.     Transition of Care Visit:     I have reviewed and updated the history and problem list.  I have reconciled the medication list.  I have discussed the hospitalization and current medical issues, prognosis and plans with the patient/family.  I  spent more than 50% of time discussing the care with the patient/family.  Total Face-to-Face Encounter: 60 minutes.    Medications Reconciliation:   I have reconciled the patient's home medications and discharge medications with the patient/family. I have updated all changes.  Refer to After-Visit  Medication List.    ASSESSMENT & PLAN:     HIGH RISK CONDITION(S):  Patient has a condition that poses threat to life and bodily function:   There are no diagnoses linked to this encounter.     Pulmonary hypertension  Continue home therapy of Adempas 2.5 mg TID Uptravi 1600 mcg BID, and Opsumit 10 mg daily     COPD (chronic obstructive pulmonary disease)  Long-standing smoking history--smoked approx 1 pack daily for over 50 years; quit 5 years ago  On 2 L home O2 continuous  Will reorder home Albuterol PRN, Symbicort BID and Spiriva    Were controlled substances prescribed?  No    Instructions for the patient:    Scheduled Follow-up :  Future Appointments   Date Time Provider Department Center   2/10/2022 10:00 AM LAB, APPOINTMENT [facility] LAB VNP [facility]   2/10/2022 10:30 AM [facility] INTERVENTIONAL RADIOLOGY [facility] RAD IR [facility]   3/8/2022  1:00 PM Ran Dumont MD [facility] PULMSVC [facility]       After Visit Medication List :     Medication List          Accurate as of January 27, 2022 11:59 PM. If you have any questions, ask your nurse or doctor.            CONTINUE taking these medications    allopurinoL 100 MG tablet  Commonly known as: ZYLOPRIM     ammonium lactate 12 % Crea     atorvastatin 80 MG tablet  Commonly known as: LIPITOR  Take 1 tablet (80 mg total) by mouth every evening. DO NOT TAKE UNTIL FOLLOWING UP WITH MD.     budesonide-formoterol 160-4.5 mcg 160-4.5 mcg/actuation Hfaa  Commonly known as: SYMBICORT     bumetanide 1 MG tablet  Commonly known as: BUMEX  Take 1 tablet (1 mg total) by mouth once daily.     CALCIUM ACETATE ORAL     colchicine 0.6 mg tablet  Commonly known as: COLCRYS     ferrous sulfate 325 mg (65 mg iron) Tab tablet  Commonly known as: FEOSOL     fluticasone propionate 50 mcg/actuation nasal spray  Commonly known as: FLONASE     folic acid 1 MG tablet  Commonly known as: FOLVITE     guaiFENesin 600 mg 12 hr tablet  Commonly known as: MUCINEX      HYDROcodone-acetaminophen 7.5-325 mg per tablet  Commonly known as: NORCO     irbesartan 150 MG tablet  Commonly known as: AVAPRO     magnesium oxide 400 mg (241.3 mg magnesium) tablet  Commonly known as: MAG-OX  Take 1 tablet (400 mg total) by mouth once daily.     metFORMIN 500 MG tablet  Commonly known as: GLUCOPHAGE     montelukast 10 mg tablet  Commonly known as: SINGULAIR     OCEAN NASAL 0.65 % nasal spray  Generic drug: sodium chloride     OPSUMIT 10 mg Tab  Generic drug: macitentan  Take 1 tablet (10 mg total) by mouth once daily.     oxymetazoline 0.05 % nasal spray  Commonly known as: AFRIN     pantoprazole 40 MG tablet  Commonly known as: PROTONIX     promethazine-codeine 6.25-10 mg/5 ml 6.25-10 mg/5 mL syrup  Commonly known as: PHENERGAN with CODEINE     riociguat 2.5 mg tablet  Commonly known as: ADEMPAS     selexipag 1,600 mcg Tab     SPIRIVA RESPIMAT 2.5 mcg/actuation inhaler  Generic drug: tiotropium bromide     VITAMIN D3 COMPLETE ORAL            Signature:  Valarie Nazario NP    Patient consent obtained prior to treatment

## 2022-02-01 NOTE — PATIENT INSTRUCTIONS
Appointment scheduled 2/10 with IR for Liver Biopsy  Appointment scheduled with Pulmonary on 3/8    Schedule appt with Dr. Conti - pain management    RTC 6 months, labs, walk

## 2022-02-01 NOTE — PROGRESS NOTES
..  Subjective:    Patient ID:  Bessie Davis is a 71 y.o. female who presents for follow-up of Pulmonary Hypertension.    HPI   Mrs. Davis is a 70 y/o AAF here for hospital follow-up. 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 She has tried and failed Tyvaso, switched to Uptravi 7/19, and is also on Opsumit 10 mg, Adempas 2.5 mg.    Admitted 1/16/2022 for hemoptysis, worsening dry cough, and diffuse back/flank pain. Hospital workup demonstrated worsening mediastinal adenopathy on CT Scan with liver opacities suspicious for malignancy. She was discharged with referral to IR for liver biopsy and f/u with pulmonay.    Today, Mrs. Davis does not feel well. She is having pain on her right flank/abdomen/back. She states that sometimes she is unable to lift her right leg into the bed. The pain comes and goes. Was supposed to have an appt with her back/pain management doctor but have not heard back from their office. She also endorses feeling more SOB. Is wearing oxygen ATC at 2L which is new. Notes that she does not have an appetitie and eats very little. Denies heartburn, reflux, constipation, diarrhea, an hemoptysis has resolved since hospitalization.    Mrs. Davis is also aware of her recent CT Scan changes and concerning liver involvement. Discussed adjusting her medications a little to remove some fluid, but will keep PH treatment the same and allow Mrs. Davis to focus on her other issues.    6MWT:  No walk today.    ECHO: 1/18/2022  · The left ventricle is normal in size with normal systolic function. The estimated ejection fraction is 55-60%.  · Mild right ventricular enlargement with low normal right ventricular systolic function.  · Grade I left ventricular diastolic dysfunction.  · Moderate biatrial enlargement.  · Small circumferential pericardial effusion, largest posteriorly, without evidence of hemodynamic  consequence.  · The estimated PA systolic pressure is 68 mmHg.  · Normal central venous pressure (3 mmHg).      RHC: 7/8/2019  · Estimated blood loss: none  · Severe Pulmonary hypertension (pt did not take any of her meds this am).  · Increased right and left-sided filling pressures.  · Normal Cardiac output and index by José method.  · RA: 22/ 15/ 17 RV: 90/ 10/ 20 PA: 90/ 30/ 50 PWP: 25/ 25/ 25 . Cardiac output was 6.94 by José. Cardiac index is 3.4 L/min/m2. O2 Sat: PA 69%. Pulmonary vascular resistance: 3.6 CARLIN.    CT SCAN: 1/18/2022  Impression:     1.  Interval increase in mediastinal lymphadenopathy.  2.  left upper lobe upper lobe, masslike area of consolidation with mediastinal invasion, narrowing of adjacent airways and branches of left pulmonary artery.  Consistent with primary lung malignancy.  3.  Stable right posterior hepatic lobe ill-defined mass, consistent with metastatic disease.  4.  Stable retroperitoneal and right posterior back wall nodules.    Review of Systems   Constitutional: Positive for decreased appetite and malaise/fatigue. Negative for chills and fever.   HENT: Negative.    Eyes: Negative.    Cardiovascular: Positive for dyspnea on exertion. Negative for chest pain, irregular heartbeat, near-syncope and syncope.   Respiratory: Positive for cough and shortness of breath. Negative for hemoptysis and wheezing.    Hematologic/Lymphatic: Negative.    Skin: Negative.    Musculoskeletal: Positive for back pain and myalgias. Negative for falls and joint pain.   Gastrointestinal: Positive for bloating and abdominal pain. Negative for constipation, diarrhea, dysphagia, heartburn, hematemesis, nausea and vomiting.   Genitourinary: Negative.    Neurological: Negative for dizziness and weakness.   Psychiatric/Behavioral: Negative.         Objective:    Physical Exam  Constitutional:       Appearance: She is ill-appearing.   HENT:      Head: Normocephalic and atraumatic.   Eyes:      Pupils: Pupils  are equal, round, and reactive to light.   Cardiovascular:      Rate and Rhythm: Normal rate and regular rhythm.      Pulses: Normal pulses.      Comments: Loud P2  Pulmonary:      Effort: Pulmonary effort is normal.      Breath sounds: Normal breath sounds.   Abdominal:      General: There is distension.      Palpations: Abdomen is rigid.      Tenderness: There is abdominal tenderness in the right lower quadrant.   Musculoskeletal:      Cervical back: Normal range of motion.      Right lower leg: Edema present.      Left lower leg: Edema present.      Comments: +2 pitting   Skin:     General: Skin is warm and dry.      Capillary Refill: Capillary refill takes less than 2 seconds.   Neurological:      Mental Status: She is alert and oriented to person, place, and time.   Psychiatric:         Mood and Affect: Mood normal.         Behavior: Behavior normal.           Lab Results   Component Value Date    BNP 37 02/01/2022     02/01/2022     02/01/2022    K 4.3 02/01/2022    K 4.3 02/01/2022    MG 1.7 02/01/2022     02/01/2022     02/01/2022    CO2 27 02/01/2022    CO2 27 02/01/2022    BUN 27 (H) 02/01/2022    BUN 27 (H) 02/01/2022    CREATININE 1.3 02/01/2022    CREATININE 1.3 02/01/2022     (H) 02/01/2022     (H) 02/01/2022    HGBA1C 6.1 05/02/2015    AST 18 02/01/2022    AST 18 02/01/2022    ALT 9 (L) 02/01/2022    ALT 9 (L) 02/01/2022    ALBUMIN 2.6 (L) 02/01/2022    ALBUMIN 2.6 (L) 02/01/2022    PROT 6.7 02/01/2022    PROT 6.7 02/01/2022    BILITOT 0.3 02/01/2022    BILITOT 0.3 02/01/2022       Magnesium   Date Value Ref Range Status   02/01/2022 1.7 1.6 - 2.6 mg/dL Final       Lab Results   Component Value Date    WBC 8.09 02/01/2022    HGB 8.9 (L) 02/01/2022    HCT 29.1 (L) 02/01/2022    MCV 85 02/01/2022     02/01/2022       Lab Results   Component Value Date    INR 1.0 01/16/2022    INR 1.0 12/04/2019    INR 0.9 12/03/2019       BNP   Date Value Ref Range Status    02/01/2022 37 0 - 99 pg/mL Final     Comment:     Values of less than 100 pg/ml are consistent with non-CHF populations.   01/16/2022 16 0 - 99 pg/mL Final     Comment:     Values of less than 100 pg/ml are consistent with non-CHF populations.   01/08/2022 37 0 - 99 pg/mL Final     Comment:     Values of less than 100 pg/ml are consistent with non-CHF populations.       LD   Date Value Ref Range Status   05/04/2015 380 (H) 110 - 260 U/L Final     Comment:     Results are increased in hemolyzed samples.   05/01/2015 301 (H) 110 - 260 U/L Final     Comment:     Results are increased in hemolyzed samples.     ..  WHO Group:  1 Functional Class:  III REVEAL Score:    Assessment:       1. Pulmonary hypertension    2. Right lower quadrant abdominal pain    3. Acute right-sided low back pain, unspecified whether sciatica present         Plan:       Increase Bumex to 1mg, BID for 3 days.  Have BMP drawn on Monday.  Appointment scheduled 2/10 with IR for Liver Biopsy  Appointment scheduled with Pulmonary on 3/8    Schedule appt with Dr. Conti - pain management    RTC 6 months, labs, walk

## 2022-02-09 NOTE — PATIENT INSTRUCTIONS
Instructions:  - [facility] Nurse Practitioner to schedule home follow-up visit with patient  as needed.  - Continue all medications, treatments and therapies as ordered.   - Follow all instructions, recommendations as discussed.  - Maintain Safety Precautions at all times.  - Attend all medical appointments as scheduled.  - For worsening symptoms: call Primary Care Physician or Nurse Practitioner.  - For emergencies, call 911 or immediately report to the nearest emergency room.  - Limit Risks of environmental exposure to coronavirus/COVID-19 as discussed including: social distancing, hand hygiene, and limiting departures from the home for necessities only.

## 2022-02-09 NOTE — TELEPHONE ENCOUNTER
Per patient request, nurse navigator called patient's nieceViridiana. Informed niece that labs needed to be drawn-she requested tomorrow before patient's biopsy. Niece was also informed of Palliative referral.

## 2022-02-09 NOTE — TELEPHONE ENCOUNTER
----- Message from Chula Marinelli MD sent at 2/5/2022  1:10 PM CST -----  Mrs. Bessie Paiz's Bumex was increased in clinic last week by Dr. Garcia. Just reviewed her labs. Both BUN and Creat are up. Recomemmend to check how shes feeling and reduce Bumex to original dose.

## 2022-02-09 NOTE — TELEPHONE ENCOUNTER
Spoke with patient who states that she is not doing well and is in a lot of pain. Patient has an appointment coming up with Dr. Conti for pain management. Patient is back on her original dose of Bumex (only took BID for 3 days). Although patient does not think Bumex helped her, she is back down to 170 lbs and still SOB.     Spoke with LUIS E Lancaster who wants additional labs (CMP and BNP) and refer patient to palliative care for symptom management.

## 2022-02-10 NOTE — PROGRESS NOTES
"Subjective:       Patient ID: Bessie Davis is a 71 y.o. female.    Chief Complaint: Lesion    Patient referred to Interventional Radiology by Lauryn Perrin NP for evaluation of a liver mass. Mass was identified during a hospital admission for hemoptysis, worsening dry cough, and diffuse back/flank pain. She has a history of PAH with mod to severe RV dysfunction. A CT scan obtained on 1/17/2022 noted "Ill-defined hypoattenuating masses in the liver, as described above, increased in size from CT 11/30/2019 and concerning for metastatic disease." She has complaints of fatigue, decreased appetite, and right sided pain. She is accompanied by her niece.     Review of Systems   Constitutional: Positive for appetite change (decreased; lost 5# in 1 week) and fatigue. Negative for activity change, chills and fever.   HENT: Negative for nasal congestion, drooling, ear discharge, postnasal drip, sneezing and trouble swallowing.    Eyes: Negative for pain, discharge, redness and itching.   Respiratory: Positive for cough and shortness of breath. Negative for wheezing and stridor.    Cardiovascular: Negative for chest pain, palpitations and leg swelling.   Gastrointestinal: Positive for abdominal distention (comes and goes depending on diet), abdominal pain (right side) and constipation. Negative for diarrhea, nausea and vomiting.   Genitourinary: Negative for difficulty urinating, dysuria, frequency and urgency.   Musculoskeletal: Negative for arthralgias, back pain, gait problem, joint swelling, myalgias and neck pain.        Uses walker at home   Integumentary:  Negative for color change, pallor and rash.   Neurological: Negative for dizziness and headaches.         Objective:      Physical Exam  Vitals reviewed.   Constitutional:       General: She is not in acute distress.     Appearance: She is well-developed and well-nourished. She is not diaphoretic.   HENT:      Head: Normocephalic and atraumatic.      Right " Ear: External ear normal.      Left Ear: External ear normal.      Nose: Nose normal.      Mouth/Throat:      Mouth: Oropharynx is clear and moist.      Pharynx: No oropharyngeal exudate.   Eyes:      General: No scleral icterus.        Right eye: No discharge.         Left eye: No discharge.      Conjunctiva/sclera: Conjunctivae normal.      Pupils: Pupils are equal, round, and reactive to light.   Neck:      Thyroid: No thyromegaly.      Vascular: No JVD.      Trachea: No tracheal deviation.   Cardiovascular:      Rate and Rhythm: Normal rate and regular rhythm.      Pulses: Intact distal pulses.      Heart sounds: Normal heart sounds. No murmur heard.  No friction rub. No gallop.    Pulmonary:      Effort: Pulmonary effort is normal. No respiratory distress.      Breath sounds: Normal breath sounds. No stridor. No wheezing or rales.      Comments: 2L  Abdominal:      General: Bowel sounds are normal. There is no distension.      Palpations: Abdomen is soft. There is no hepatosplenomegaly or mass.      Tenderness: There is no abdominal tenderness. There is no guarding or rebound.   Musculoskeletal:         General: No edema.      Cervical back: Neck supple.   Lymphadenopathy:      Cervical: No cervical adenopathy.   Skin:     General: Skin is warm and dry.      Nails: There is no clubbing or cyanosis.   Neurological:      Mental Status: She is alert and oriented to person, place, and time.      Gait: Gait normal.   Psychiatric:         Mood and Affect: Mood and affect normal.       CT scan 1/17/2022      Assessment:       Problem List Items Addressed This Visit    None     Visit Diagnoses     Liver mass    -  Primary    Relevant Orders    IR Biopsy Liver    CBC Auto Differential    Protime-INR    Pre-procedure lab exam        Relevant Orders    COVID-19 Routine Screening    CBC Auto Differential    Protime-INR          Plan:         Discussed case with Dr. Arias.  Explained to patient can offer biopsy of liver  navin. Discussed how the procedure will be performed, risks (including, but not limited to, pain, bleeding, infection, damage to nearby structures, and the need for additional procedures), benefits, possible complications, pre-post procedure expectations, and alternatives. The patient voices understanding and all questions have been answered.  The patient agrees to proceed as planned. Patient scheduled for 3/9/2022. Pre-procedure handout with clinic phone number provided. Patient will have pre-procedure labs drawn today. COVID test scheduled for 3/7/2022.

## 2022-02-10 NOTE — LETTER
February 10, 2022    Goshencelia Davis  [address] [place] [address]  [place] [address]     [facility]  [address]  [place] [address]  Phone: [phone] PRE-PROCEDURE INSTRUCTIONS    Your procedure with Interventional Radiology is scheduled for 3/9/2022. Please arrive by 10:30am. Please note your appointment time in [facility] will be different.    You must check-in and receive a wristband before going to your procedure. We will call you the day before to let you know your check-in location.    **Do not eat or drink anything between midnight and the time of your procedure. This includes gum, mints, and candy lemon drops.    **Do not smoke or drink alcoholic beverages 24 hours prior to your procedure.    **If you wear contact lenses, dentures, hearing aids, or glasses, bring a container to put them in during the procedure and give them to a family member for safekeeping.    **If you have been diagnosed with sleep apnea please bring your CPAP machine.    **If your doctor has scheduled you for an overnight stay, bring a small overnight bag with any personal items that you may need.    **Make arrangements in advance for transportation home by a responsible adult. It is not safe to drive a vehicle during the 24 hours following the procedure.    **All [facility] facilities and properties are tobacco free. Smoking is NOT allowed.    PLEASE NOTE: The procedure schedule has many variables which affect the time of your procedure. Family members should be available if your surgery time changes.    If you have any questions about these instructions call Interventional Radiology at [phone] Monday - Friday between 8:00am and 4:00pm or [phone] (ask for interventional radiology resident) for after hours.

## 2022-02-10 NOTE — Clinical Note
"Thank you for referring Ms. Davis to Interventional Radiology at the [facility]. Please don't hesitate to contact us if there are any questions regarding this evaluation at [phone]. If you have any other patients for whom you would like a consultation, please place an order for "[id number]", and we will be happy to review their case.  Sincerely, KAYE Montague, FNP Interventional Radiology    "

## 2022-02-17 NOTE — TELEPHONE ENCOUNTER
Approved today  [place] Case: [id number], Status: Approved, Coverage Starts on: 1/1/2022 12:00:00 AM, Coverage Ends on: 12/31/2022 12:00:00 AM. Questions? Contact [phone]. Uptravi 1600 mcg

## 2022-02-22 NOTE — TELEPHONE ENCOUNTER
Mrs. Perrin called with concerns about Mrs. Davis. She is her HH nurse and had found Mrs. Davis SOB at a visit. Per discussions with patient and daughter, the patient has been having trouble eating, drinking, not breathing well, and has had a serious decline. Mrs. Perrin asked about hospice as an option. Mrs. Davis was in the hospital in January with lung mass concerning for malignancy and liver mass concerning for mets. Patient not scheduled to see pulmonary or liver biopsy until March, following hospital discharge.  Confirmed that we would support hospice, but if the patient wanted to come to the ER that would be fine as well. Mrs. Perrin talked to the family who chose hospice at this time and she is able to initiate hospice for Mrs. Davis. Remain available for questions or concerns.  CORINE Perrin NP consulted with Dr. Garcia on this case.

## 2022-02-25 NOTE — TELEPHONE ENCOUNTER
[facility] has been trying to reach patient for Uptravi but patient is unreachable.     LVM for patient to call [facility]

## 2022-03-04 ENCOUNTER — TELEPHONE (OUTPATIENT)
Dept: PULMONOLOGY | Facility: CLINIC | Age: 72
End: 2022-03-04
Payer: MEDICARE

## 2022-03-04 DIAGNOSIS — I27.20 PULMONARY HYPERTENSION: Primary | ICD-10-CM

## 2022-03-04 NOTE — TELEPHONE ENCOUNTER
Called to confirm patient  appointment on 3/8/2022 at 1:00 pm with .  Patient daughter confirmed  patient was  patient passed away a few days ago.  I did give my deepest sympathy to  daughter and family.

## 2022-03-08 ENCOUNTER — TELEPHONE (OUTPATIENT)
Dept: INTERVENTIONAL RADIOLOGY/VASCULAR | Facility: CLINIC | Age: 72
End: 2022-03-08
Payer: MEDICARE

## 2022-03-08 ENCOUNTER — DOCUMENTATION ONLY (OUTPATIENT)
Dept: PREADMISSION TESTING | Facility: HOSPITAL | Age: 72
End: 2022-03-08
Payer: MEDICARE

## 2022-03-08 NOTE — TELEPHONE ENCOUNTER
Left message for pt to return my call. Need to reschedule labs and covid for IR procedure scheduled on 03/9 . Please forward call to [phone]. Thanks

## 2022-03-09 ENCOUNTER — TELEPHONE (OUTPATIENT)
Dept: INTERVENTIONAL RADIOLOGY/VASCULAR | Facility: CLINIC | Age: 72
End: 2022-03-09
Payer: MEDICARE

## 2022-03-09 NOTE — TELEPHONE ENCOUNTER
Left message for pt to return my call. Need to reschedule IR procedure. Please forward call to [phone]. Thanks

## 2022-03-09 NOTE — LETTER
March 9, 2022    Bessie Davis  [address] [place] [address] [place] [address]             [facility]  [address]  [facility] [address]  Phone: [phone] Dear Ms GarciaDavis:                  During your last visit to our office, we recommended a procedure with a specialist. Our review of your chart indicates that you have not obtained the procedure.           This procedure is a vital part of your care.           Please remember the importance of your health and take time to consult a specialist. If you do not wish to follow through with the consultation, please contact us so we can discuss this matter with you.                  Sincerely,                  @Alecia Hunter MA

## 2022-04-22 ENCOUNTER — TELEPHONE (OUTPATIENT)
Dept: PULMONOLOGY | Facility: CLINIC | Age: 72
End: 2022-04-22
Payer: MEDICARE
